# Patient Record
Sex: MALE | Race: WHITE | Employment: FULL TIME | URBAN - METROPOLITAN AREA
[De-identification: names, ages, dates, MRNs, and addresses within clinical notes are randomized per-mention and may not be internally consistent; named-entity substitution may affect disease eponyms.]

---

## 2017-11-18 ENCOUNTER — HOSPITAL ENCOUNTER (OUTPATIENT)
Age: 62
Setting detail: OBSERVATION
Discharge: HOME | End: 2017-11-19
Attending: EMERGENCY MEDICINE | Admitting: HOSPITALIST
Payer: COMMERCIAL

## 2017-11-18 ENCOUNTER — APPOINTMENT (OUTPATIENT)
Dept: CT IMAGING | Age: 62
End: 2017-11-18
Attending: EMERGENCY MEDICINE
Payer: COMMERCIAL

## 2017-11-18 ENCOUNTER — APPOINTMENT (OUTPATIENT)
Dept: ULTRASOUND IMAGING | Age: 62
End: 2017-11-18
Attending: HOSPITALIST
Payer: COMMERCIAL

## 2017-11-18 DIAGNOSIS — I63.9 CEREBROVASCULAR ACCIDENT (CVA), UNSPECIFIED MECHANISM (HCC): Primary | ICD-10-CM

## 2017-11-18 PROBLEM — E03.9 ACQUIRED HYPOTHYROIDISM: Chronic | Status: ACTIVE | Noted: 2017-11-18

## 2017-11-18 PROBLEM — I10 HYPERTENSION: Status: ACTIVE | Noted: 2017-11-18

## 2017-11-18 PROBLEM — F98.8 ADD (ATTENTION DEFICIT DISORDER): Chronic | Status: ACTIVE | Noted: 2017-11-18

## 2017-11-18 LAB
ALBUMIN SERPL-MCNC: 4.1 G/DL (ref 3.2–4.6)
ALBUMIN/GLOB SERPL: 1 {RATIO} (ref 1.2–3.5)
ALP SERPL-CCNC: 85 U/L (ref 50–136)
ALT SERPL-CCNC: 39 U/L (ref 12–65)
ANION GAP SERPL CALC-SCNC: 10 MMOL/L (ref 7–16)
AST SERPL-CCNC: 32 U/L (ref 15–37)
ATRIAL RATE: 96 BPM
BASOPHILS # BLD: 0.1 K/UL (ref 0–0.2)
BASOPHILS NFR BLD: 1 % (ref 0–2)
BILIRUB SERPL-MCNC: 0.3 MG/DL (ref 0.2–1.1)
BUN SERPL-MCNC: 14 MG/DL (ref 8–23)
CALCIUM SERPL-MCNC: 9.3 MG/DL (ref 8.3–10.4)
CALCULATED P AXIS, ECG09: 47 DEGREES
CALCULATED R AXIS, ECG10: -2 DEGREES
CALCULATED T AXIS, ECG11: 34 DEGREES
CHLORIDE SERPL-SCNC: 99 MMOL/L (ref 98–107)
CO2 SERPL-SCNC: 29 MMOL/L (ref 21–32)
CREAT BLD-MCNC: 1.1 MG/DL (ref 0.8–1.5)
CREAT SERPL-MCNC: 1.3 MG/DL (ref 0.8–1.5)
DIAGNOSIS, 93000: NORMAL
DIFFERENTIAL METHOD BLD: ABNORMAL
EOSINOPHIL # BLD: 0.1 K/UL (ref 0–0.8)
EOSINOPHIL NFR BLD: 3 % (ref 0.5–7.8)
ERYTHROCYTE [DISTWIDTH] IN BLOOD BY AUTOMATED COUNT: 13.3 % (ref 11.9–14.6)
GLOBULIN SER CALC-MCNC: 4.2 G/DL (ref 2.3–3.5)
GLUCOSE BLD STRIP.AUTO-MCNC: 183 MG/DL (ref 65–100)
GLUCOSE BLD STRIP.AUTO-MCNC: 196 MG/DL (ref 65–100)
GLUCOSE SERPL-MCNC: 176 MG/DL (ref 65–100)
HCT VFR BLD AUTO: 37 % (ref 41.1–50.3)
HGB BLD-MCNC: 12.3 G/DL (ref 13.6–17.2)
IMM GRANULOCYTES # BLD: 0 K/UL (ref 0–0.5)
IMM GRANULOCYTES NFR BLD AUTO: 0 % (ref 0–5)
INR BLD: 0.9 (ref 0.9–1.2)
LYMPHOCYTES # BLD: 2 K/UL (ref 0.5–4.6)
LYMPHOCYTES NFR BLD: 36 % (ref 13–44)
MCH RBC QN AUTO: 31.4 PG (ref 26.1–32.9)
MCHC RBC AUTO-ENTMCNC: 33.2 G/DL (ref 31.4–35)
MCV RBC AUTO: 94.4 FL (ref 79.6–97.8)
MONOCYTES # BLD: 0.7 K/UL (ref 0.1–1.3)
MONOCYTES NFR BLD: 12 % (ref 4–12)
NEUTS SEG # BLD: 2.7 K/UL (ref 1.7–8.2)
NEUTS SEG NFR BLD: 48 % (ref 43–78)
P-R INTERVAL, ECG05: 176 MS
PLATELET # BLD AUTO: 361 K/UL (ref 150–450)
PMV BLD AUTO: 10.6 FL (ref 10.8–14.1)
POTASSIUM SERPL-SCNC: 4.2 MMOL/L (ref 3.5–5.1)
PROT SERPL-MCNC: 8.3 G/DL (ref 6.3–8.2)
PT BLD: 11.2 SECS (ref 9.6–11.6)
Q-T INTERVAL, ECG07: 348 MS
QRS DURATION, ECG06: 88 MS
QTC CALCULATION (BEZET), ECG08: 439 MS
RBC # BLD AUTO: 3.92 M/UL (ref 4.23–5.67)
SODIUM SERPL-SCNC: 138 MMOL/L (ref 136–145)
TROPONIN I BLD-MCNC: 0 NG/ML (ref 0.02–0.05)
VENTRICULAR RATE, ECG03: 96 BPM
WBC # BLD AUTO: 5.5 K/UL (ref 4.3–11.1)

## 2017-11-18 PROCEDURE — 80053 COMPREHEN METABOLIC PANEL: CPT | Performed by: EMERGENCY MEDICINE

## 2017-11-18 PROCEDURE — 74011250637 HC RX REV CODE- 250/637: Performed by: HOSPITALIST

## 2017-11-18 PROCEDURE — 96372 THER/PROPH/DIAG INJ SC/IM: CPT

## 2017-11-18 PROCEDURE — 99285 EMERGENCY DEPT VISIT HI MDM: CPT | Performed by: EMERGENCY MEDICINE

## 2017-11-18 PROCEDURE — 93880 EXTRACRANIAL BILAT STUDY: CPT

## 2017-11-18 PROCEDURE — 82565 ASSAY OF CREATININE: CPT

## 2017-11-18 PROCEDURE — 82962 GLUCOSE BLOOD TEST: CPT

## 2017-11-18 PROCEDURE — 85610 PROTHROMBIN TIME: CPT

## 2017-11-18 PROCEDURE — 93005 ELECTROCARDIOGRAM TRACING: CPT | Performed by: EMERGENCY MEDICINE

## 2017-11-18 PROCEDURE — 70450 CT HEAD/BRAIN W/O DYE: CPT

## 2017-11-18 PROCEDURE — 74011250636 HC RX REV CODE- 250/636: Performed by: HOSPITALIST

## 2017-11-18 PROCEDURE — 74011000250 HC RX REV CODE- 250: Performed by: EMERGENCY MEDICINE

## 2017-11-18 PROCEDURE — 65270000029 HC RM PRIVATE

## 2017-11-18 PROCEDURE — 70496 CT ANGIOGRAPHY HEAD: CPT

## 2017-11-18 PROCEDURE — 96374 THER/PROPH/DIAG INJ IV PUSH: CPT | Performed by: EMERGENCY MEDICINE

## 2017-11-18 PROCEDURE — 74011000258 HC RX REV CODE- 258: Performed by: EMERGENCY MEDICINE

## 2017-11-18 PROCEDURE — 74011636320 HC RX REV CODE- 636/320: Performed by: EMERGENCY MEDICINE

## 2017-11-18 PROCEDURE — 84484 ASSAY OF TROPONIN QUANT: CPT

## 2017-11-18 PROCEDURE — 85025 COMPLETE CBC W/AUTO DIFF WBC: CPT | Performed by: EMERGENCY MEDICINE

## 2017-11-18 PROCEDURE — 74011250637 HC RX REV CODE- 250/637: Performed by: EMERGENCY MEDICINE

## 2017-11-18 PROCEDURE — 99218 HC RM OBSERVATION: CPT

## 2017-11-18 RX ORDER — ASPIRIN 325 MG
325 TABLET ORAL DAILY
Status: DISCONTINUED | OUTPATIENT
Start: 2017-11-19 | End: 2017-11-19 | Stop reason: HOSPADM

## 2017-11-18 RX ORDER — LANOLIN ALCOHOL/MO/W.PET/CERES
100 CREAM (GRAM) TOPICAL DAILY
Status: DISCONTINUED | OUTPATIENT
Start: 2017-11-19 | End: 2017-11-19 | Stop reason: HOSPADM

## 2017-11-18 RX ORDER — LABETALOL HYDROCHLORIDE 5 MG/ML
5 INJECTION, SOLUTION INTRAVENOUS
Status: DISCONTINUED | OUTPATIENT
Start: 2017-11-18 | End: 2017-11-19 | Stop reason: HOSPADM

## 2017-11-18 RX ORDER — SODIUM CHLORIDE 0.9 % (FLUSH) 0.9 %
5-10 SYRINGE (ML) INJECTION AS NEEDED
Status: DISCONTINUED | OUTPATIENT
Start: 2017-11-18 | End: 2017-11-18

## 2017-11-18 RX ORDER — LORAZEPAM 2 MG/ML
2 INJECTION INTRAMUSCULAR
Status: DISCONTINUED | OUTPATIENT
Start: 2017-11-18 | End: 2017-11-19 | Stop reason: HOSPADM

## 2017-11-18 RX ORDER — LEVOTHYROXINE SODIUM 50 UG/1
75 TABLET ORAL
Status: DISCONTINUED | OUTPATIENT
Start: 2017-11-19 | End: 2017-11-19 | Stop reason: HOSPADM

## 2017-11-18 RX ORDER — DEXTROAMPHETAMINE SACCHARATE, AMPHETAMINE ASPARTATE, DEXTROAMPHETAMINE SULFATE AND AMPHETAMINE SULFATE 2.5; 2.5; 2.5; 2.5 MG/1; MG/1; MG/1; MG/1
20 TABLET ORAL DAILY
Status: DISCONTINUED | OUTPATIENT
Start: 2017-11-19 | End: 2017-11-19 | Stop reason: HOSPADM

## 2017-11-18 RX ORDER — LEVOTHYROXINE SODIUM 75 UG/1
75 TABLET ORAL
COMMUNITY

## 2017-11-18 RX ORDER — ESCITALOPRAM OXALATE 10 MG/1
20 TABLET ORAL DAILY
COMMUNITY

## 2017-11-18 RX ORDER — SODIUM CHLORIDE 0.9 % (FLUSH) 0.9 %
5-10 SYRINGE (ML) INJECTION EVERY 8 HOURS
Status: DISCONTINUED | OUTPATIENT
Start: 2017-11-18 | End: 2017-11-18

## 2017-11-18 RX ORDER — LORAZEPAM 1 MG/1
1 TABLET ORAL
Status: DISCONTINUED | OUTPATIENT
Start: 2017-11-18 | End: 2017-11-19 | Stop reason: HOSPADM

## 2017-11-18 RX ORDER — PRAVASTATIN SODIUM 20 MG/1
80 TABLET ORAL
Status: DISCONTINUED | OUTPATIENT
Start: 2017-11-18 | End: 2017-11-19 | Stop reason: HOSPADM

## 2017-11-18 RX ORDER — GUAIFENESIN 100 MG/5ML
324 LIQUID (ML) ORAL
Status: COMPLETED | OUTPATIENT
Start: 2017-11-18 | End: 2017-11-18

## 2017-11-18 RX ORDER — ESCITALOPRAM OXALATE 10 MG/1
10 TABLET ORAL DAILY
Status: DISCONTINUED | OUTPATIENT
Start: 2017-11-19 | End: 2017-11-19 | Stop reason: HOSPADM

## 2017-11-18 RX ORDER — SODIUM CHLORIDE 0.9 % (FLUSH) 0.9 %
10 SYRINGE (ML) INJECTION
Status: DISCONTINUED | OUTPATIENT
Start: 2017-11-18 | End: 2017-11-18

## 2017-11-18 RX ORDER — DEXTROAMPHETAMINE SACCHARATE, AMPHETAMINE ASPARTATE, DEXTROAMPHETAMINE SULFATE AND AMPHETAMINE SULFATE 5; 5; 5; 5 MG/1; MG/1; MG/1; MG/1
20 TABLET ORAL DAILY
COMMUNITY
End: 2018-09-13

## 2017-11-18 RX ORDER — LABETALOL HYDROCHLORIDE 5 MG/ML
10 INJECTION, SOLUTION INTRAVENOUS
Status: COMPLETED | OUTPATIENT
Start: 2017-11-18 | End: 2017-11-18

## 2017-11-18 RX ORDER — FOLIC ACID 1 MG/1
1 TABLET ORAL DAILY
Status: DISCONTINUED | OUTPATIENT
Start: 2017-11-19 | End: 2017-11-19 | Stop reason: HOSPADM

## 2017-11-18 RX ORDER — ENOXAPARIN SODIUM 100 MG/ML
40 INJECTION SUBCUTANEOUS EVERY 24 HOURS
Status: DISCONTINUED | OUTPATIENT
Start: 2017-11-18 | End: 2017-11-19 | Stop reason: HOSPADM

## 2017-11-18 RX ORDER — SODIUM CHLORIDE 0.9 % (FLUSH) 0.9 %
10 SYRINGE (ML) INJECTION
Status: COMPLETED | OUTPATIENT
Start: 2017-11-18 | End: 2017-11-18

## 2017-11-18 RX ORDER — SODIUM CHLORIDE 0.9 % (FLUSH) 0.9 %
5-10 SYRINGE (ML) INJECTION EVERY 8 HOURS
Status: DISCONTINUED | OUTPATIENT
Start: 2017-11-18 | End: 2017-11-19 | Stop reason: HOSPADM

## 2017-11-18 RX ADMIN — Medication 10 ML: at 17:00

## 2017-11-18 RX ADMIN — Medication 10 ML: at 21:05

## 2017-11-18 RX ADMIN — Medication 10 ML: at 10:57

## 2017-11-18 RX ADMIN — Medication 10 ML: at 22:00

## 2017-11-18 RX ADMIN — LABETALOL HYDROCHLORIDE 10 MG: 5 INJECTION, SOLUTION INTRAVENOUS at 10:50

## 2017-11-18 RX ADMIN — ENOXAPARIN SODIUM 40 MG: 40 INJECTION SUBCUTANEOUS at 18:16

## 2017-11-18 RX ADMIN — LORAZEPAM 1 MG: 1 TABLET ORAL at 20:57

## 2017-11-18 RX ADMIN — PRAVASTATIN SODIUM 80 MG: 20 TABLET ORAL at 21:04

## 2017-11-18 RX ADMIN — Medication 10 ML: at 18:36

## 2017-11-18 RX ADMIN — ASPIRIN 81 MG 324 MG: 81 TABLET ORAL at 11:39

## 2017-11-18 RX ADMIN — Medication 10 ML: at 21:04

## 2017-11-18 RX ADMIN — IOPAMIDOL 80 ML: 755 INJECTION, SOLUTION INTRAVENOUS at 10:57

## 2017-11-18 RX ADMIN — SODIUM CHLORIDE 100 ML: 900 INJECTION, SOLUTION INTRAVENOUS at 10:58

## 2017-11-18 NOTE — ED PROVIDER NOTES
HPI Comments: 49-year-old male presenting with symptoms concerning for stroke. Mentions that symptoms began about 10 minutes or to arrival which would make his time of onset approximately 10:20 AM.  He mentions he was at breakfast when he experienced sudden onset weakness and numbness of his left upper and lower extremity. He also was noted to have a left-sided facial droop. No history of previous CVA. Denies any visual complaints. Denies headache. Patient does not appear to be on any blood thinning medications. History includes hypertension. Patient is a 57 y.o. male presenting with weakness. The history is provided by the patient. No  was used. Extremity Weakness    Pertinent negatives include no back pain. Past Medical History:   Diagnosis Date    Chronic kidney disease     kidney stone    Hypertension        History reviewed. No pertinent surgical history. History reviewed. No pertinent family history. Social History     Social History    Marital status:      Spouse name: N/A    Number of children: N/A    Years of education: N/A     Occupational History    Not on file. Social History Main Topics    Smoking status: Never Smoker    Smokeless tobacco: Not on file    Alcohol use Yes      Comment: occasional    Drug use: No    Sexual activity: Not on file     Other Topics Concern    Not on file     Social History Narrative         ALLERGIES: Review of patient's allergies indicates no known allergies. Review of Systems   Constitutional: Negative for fatigue and fever. HENT: Negative for congestion. Respiratory: Negative for cough and shortness of breath. Gastrointestinal: Negative for abdominal pain, nausea and vomiting. Genitourinary: Negative for flank pain. Musculoskeletal: Negative for back pain. Skin: Negative for rash. Neurological: Positive for weakness. Negative for syncope, light-headedness and headaches.    All other systems reviewed and are negative. Vitals:    11/18/17 1035   BP: (!) 184/110   Pulse: (!) 106   Resp: 18   SpO2: 100%   Weight: 108 kg (238 lb)   Height: 5' 9\" (1.753 m)            Physical Exam   Constitutional: He is oriented to person, place, and time. He appears well-developed and well-nourished. No distress. HENT:   Head: Normocephalic and atraumatic. Eyes: Conjunctivae and EOM are normal. Pupils are equal, round, and reactive to light. Neck: Normal range of motion. Neck supple. Cardiovascular: Normal rate, regular rhythm and normal heart sounds. Symmetric pulses in bilateral upper and lower extremities. Pulmonary/Chest: Effort normal and breath sounds normal. No respiratory distress. He has no wheezes. He has no rales. Abdominal: Soft. He exhibits no distension. There is no tenderness. There is no rebound. Musculoskeletal: Normal range of motion. He exhibits no edema or tenderness. Neurological: He is alert and oriented to person, place, and time. Patient has a left-sided facial droop. He is able to furrow his brow bilaterally. Patient has weakness of his left upper and left lower extremity. He reports numbness. While pinching his left lower extremity he had no sensation. He is speaking in a clear voice without slurring and denies headache. Skin: Skin is warm and dry. No rash noted. He is not diaphoretic. Psychiatric: He has a normal mood and affect. His behavior is normal.   Vitals reviewed. MDM  Number of Diagnoses or Management Options  Cerebrovascular accident (CVA), unspecified mechanism [facility]): new and requires workup  Diagnosis management comments: 10:43 AM  Patient's signs and symptoms concerning for CVA. We will obtain basic labs and immediately sent to CT scan. Code stroke called. Patient somewhat hypertensive at 184 over 110. Ordered a small amount of labetalol. Pending urgent neuro consult.        Amount and/or Complexity of Data Reviewed  Clinical lab tests: ordered and reviewed (Results for orders placed or performed during the hospital encounter of 11/18/17  -CBC WITH AUTOMATED DIFF       Result                                            Value                         Ref Range                       WBC                                               5.5                           4.3 - 11.1 K/uL                 RBC                                               3.92 (L)                      4.23 - 5.67 M/uL                HGB                                               12.3 (L)                      13.6 - 17.2 g/dL                HCT                                               37.0 (L)                      41.1 - 50.3 %                   MCV                                               94.4                          79.6 - 97.8 FL                  MCH                                               31.4                          26.1 - 32.9 PG                  MCHC                                              33.2                          31.4 - 35.0 g/dL                RDW                                               13.3                          11.9 - 14.6 %                   PLATELET                                          361                           150 - 450 K/uL                  MPV                                               10.6 (L)                      10.8 - 14.1 FL                  DF                                                AUTOMATED                                                     NEUTROPHILS                                       48                            43 - 78 %                       LYMPHOCYTES                                       36                            13 - 44 %                       MONOCYTES                                         12                            4.0 - 12.0 %                    EOSINOPHILS                                       3                             0.5 - 7.8 %                     BASOPHILS 1                             0.0 - 2.0 %                     IMMATURE GRANULOCYTES                             0                             0.0 - 5.0 %                     ABS. NEUTROPHILS                                  2.7                           1.7 - 8.2 K/UL                  ABS. LYMPHOCYTES                                  2.0                           0.5 - 4.6 K/UL                  ABS. MONOCYTES                                    0.7                           0.1 - 1.3 K/UL                  ABS. EOSINOPHILS                                  0.1                           0.0 - 0.8 K/UL                  ABS. BASOPHILS                                    0.1                           0.0 - 0.2 K/UL                  ABS. IMM.  GRANS.                                  0.0                           0.0 - 0.5 K/UL             -METABOLIC PANEL, COMPREHENSIVE       Result                                            Value                         Ref Range                       Sodium                                            138                           136 - 145 mmol/L                Potassium                                         4.2                           3.5 - 5.1 mmol/L                Chloride                                          99                            98 - 107 mmol/L                 CO2                                               29                            21 - 32 mmol/L                  Anion gap                                         10                            7 - 16 mmol/L                   Glucose                                           176 (H)                       65 - 100 mg/dL                  BUN                                               14                            8 - 23 MG/DL                    Creatinine                                        1.30                          0.8 - 1.5 MG/DL                 GFR est AA >60                           >60 ml/min/1.73m2               GFR est non-AA                                    59 (L)                        >60 ml/min/1.73m2               Calcium                                           9.3                           8.3 - 10.4 MG/DL                Bilirubin, total                                  0.3                           0.2 - 1.1 MG/DL                 ALT (SGPT)                                        39                            12 - 65 U/L                     AST (SGOT)                                        32                            15 - 37 U/L                     Alk. phosphatase                                  85                            50 - 136 U/L                    Protein, total                                    8.3 (H)                       6.3 - 8.2 g/dL                  Albumin                                           4.1                           3.2 - 4.6 g/dL                  Globulin                                          4.2 (H)                       2.3 - 3.5 g/dL                  A-G Ratio                                         1.0 (L)                       1.2 - 3.5                  -POC PT/INR       Result                                            Value                         Ref Range                       Prothrombin time (POC)                            11.2                          9.6 - 11.6 SECS                 INR (POC)                                         0.9                           0.9 - 1.2                  -GLUCOSE, POC       Result                                            Value                         Ref Range                       Glucose (POC)                                     196 (H)                       65 - 100 mg/dL             -POC TROPONIN-I       Result                                            Value                         Ref Range                       Troponin-I (POC) 0 (L)                         0.02 - 0.05 ng/ml          -MRI/CT POC CREATININE       Result                                            Value                         Ref Range                       Creatinine (POC)                                  1.1                           0.8 - 1.5 MG/DL                 GFRAA, POC                                        >60                           >60 ml/min/1.73m2               GFRNA, POC                                        >60                           >60 ml/min/1.73m2          -EKG, 12 LEAD, INITIAL       Result                                            Value                         Ref Range                       Ventricular Rate                                  96                            BPM                             Atrial Rate                                       96                            BPM                             P-R Interval                                      176                           ms                              QRS Duration                                      88                            ms                              Q-T Interval                                      348                           ms                              QTC Calculation (Bezet)                           439                           ms                              Calculated P Axis                                 47                            degrees                         Calculated R Axis                                 -2                            degrees                         Calculated T Axis                                 34                            degrees                         Diagnosis                                                                                                   Normal sinus rhythm   Normal ECG   When compared with ECG of 19-MAR-2015 13:21,   No significant change was found     )  Tests in the radiology section of CPT®: ordered and reviewed (Ct Head Wo Cont    Result Date: 11/18/2017  NONCONTRAST HEAD CT CLINICAL HISTORY:  Code stroke for left-sided facial droop, slurred speech, left arm numbness. COMPARISON:  None. REPORT:   Standard non contrast head CT demonstrates no definite intracranial mass effect none. , hemorrhage, or evidence of acute geographic infarction. The ventricles are normal in size and configuration, accounting for the patient's age. Orbits and  paranasal sinuses are clear where imaged. Bone windows demonstrate no definite fracture or destruction. IMPRESSION:     NO ACUTE INTRACRANIAL ABNORMALITY IDENTIFIED AT NONCONTRAST CT. Cta Head Neck W Wo Cont    Result Date: 11/18/2017  Title:  CT arteriogram of the neck and head. Indication: Cerebrovascular accident, left-sided weakness. Technique: Axial images of the neck and head were obtained after the uneventful administration of intravenous iodinated contrast media. Contrast was used to best identify the arterial structures. Images were reviewed on a separate, free standing, three-dimensional workstation as per the referring physicians request.  All stenosis percentages reference the distal internal carotid artery, as per NASCET criteria. All CT scans at this facility are performed using dose optimization techniques/dose modulation as appropriate, including the following:  automated exposure control; adjustment of the mA and/or kV according to patient size (this includes techniques or standardized protocols for targeted exams where dose is matched to indication/reason for exam); use of iterative reconstruction technique. Comparison: Head CT from earlier in the day. Findings: The visualized lung apices are clear except for mild posterior, dependent, atelectasis. Unremarkable thyroid gland. Mild mucosal thickening in the left maxillary sinus. No obvious intracranial mass or midline shift. No destructive bone lesion.  No visualized pulmonary artery filling defect. Patent left subclavian and brachiocephalic veins. Patent superior sagittal sinus and bilateral transverse sinuses. Mild atherosclerotic calcification in the proximal descending thoracic aorta. Bovine arch anatomy. Mild intimal thickening in an otherwise widely patent right brachiocephalic artery and right subclavian artery. Similar findings in the left subclavian artery. The right common carotid artery is moderately tortuous and widely patent with diffuse intimal thickening. Right external carotid artery is tortuous with a slight kink, but no significant stenosis. The right internal carotid artery is widely patent in the carotid bulb with 2 sharp angulations just below the skull base. No cervical right internal carotid artery stenosis appreciated. The left common carotid artery is tortuous with a sharp angulation at the level of the clavicle, but otherwise widely patent in spite of diffuse intimal thickening. The left external carotid artery is patent. The left internal carotid artery is moderately tortuous. There is intimal thickening with 2 punctate calcifications in the carotid bulb. No significant cervical left internal carotid artery stenosis. Right vertebral artery demonstrates diffuse atherosclerotic mural irregularity yet remains patent throughout its cervical and intracranial course. There is a mild, smoothly tapered, narrowing in the intracranial right vertebral artery. Left vertebral artery also demonstrates diffuse mural irregularity. No left vertebral artery stenosis appreciated. Intracranial images demonstrate atherosclerotic plaque in both internal carotid arteries resulting in minimal luminal narrowing. The right and left middle cerebral arteries are patent. The anterior cerebral arteries are patent. The anterior communicating artery is not definitively visualized. The posterior communicating arteries are not visualized.  Basilar artery demonstrates atherosclerotic mural irregularity and remains patent. Similar atherosclerotic disease in the patent right and left posterior cerebral arteries. Impression: Diffuse atherosclerotic wall thickening with scattered calcified plaques. Tortuous common carotid, internal carotid, and vertebral arteries consistent with long-standing hypertension. No significant internal carotid artery stenosis. No intracranial arterial thromboembolism, high-grade stenosis, aneurysm, or vascular malformation.     )  Review and summarize past medical records: yes  Discuss the patient with other providers: yes  Independent visualization of images, tracings, or specimens: yes    Risk of Complications, Morbidity, and/or Mortality  Presenting problems: high  Diagnostic procedures: high  Management options: high    Patient Progress  Patient progress: improved    ED Course   Comment By Time   I called radiologist reading CT scan he says he sees no acute abnormalities on CT. The result has not yet crossed over due to IT difficulties. Oni Vásquez MD 11/18 2662   I called CT tech who says she is sending the images to interventional radiology to read. I informed her this was timed dependent and then expedited read was recommended. Oni Vásquez MD 11/18 [id number]   I called CT tech who [facility] result has still not been read yet. Pending CTA results. Stressed time sensitive result.  Oni Vásquez MD 11/18 [id number]       Procedures

## 2017-11-18 NOTE — PROGRESS NOTES
TRANSFER - IN REPORT:    Verbal report received from Ivon Robbins RN on Jana Whitley  being received from ED for routine progression of care      Report consisted of patients Situation, Background, Assessment and   Recommendations(SBAR). Information from the following report(s) SBAR, Kardex, Procedure Summary, Intake/Output, MAR, Recent Results and Med Rec Status was reviewed with the receiving nurse. Opportunity for questions and clarification was provided. Assessment completed upon patients arrival to unit and care assumed.

## 2017-11-18 NOTE — IP AVS SNAPSHOT
Summary of Care Report The Summary of Care report has been created to help improve care coordination. Users with access to [employer] or [address] (Web-based application) may access additional patient information including the Discharge Summary. If you are not currently a [employer] user and need more information, please call the number listed below in the [place] [address] section and ask to be connected with Medical Records. Facility Information Name Address Phone [address] [place] [address] [address] [place] [id number] [phone] Patient Information Patient Name Sex INA Waters ([id number]) Male 1955 Discharge Information Admitting Provider Service Area Unit Meg Davila MD / [place] [id number] Intensive Care / [phone] Discharge Provider Discharge Date/Time Discharge Disposition Destination (none) 2017 (Pending) AHR (none) Patient Language Language ENGLISH [13] Hospital Problems as of 2017  Never Reviewed Class Noted - Resolved Last Modified POA Active Problems * (Principal)TIA (transient ischemic attack)  2017 - Present 2017 by Meg Davila MD Yes Entered by Meg Davila MD  
  Hypertension  2017 - Present 2017 by Meg Davila MD Yes Entered by Meg Davila MD  
  Acquired hypothyroidism (Chronic)  2017 - Present 2017 by Meg Davila MD Yes Entered by Meg Davila MD  
  ADD (attention deficit disorder) (Chronic)  2017 - Present 2017 by Meg Davila MD Yes Entered by Meg Davila MD  
  Hyperlipidemia  2017 - Present 2017 by Meg Davila MD Yes Entered by Meg Davila MD  
  
You are allergic to the following No active allergies Current Discharge Medication List  
  
START taking these medications Dose & Instructions Dispensing Information Comments  
 aspirin delayed-release 81 mg tablet Dose:  81 mg Take 1 Tab by mouth daily for 30 days. Indications: Cerebral Thromboembolism Prevention Quantity:  30 Tab Refills:  0  
   
 atorvastatin 40 mg tablet Commonly known as:  LIPITOR Dose:  40 mg Take 1 Tab by mouth daily for 30 days. Indications: hyperlipidemia Quantity:  30 Tab Refills:  0 CONTINUE these medications which have NOT CHANGED Dose & Instructions Dispensing Information Comments  
 dextroamphetamine-amphetamine 20 mg tablet Commonly known as:  ADDERALL Dose:  20 mg Take 20 mg by mouth daily. Refills:  0  
   
 levothyroxine 75 mcg tablet Commonly known as:  SYNTHROID Dose:  75 mcg Take 75 mcg by mouth Daily (before breakfast). Refills:  0 LEXAPRO 10 mg tablet Generic drug:  escitalopram oxalate Dose:  10 mg Take 10 mg by mouth daily. Refills:  0  
   
 valsartan 320 mg tab 320 mg, hydroCHLOROthiazide 25 mg tab 25 mg  
 Dose:  1 Dose Take 1 Dose by mouth daily. Refills:  0 Current Immunizations Name Date Influenza Vaccine (Quad) PF 11/19/2017 Follow-up Information Follow up With Details Comments Contact Info Chelsi Neff MD   [address] [address] [address] [id number] 
[phone] Discharge Instructions Stroke: After Your Visit Your Care Instructions You have had a stroke. Risk factors for stroke include being overweight, smoking, and sedentary lifestyle. This means that the blood flow to a part of your brain was blocked for some time, which damages the nerve cells in that part of the brain. The part of your body controlled by that part of your brain may not function properly now. The brain is an amazing organ that can heal itself to some degree. The stroke you had damaged part of your brain, but other parts of your brain may take over in some way for the damaged areas. You have already started this process. Going home may be hard for you and your family. The more you can try to do for yourself, the better. Remember to take each day one at a time. Follow-up care is a key part of your treatment and safety. Be sure to make and go to all appointments, and call your doctor if you are having problems. Its also a good idea to know your test results and keep a list of the medicines you take. How can you care for yourself at home? Enter a stroke rehabilitation (rehab) program, if your doctor recommends it. Physical, speech, and occupational therapies can help you manage bathing, dressing, eating, and other basics of daily living. Eat a heart-healthy diet that is low in cholesterol, saturated fat, and salt. Eat lots of fresh fruits and vegetables and foods high in fiber. Increase your activities slowly. Take short rest breaks when you get tired. Gradually increase the amount you walk. Start out by walking a little more than you did the day before. Do not drive until your doctor says it is okay. It is normal to feel sad or depressed after a stroke. If the blues last, talk to your doctor. If you are having problems with urine leakage, go to the bathroom at regular times, including when you first wake up and at bedtime. Also, limit fluids after dinner. If you are constipated, drink plenty of fluids, enough so that your urine is light yellow or clear like water. If you have kidney, heart, or liver disease and have to limit fluids, talk with your doctor before you increase the amount of fluids you drink. Set up a regular time for using the toilet.  If you continue to have constipation, your doctor may suggest using a bulking agent, such as Metamucil, or a stool softener, laxative, or enema. Medicines Take your medicines exactly as prescribed. Call your doctor if you think you are having a problem with your medicine. You may be taking several medicines. ACE (angiotensin-converting enzyme) inhibitors, angiotensin II receptor blockers (ARBs), beta-blockers, diuretics (water pills), and calcium channel blockers control your blood pressure. Statins help lower cholesterol. Your doctor may also prescribe medicines for depression, pain, sleep problems, anxiety, or agitation. If your doctor has given you medicine that prevents blood clots, such as warfarin (Coumadin), aspirin combined with extended-release dipyridamole (Aggrenox), clopidogrel (Plavix), or aspirin to prevent another stroke, you should: 
Tell your dentist, pharmacist, and other health professionals that you take these medicines. Watch for unusual bruising or bleeding, such as blood in your urine, red or black stools, or bleeding from your nose or gums. Get regular blood tests to check your clotting time if you are taking Coumadin. Wear medical alert jewelry that says you take blood thinners. You can buy this at most [employer]. Do not take any over-the-counter medicines or herbal products without talking to your doctor first. 
If you take birth control pills or hormone replacement therapy, talk to your doctor about whether they are right for you. For family members and caregivers Make the home safe. Set up a room so that your loved one does not have to climb stairs. Be sure the bathroom is on the same floor. Move throw rugs and furniture that could cause falls, and make sure that the lighting is good. Put grab bars and seats in tubs and showers. Find out what your loved one can do and what he or she needs help with. Try not to do things for your loved one that your loved one can do on his or her own. Help him or her learn and practice new skills. Visit and talk with your loved one often.  Try doing activities together that you both enjoy, such as playing cards or board games. Keep in touch with your loved one's friends as much as you can, and encourage them to visit. Take care of yourself. Do not try to do everything yourself. Ask other family members to help. Eat well, get enough rest, and take time to do things that you enjoy. Keep up with your own doctor visits, and make sure to take your medicines regularly. Get out of the house as much as you can. Join a local support group. Find out if you qualify for home health care visits to help with rehab or for adult day care. When should you call for help? Call 911 anytime you think you may need emergency care. For example, call if: 
You have signs of another stroke. These may include: 
Sudden numbness, paralysis, or weakness in your face, arm, or leg, especially on only one side of your body. New problems with walking or balance. Sudden vision changes. Drooling or slurred speech. New problems speaking or understanding simple statements, or you feel confused. A sudden, severe headache that is different from past headaches. Call 911 even if these symptoms go away in a few minutes. You cough up blood. You vomit blood or what looks like coffee grounds. You pass maroon or very bloody stools. Call your doctor now or seek immediate medical care if: 
You have new bruises or blood spots under your skin. You have a nosebleed. Your gums bleed when you brush your teeth. You have blood in your urine. Your stools are black and tarlike or have streaks of blood. You have vaginal bleeding when you are not having your period, or heavy period bleeding. You have new symptoms that may be related to your stroke, such as falls or trouble swallowing. Watch closely for changes in your health, and be sure to contact your doctor if you have any problems. Where can you learn more? Go to DealExplorer.be Enter N098  in the search box to learn more about \"Stroke: After Your Visit\". © 6038-5728 [employer]wise, [employer]. Care instructions adapted under license by Liliane Lucero (which disclaims liability or warranty for this information). This care instruction is for use with your licensed healthcare professional. If you have questions about a medical condition or this instruction, always ask your healthcare professional. [facility] any warranty or liability for your use of this information. Learning About an Ischemic Stroke What is an ischemic stroke? An ischemic (say \"ttw-VKD-bcgv\") stroke occurs when a blood clot blocks a blood vessel in the brain. This means that blood cannot flow to some part of the brain. Without blood and the oxygen it carries, this part of the brain starts to die. The part of the body controlled by the damaged area of the brain cannot work properly. This is different from a hemorrhagic (say \"yvo-jmz-YZ-jick\") stroke, which happens when a blood vessel in the brain has burst open or has started to leak. The brain damage from a stroke starts within minutes. Quick treatment can help limit damage to the brain and make recovery more likely. People who have had a stroke may have a hard time talking, understanding things, and making decisions. They may have to relearn daily activities, such as how to eat, bathe, and dress. How well someone recovers from a stroke depends on how quickly the person gets to the hospital, where in the brain the stroke happened, and how severe it was. Training and therapy also make a difference in how well people recover. What are the symptoms? If you have any of these symptoms, call 911 or other emergency services right away: 
· You have symptoms of a stroke. These may include: 
¨ Sudden numbness, tingling, weakness, or loss of movement in your face, arm, or leg, especially on only one side of your body. ¨ Sudden vision changes. ¨ Sudden trouble speaking. ¨ Sudden confusion or trouble understanding simple statements. ¨ Sudden problems with walking or balance. ¨ A sudden, severe headache that is different from past headaches. See your doctor if you have symptoms that seem like a stroke, even if they go away quickly. You may have had a transient ischemic attack (TIA), sometimes called a mini-stroke. A TIA is a warning that a stroke may happen soon. Getting early treatment for a TIA can help prevent a stroke. What causes an ischemic stroke? An ischemic stroke is caused by a blood clot that blocks blood flow in the brain. The most common causes of blood clots include: 
· Hardening of the arteries (atherosclerosis). This is caused by high blood pressure, diabetes, high cholesterol, or smoking. · Atrial fibrillation. How is ischemic stroke treated? You may have to take several medicines, depending on what caused your stroke. Ask your doctor if a stroke rehab program is right for you. Rehab increases your chances of getting back some of the abilities you lost. 
How can you prevent another stroke? · Work with your doctor to treat any health problems you have. High blood pressure, high cholesterol, atrial fibrillation, and diabetes all raise your chances of having a stroke. · Be safe with medicines. Take your medicine exactly as prescribed. Call your doctor if you think you are having a problem with your medicine. · Have a healthy lifestyle. ¨ Do not smoke or allow others to smoke around you. If you need help quitting, talk to your doctor about stop-smoking programs and medicines. These can increase your chances of quitting for good. Smoking makes a stroke more likely. ¨ Limit alcohol to 2 drinks a day for men and 1 drink a day for women. ¨ Lose weight if you need to. A healthy weight will help you keep your heart and body healthy. ¨ Be active. Ask your doctor what type and level of activity is safe for you. ¨ Eat heart-healthy foods, like fruits, vegetables, and high-fiber foods. Follow-up care is a key part of your treatment and safety. Be sure to make and go to all appointments, and call your doctor if you are having problems. It's also a good idea to know your test results and keep a list of the medicines you take. Where can you learn more? Go to http://alivia-karen.info/. Enter D161 in the search box to learn more about \"Learning About an Ischemic Stroke. \" Current as of: March 20, 2017 Content Version: 11.4 © 3705-3701 [employer]. Care instructions adapted under license by [employer] (which disclaims liability or warranty for this information). If you have questions about a medical condition or this instruction, always ask your healthcare professional. [address] any warranty or liability for your use of this information. Chart Review Routing History No Routing History on File

## 2017-11-18 NOTE — ED NOTES
TRANSFER - OUT REPORT:    Verbal report given to Nirmal Hdz on Naseem Duarte  being transferred to [phone] for routine progression of care       Report consisted of patients Situation, Background, Assessment and   Recommendations(SBAR). Information from the following report(s) SBAR, ED Summary and MAR was reviewed with the receiving nurse. Lines:   Peripheral IV 11/18/17 Left Antecubital (Active)   Site Assessment Clean, dry, & intact 11/18/2017 10:35 AM   Phlebitis Assessment 0 11/18/2017 10:35 AM   Infiltration Assessment 0 11/18/2017 10:35 AM   Dressing Status Clean, dry, & intact 11/18/2017 10:35 AM   Action Taken Blood drawn 11/18/2017 10:35 AM       Peripheral IV 11/18/17 Right Antecubital (Active)   Site Assessment Clean, dry, & intact 11/18/2017 10:52 AM   Phlebitis Assessment 0 11/18/2017 10:52 AM   Infiltration Assessment 0 11/18/2017 10:52 AM   Dressing Status Clean, dry, & intact 11/18/2017 10:52 AM        Opportunity for questions and clarification was provided.       Patient transported with:   Monitor  Registered Nurse

## 2017-11-18 NOTE — IP AVS SNAPSHOT
[address] 
[phone] Patient: Horacio Camacho MRN: [id number] QRF:[id number] My Medications TAKE these medications as instructed Instructions Each Dose to Equal  
 Morning Noon Evening Bedtime  
 aspirin delayed-release 81 mg tablet Your next dose is: In am  
   
 Take 1 Tab by mouth daily for 30 days. Indications: Cerebral Thromboembolism Prevention 81 mg  
    
   
   
   
  
 atorvastatin 40 mg tablet Commonly known as:  LIPITOR Your next dose is: This pm  
   
 Take 1 Tab by mouth daily for 30 days. Indications: hyperlipidemia 40 mg  
    
   
   
   
  
 dextroamphetamine-amphetamine 20 mg tablet Commonly known as:  ADDERALL Your next dose is: In am.  
   
 Take 20 mg by mouth daily. 20 mg  
    
   
   
   
  
 levothyroxine 75 mcg tablet Commonly known as:  SYNTHROID Your next dose is: In am  
   
 Take 75 mcg by mouth Daily (before breakfast). 75 mcg LEXAPRO 10 mg tablet Generic drug:  escitalopram oxalate Your next dose is: In am.  
   
 Take 10 mg by mouth daily. 10 mg  
    
   
   
   
  
 valsartan 320 mg tab 320 mg, hydroCHLOROthiazide 25 mg tab 25 mg Your next dose is: In am.  
   
 Take 1 Dose by mouth daily. 1 Dose Where to Get Your Medications Information on where to get these meds will be given to you by the nurse or doctor. ! Ask your nurse or doctor about these medications  
  aspirin delayed-release 81 mg tablet  
 atorvastatin 40 mg tablet

## 2017-11-18 NOTE — H&P
HOSPITALIST H&P/CONSULT           NAME:  Gricel Hagen   Age:  57 y.o.  :   1955   MRN:   [id number]  PCP: Zachariah Casper MD  Consulting MD:  Treatment Team: Attending Provider: Krystle Baumann MD  HPI:   CC: LUE and LLE weakness, left face numbness    62F with HTN and hypothyroidism presented with acute onset LUE and LLE weakness and numbness. He also noted numbness that started in the left face then progressed. Patient reported that he was sitting at kitchen table with symptoms started. The LUE became weakness and he was unable to make a firm fist or open hands. NO LOC, lightheadedness, dizziness, SOB, chest pain or palpitation. CT and CTA head were unremarkable. Teleneuro consulted in ER    Complete ROS done and is as stated in HPI or otherwise negative    Past Medical History:   Diagnosis Date    Chronic kidney disease     kidney stone    Hypertension       History reviewed. No pertinent surgical history. Prior to Admission Medications   Prescriptions Last Dose Informant Patient Reported? Taking?   dextroamphetamine-amphetamine (ADDERALL) 20 mg tablet   Yes Yes   Sig: Take 20 mg by mouth daily. escitalopram oxalate (LEXAPRO) 10 mg tablet   Yes Yes   Sig: Take 10 mg by mouth daily. levothyroxine (SYNTHROID) 75 mcg tablet   Yes Yes   Sig: Take 75 mcg by mouth Daily (before breakfast). valsartan 320 mg tab 320 mg, hydroCHLOROthiazide 25 mg tab 25 mg   Yes Yes   Sig: Take 1 Dose by mouth daily. Facility-Administered Medications: None     No Known Allergies     Social History   Substance Use Topics    Smoking status: Never Smoker    Smokeless tobacco: Current User     Types: Chew    Alcohol use Yes      Comment: nightly, 1-2 bottles        History reviewed. No pertinent family history. Objective:     Visit Vitals    /82 (BP 1 Location: Left arm, BP Patient Position: At rest)    Pulse 75    Temp 98.5 °F (36.9 °C)    Resp 24    Ht 5' 9\" (1.753 m)    Wt 107 kg (236 lb)    SpO2 98%    BMI 34.85 kg/m2      Temp (24hrs), Av.5 °F (36.9 °C), Min:98.5 °F (36.9 °C), Max:98.5 °F (36.9 °C)    Oxygen Therapy  O2 Sat (%): 98 % (17 1421)  Pulse via Oximetry: 77 beats per minute (17 142)  O2 Device: Room air (17 142)    Physical Exam:  General:    Alert, cooperative, no distress, appears stated age. Head:   Normocephalic, without obvious abnormality, atraumatic. Eyes:   Anicteric sclera, clear conjunctiva, EOMI  Mouth:  Moist oral mucosa  Nose:  Nares normal. No drainage or sinus tenderness. Lungs:   Clear to auscultation bilaterally. No Wheezing or Rhonchi. No rales. Heart:   Regular rate and rhythm,  no murmur, rub or gallop. Abdomen:   Soft, non-tender. Not distended. Bowel sounds normal.   Extremities: No cyanosis. No edema. No clubbing  Skin:     Texture, turgor normal. No rashes or lesions.   Not Jaundiced  Neurologic: Alert and oriented x 3, no focal deficits, sensation equal throughout  Psych:  Normal mood , very anxious, coherent      Data Review:   Recent Results (from the past 24 hour(s))   POC PT/INR    Collection Time: 17 10:37 AM   Result Value Ref Range    Prothrombin time (POC) 11.2 9.6 - 11.6 SECS    INR (POC) 0.9 0.9 - 1.2     GLUCOSE, POC    Collection Time: 17 10:39 AM   Result Value Ref Range    Glucose (POC) 196 (H) 65 - 100 mg/dL   POC TROPONIN-I    Collection Time: 17 10:41 AM   Result Value Ref Range    Troponin-I (POC) 0 (L) 0.02 - 0.05 ng/ml   CBC WITH AUTOMATED DIFF    Collection Time: 17 10:47 AM   Result Value Ref Range    WBC 5.5 4.3 - 11.1 K/uL    RBC 3.92 (L) 4.23 - 5.67 M/uL    HGB 12.3 (L) 13.6 - 17.2 g/dL    HCT 37.0 (L) 41.1 - 50.3 %    MCV 94.4 79.6 - 97.8 FL    MCH 31.4 26.1 - 32.9 PG    MCHC 33.2 31.4 - 35.0 g/dL    RDW 13.3 11.9 - 14.6 %    PLATELET 349 841 - 912 K/uL    MPV 10.6 (L) 10.8 - 14.1 FL    DF AUTOMATED      NEUTROPHILS 48 43 - 78 %    LYMPHOCYTES 36 13 - 44 %    MONOCYTES 12 4.0 - 12.0 %    EOSINOPHILS 3 0.5 - 7.8 %    BASOPHILS 1 0.0 - 2.0 %    IMMATURE GRANULOCYTES 0 0.0 - 5.0 %    ABS. NEUTROPHILS 2.7 1.7 - 8.2 K/UL    ABS. LYMPHOCYTES 2.0 0.5 - 4.6 K/UL    ABS. MONOCYTES 0.7 0.1 - 1.3 K/UL    ABS. EOSINOPHILS 0.1 0.0 - 0.8 K/UL    ABS. BASOPHILS 0.1 0.0 - 0.2 K/UL    ABS. IMM. GRANS. 0.0 0.0 - 0.5 K/UL   METABOLIC PANEL, COMPREHENSIVE    Collection Time: 11/18/17 10:47 AM   Result Value Ref Range    Sodium 138 136 - 145 mmol/L    Potassium 4.2 3.5 - 5.1 mmol/L    Chloride 99 98 - 107 mmol/L    CO2 29 21 - 32 mmol/L    Anion gap 10 7 - 16 mmol/L    Glucose 176 (H) 65 - 100 mg/dL    BUN 14 8 - 23 MG/DL    Creatinine 1.30 0.8 - 1.5 MG/DL    GFR est AA >60 >60 ml/min/1.73m2    GFR est non-AA 59 (L) >60 ml/min/1.73m2    Calcium 9.3 8.3 - 10.4 MG/DL    Bilirubin, total 0.3 0.2 - 1.1 MG/DL    ALT (SGPT) 39 12 - 65 U/L    AST (SGOT) 32 15 - 37 U/L    Alk.  phosphatase 85 50 - 136 U/L    Protein, total 8.3 (H) 6.3 - 8.2 g/dL    Albumin 4.1 3.2 - 4.6 g/dL    Globulin 4.2 (H) 2.3 - 3.5 g/dL    A-G Ratio 1.0 (L) 1.2 - 3.5     EKG, 12 LEAD, INITIAL    Collection Time: 11/18/17 10:49 AM   Result Value Ref Range    Ventricular Rate 96 BPM    Atrial Rate 96 BPM    P-R Interval 176 ms    QRS Duration 88 ms    Q-T Interval 348 ms    QTC Calculation (Bezet) 439 ms    Calculated P Axis 47 degrees    Calculated R Axis -2 degrees    Calculated T Axis 34 degrees    Diagnosis       Normal sinus rhythm  Normal ECG  When compared with ECG of 19-MAR-2015 13:21,  No significant change was found     MRI/CT POC CREATININE    Collection Time: 11/18/17 11:03 AM   Result Value Ref Range    Creatinine (POC) 1.1 0.8 - 1.5 MG/DL    GFRAA, POC >60 >60 ml/min/1.73m2    GFRNA, POC >60 >60 ml/min/1.73m2       Imaging /Procedures /Studies     Ct Head Wo Cont    Result Date: 11/18/2017  IMPRESSION:     NO ACUTE INTRACRANIAL ABNORMALITY IDENTIFIED AT NONCONTRAST CT. Cta Head Neck W Wo Cont    Result Date: 11/18/2017  Impression: Diffuse atherosclerotic wall thickening with scattered calcified plaques. Tortuous common carotid, internal carotid, and vertebral arteries consistent with long-standing hypertension. No significant internal carotid artery stenosis. No intracranial arterial thromboembolism, high-grade stenosis, aneurysm, or vascular malformation. Assessment and Plan:      Active Hospital Problems    Diagnosis Date Noted    Acute CVA (cerebrovascular accident) ([facility] [address] 11/18/2017    Hypertension 11/18/2017    Acquired hypothyroidism 11/18/2017    ADD (attention deficit disorder) 11/18/2017       PLAN  Place on telemetry, Observation  MRI, Carotid doppler, TTE in am  Start ASA and statin  Check lipid panel and A1c  Hold antihypertensive for initial 24hrs, labetalol for SBP>220, DBP>110  PT, OT, SLP eval  Cont synthroid  On Adderall- abrupt cessation not recommended on my research      Code Status: full code  DVT prophylaxis: Lovenox  Anticipated discharge: 1-2 days, pending work up      Signed By: Swetha Ramirez MD     November 18, 2017

## 2017-11-18 NOTE — ED TRIAGE NOTES
States lt sided weakness/numbness x 10 min's ago. Lt sided facial droop with lt arm and leg weakness. Denies hx of stroke.

## 2017-11-18 NOTE — IP AVS SNAPSHOT
Rafaela Fischer 
 
 
 [address] [place], [place] [id number] [facility] 
[phone] Patient: Martha Houston MRN: [id number] About your hospitalization You were admitted on:  November 18, 2017 You last received care in the:  [facility] 3 ICU You were discharged on:  November 19, 2017 Why you were hospitalized Your primary diagnosis was:  Tia (Transient Ischemic Attack) Your diagnoses also included:  Hypertension, Acquired Hypothyroidism, Add (Attention Deficit Disorder), Hyperlipidemia Things You Need To Do (next 8 weeks) Follow up with Jose Manuel Lundy MD  
  
Phone:  [phone] Where:  [facility], [address] [place] [address] Discharge Orders None A check lynette indicates which time of day the medication should be taken. My Medications TAKE these medications as instructed Instructions Each Dose to Equal  
 Morning Noon Evening Bedtime  
 aspirin delayed-release 81 mg tablet Your next dose is: In am  
   
 Take 1 Tab by mouth daily for 30 days. Indications: Cerebral Thromboembolism Prevention 81 mg  
    
   
   
   
  
 atorvastatin 40 mg tablet Commonly known as:  LIPITOR Your next dose is: This pm  
   
 Take 1 Tab by mouth daily for 30 days. Indications: hyperlipidemia 40 mg  
    
   
   
   
  
 dextroamphetamine-amphetamine 20 mg tablet Commonly known as:  ADDERALL Your next dose is: In am.  
   
 Take 20 mg by mouth daily. 20 mg  
    
   
   
   
  
 levothyroxine 75 mcg tablet Commonly known as:  SYNTHROID Your next dose is: In am  
   
 Take 75 mcg by mouth Daily (before breakfast). 75 mcg LEXAPRO 10 mg tablet Generic drug:  escitalopram oxalate Your next dose is: In am.  
   
 Take 10 mg by mouth daily.   
 10 mg  
    
   
   
   
  
 valsartan 320 mg tab 320 mg, hydroCHLOROthiazide 25 mg tab 25 mg Your next dose is: In am.  
   
 Take 1 Dose by mouth daily. 1 Dose Where to Get Your Medications Information on where to get these meds will be given to you by the nurse or doctor. ! Ask your nurse or doctor about these medications  
  aspirin delayed-release 81 mg tablet  
 atorvastatin 40 mg tablet Discharge Instructions Stroke: After Your Visit Your Care Instructions You have had a stroke. Risk factors for stroke include being overweight, smoking, and sedentary lifestyle. This means that the blood flow to a part of your brain was blocked for some time, which damages the nerve cells in that part of the brain. The part of your body controlled by that part of your brain may not function properly now. The brain is an amazing organ that can heal itself to some degree. The stroke you had damaged part of your brain, but other parts of your brain may take over in some way for the damaged areas. You have already started this process. Going home may be hard for you and your family. The more you can try to do for yourself, the better. Remember to take each day one at a time. Follow-up care is a key part of your treatment and safety. Be sure to make and go to all appointments, and call your doctor if you are having problems. Its also a good idea to know your test results and keep a list of the medicines you take. How can you care for yourself at home? Enter a stroke rehabilitation (rehab) program, if your doctor recommends it. Physical, speech, and occupational therapies can help you manage bathing, dressing, eating, and other basics of daily living. Eat a heart-healthy diet that is low in cholesterol, saturated fat, and salt. Eat lots of fresh fruits and vegetables and foods high in fiber. Increase your activities slowly.  Take short rest breaks when you get tired. Gradually increase the amount you walk. Start out by walking a little more than you did the day before. Do not drive until your doctor says it is okay. It is normal to feel sad or depressed after a stroke. If the blues last, talk to your doctor. If you are having problems with urine leakage, go to the bathroom at regular times, including when you first wake up and at bedtime. Also, limit fluids after dinner. If you are constipated, drink plenty of fluids, enough so that your urine is light yellow or clear like water. If you have kidney, heart, or liver disease and have to limit fluids, talk with your doctor before you increase the amount of fluids you drink. Set up a regular time for using the toilet. If you continue to have constipation, your doctor may suggest using a bulking agent, such as Metamucil, or a stool softener, laxative, or enema. Medicines Take your medicines exactly as prescribed. Call your doctor if you think you are having a problem with your medicine. You may be taking several medicines. ACE (angiotensin-converting enzyme) inhibitors, angiotensin II receptor blockers (ARBs), beta-blockers, diuretics (water pills), and calcium channel blockers control your blood pressure. Statins help lower cholesterol. Your doctor may also prescribe medicines for depression, pain, sleep problems, anxiety, or agitation. If your doctor has given you medicine that prevents blood clots, such as warfarin (Coumadin), aspirin combined with extended-release dipyridamole (Aggrenox), clopidogrel (Plavix), or aspirin to prevent another stroke, you should: 
Tell your dentist, pharmacist, and other health professionals that you take these medicines. Watch for unusual bruising or bleeding, such as blood in your urine, red or black stools, or bleeding from your nose or gums. Get regular blood tests to check your clotting time if you are taking Coumadin. Wear medical alert jewelry that says you take blood thinners. You can buy this at most drugstores. Do not take any over-the-counter medicines or herbal products without talking to your doctor first. 
If you take birth control pills or hormone replacement therapy, talk to your doctor about whether they are right for you. For family members and caregivers Make the home safe. Set up a room so that your loved one does not have to climb stairs. Be sure the bathroom is on the same floor. Move throw rugs and furniture that could cause falls, and make sure that the lighting is good. Put grab bars and seats in tubs and showers. Find out what your loved one can do and what he or she needs help with. Try not to do things for your loved one that your loved one can do on his or her own. Help him or her learn and practice new skills. Visit and talk with your loved one often. Try doing activities together that you both enjoy, such as playing cards or board games. Keep in touch with your loved one's friends as much as you can, and encourage them to visit. Take care of yourself. Do not try to do everything yourself. Ask other family members to help. Eat well, get enough rest, and take time to do things that you enjoy. Keep up with your own doctor visits, and make sure to take your medicines regularly. Get out of the house as much as you can. Join a local support group. Find out if you qualify for home health care visits to help with rehab or for adult day care. When should you call for help? Call 911 anytime you think you may need emergency care. For example, call if: 
You have signs of another stroke. These may include: 
Sudden numbness, paralysis, or weakness in your face, arm, or leg, especially on only one side of your body. New problems with walking or balance. Sudden vision changes. Drooling or slurred speech. New problems speaking or understanding simple statements, or you feel confused. A sudden, severe headache that is different from past headaches. Call 911 even if these symptoms go away in a few minutes. You cough up blood. You vomit blood or what looks like coffee grounds. You pass maroon or very bloody stools. Call your doctor now or seek immediate medical care if: 
You have new bruises or blood spots under your skin. You have a nosebleed. Your gums bleed when you brush your teeth. You have blood in your urine. Your stools are black and tarlike or have streaks of blood. You have vaginal bleeding when you are not having your period, or heavy period bleeding. You have new symptoms that may be related to your stroke, such as falls or trouble swallowing. Watch closely for changes in your health, and be sure to contact your doctor if you have any problems. Where can you learn more? Go to [employer]be Enter V031  in the search box to learn more about \"Stroke: After Your Visit\". © 2350-7781 [employer]. Care instructions adapted under license by [employer] (which disclaims liability or warranty for this information). This care instruction is for use with your licensed healthcare professional. If you have questions about a medical condition or this instruction, always ask your healthcare professional. [place] Kon[place] any warranty or liability for your use of this information. Learning About an Ischemic Stroke What is an ischemic stroke? An ischemic (say \"scn-PQA-cann\") stroke occurs when a blood clot blocks a blood vessel in the brain. This means that blood cannot flow to some part of the brain. Without blood and the oxygen it carries, this part of the brain starts to die. The part of the body controlled by the damaged area of the brain cannot work properly. This is different from a hemorrhagic (say \"yky-rlb-PR-jick\") stroke, which happens when a blood vessel in the brain has burst open or has started to leak. The brain damage from a stroke starts within minutes. Quick treatment can help limit damage to the brain and make recovery more likely. People who have had a stroke may have a hard time talking, understanding things, and making decisions. They may have to relearn daily activities, such as how to eat, bathe, and dress. How well someone recovers from a stroke depends on how quickly the person gets to the hospital, where in the brain the stroke happened, and how severe it was. Training and therapy also make a difference in how well people recover. What are the symptoms? If you have any of these symptoms, call 911 or other emergency services right away: 
· You have symptoms of a stroke. These may include: 
¨ Sudden numbness, tingling, weakness, or loss of movement in your face, arm, or leg, especially on only one side of your body. ¨ Sudden vision changes. ¨ Sudden trouble speaking. ¨ Sudden confusion or trouble understanding simple statements. ¨ Sudden problems with walking or balance. ¨ A sudden, severe headache that is different from past headaches. See your doctor if you have symptoms that seem like a stroke, even if they go away quickly. You may have had a transient ischemic attack (TIA), sometimes called a mini-stroke. A TIA is a warning that a stroke may happen soon. Getting early treatment for a TIA can help prevent a stroke. What causes an ischemic stroke? An ischemic stroke is caused by a blood clot that blocks blood flow in the brain. The most common causes of blood clots include: 
· Hardening of the arteries (atherosclerosis). This is caused by high blood pressure, diabetes, high cholesterol, or smoking. · Atrial fibrillation. How is ischemic stroke treated? You may have to take several medicines, depending on what caused your stroke. Ask your doctor if a stroke rehab program is right for you.  Rehab increases your chances of getting back some of the abilities you lost. 
 How can you prevent another stroke? · Work with your doctor to treat any health problems you have. High blood pressure, high cholesterol, atrial fibrillation, and diabetes all raise your chances of having a stroke. · Be safe with medicines. Take your medicine exactly as prescribed. Call your doctor if you think you are having a problem with your medicine. · Have a healthy lifestyle. ¨ Do not smoke or allow others to smoke around you. If you need help quitting, talk to your doctor about stop-smoking programs and medicines. These can increase your chances of quitting for good. Smoking makes a stroke more likely. ¨ Limit alcohol to 2 drinks a day for men and 1 drink a day for women. ¨ Lose weight if you need to. A healthy weight will help you keep your heart and body healthy. ¨ Be active. Ask your doctor what type and level of activity is safe for you. ¨ Eat heart-healthy foods, like fruits, vegetables, and high-fiber foods. Follow-up care is a key part of your treatment and safety. Be sure to make and go to all appointments, and call your doctor if you are having problems. It's also a good idea to know your test results and keep a list of the medicines you take. Where can you learn more? Go to http://alivia-karen.info/. Enter D161 in the search box to learn more about \"Learning About an Ischemic Stroke. \" Current as of: March 20, 2017 Content Version: 11.4 © 3454-8155 Healthwise[employer]. Care instructions adapted under license by [employer] (which disclaims liability or warranty for this information). If you have questions about a medical condition or this instruction, always ask your healthcare professional. [place] [address] any warranty or liability for your use of this information. Introducing [facility]!    
 [address] introduces [employer] patient portal. Now you can access parts of your medical record, email your doctor's office, and request medication refills online. 1. In your internet browser, go to [email or web address] 2. Click on the First Time User? Click Here link in the Sign In box. You will see the New Member Sign Up page. 3. Enter your [employer] Access Code exactly as it appears below. You will not need to use this code after youve completed the sign-up process. If you do not sign up before the expiration date, you must request a new code. · [employer] Access Code: [email or web address] Expires: 2/16/2018 10:30 AM 
 
4. Enter the last four digits of your Social Security Number (xxxx) and Date of Birth (mm/dd/yyyy) as indicated and click Submit. You will be taken to the next sign-up page. 5. Create a MolecuLight ID. This will be your [employer] login ID and cannot be changed, so think of one that is secure and easy to remember. 6. Create a [employer] password. You can change your password at any time. 7. Enter your Password Reset Question and Answer. This can be used at a later time if you forget your password. 8. Enter your e-mail address. You will receive e-mail notification when new information is available in [address] 9. Click Sign Up. You can now view and download portions of your medical record. 10. Click the Download Summary menu link to download a portable copy of your medical information. If you have questions, please visit the Frequently Asked Questions section of the [employer] website. Remember, [employer] is NOT to be used for urgent needs. For medical emergencies, dial 911. Now available from your iPhone and Android! Providers Seen During Your Hospitalization Provider Specialty Primary office phone Pedro Luis Smalls MD Emergency Medicine [phone] Mariela Jane MD Internal Medicine [phone] Immunizations Administered for This Admission Name Date Influenza Vaccine (Quad) PF 11/19/2017 Your Primary Care Physician (PCP) Primary Care Physician Office Phone Office Fax Pamela Guzman [phone] You are allergic to the following No active allergies Recent Documentation Height Weight BMI Smoking Status 1.753 m 106.8 kg 34.78 kg/m2 Never Smoker Emergency Contacts Name Discharge Info Relation Home Work Mobile Henna Cobb  Spouse [3] [phone] Patient Belongings The following personal items are in your possession at time of discharge: 
  Dental Appliances: None  Visual Aid: At bedside, Glasses      Home Medications: Kept at bedside   Jewelry: [address]: At bedside, Footwear, Pants, Undergarments, With patient    Other Valuables: Cell Phone Please provide this summary of care documentation to your next provider. Signatures-by signing, you are acknowledging that this After Visit Summary has been reviewed with you and you have received a copy. Patient Signature:  ____________________________________________________________ Date:  ____________________________________________________________  
  
Eyal Yao Provider Signature:  ____________________________________________________________ Date:  ____________________________________________________________

## 2017-11-19 ENCOUNTER — APPOINTMENT (OUTPATIENT)
Dept: MRI IMAGING | Age: 62
End: 2017-11-19
Attending: HOSPITALIST
Payer: COMMERCIAL

## 2017-11-19 VITALS
HEART RATE: 80 BPM | BODY MASS INDEX: 34.88 KG/M2 | SYSTOLIC BLOOD PRESSURE: 150 MMHG | OXYGEN SATURATION: 98 % | RESPIRATION RATE: 20 BRPM | DIASTOLIC BLOOD PRESSURE: 94 MMHG | HEIGHT: 69 IN | TEMPERATURE: 97.6 F | WEIGHT: 235.5 LBS

## 2017-11-19 PROBLEM — E78.5 HYPERLIPIDEMIA: Status: ACTIVE | Noted: 2017-11-19

## 2017-11-19 PROBLEM — G45.9 TIA (TRANSIENT ISCHEMIC ATTACK): Status: ACTIVE | Noted: 2017-11-18

## 2017-11-19 LAB
CHOLEST SERPL-MCNC: 233 MG/DL
EST. AVERAGE GLUCOSE BLD GHB EST-MCNC: 134 MG/DL
HBA1C MFR BLD: 6.3 % (ref 4.8–6)
HDLC SERPL-MCNC: 71 MG/DL (ref 40–60)
HDLC SERPL: 3.3 {RATIO}
LDLC SERPL CALC-MCNC: 133.2 MG/DL
LIPID PROFILE,FLP: ABNORMAL
TRIGL SERPL-MCNC: 144 MG/DL (ref 35–150)
VLDLC SERPL CALC-MCNC: 28.8 MG/DL (ref 6–23)

## 2017-11-19 PROCEDURE — 83036 HEMOGLOBIN GLYCOSYLATED A1C: CPT | Performed by: HOSPITALIST

## 2017-11-19 PROCEDURE — 36415 COLL VENOUS BLD VENIPUNCTURE: CPT | Performed by: HOSPITALIST

## 2017-11-19 PROCEDURE — 74011250636 HC RX REV CODE- 250/636: Performed by: HOSPITALIST

## 2017-11-19 PROCEDURE — 74011250637 HC RX REV CODE- 250/637: Performed by: HOSPITALIST

## 2017-11-19 PROCEDURE — 97161 PT EVAL LOW COMPLEX 20 MIN: CPT

## 2017-11-19 PROCEDURE — 90471 IMMUNIZATION ADMIN: CPT

## 2017-11-19 PROCEDURE — 97165 OT EVAL LOW COMPLEX 30 MIN: CPT

## 2017-11-19 PROCEDURE — 92610 EVALUATE SWALLOWING FUNCTION: CPT

## 2017-11-19 PROCEDURE — 70551 MRI BRAIN STEM W/O DYE: CPT

## 2017-11-19 PROCEDURE — 74011000250 HC RX REV CODE- 250: Performed by: HOSPITALIST

## 2017-11-19 PROCEDURE — 90686 IIV4 VACC NO PRSV 0.5 ML IM: CPT | Performed by: HOSPITALIST

## 2017-11-19 PROCEDURE — C8929 TTE W OR WO FOL WCON,DOPPLER: HCPCS

## 2017-11-19 PROCEDURE — 80061 LIPID PANEL: CPT | Performed by: HOSPITALIST

## 2017-11-19 PROCEDURE — 99218 HC RM OBSERVATION: CPT

## 2017-11-19 RX ORDER — ASPIRIN 81 MG/1
81 TABLET ORAL DAILY
Qty: 30 TAB | Refills: 0 | Status: SHIPPED | OUTPATIENT
Start: 2017-11-19 | End: 2017-12-19

## 2017-11-19 RX ORDER — ATORVASTATIN CALCIUM 40 MG/1
40 TABLET, FILM COATED ORAL DAILY
Qty: 30 TAB | Refills: 0 | Status: SHIPPED | OUTPATIENT
Start: 2017-11-19 | End: 2017-12-19

## 2017-11-19 RX ADMIN — ASPIRIN 325 MG ORAL TABLET 325 MG: 325 PILL ORAL at 09:10

## 2017-11-19 RX ADMIN — LEVOTHYROXINE SODIUM 75 MCG: 50 TABLET ORAL at 07:29

## 2017-11-19 RX ADMIN — FOLIC ACID 1 MG: 1 TABLET ORAL at 09:10

## 2017-11-19 RX ADMIN — LORAZEPAM 1 MG: 1 TABLET ORAL at 09:15

## 2017-11-19 RX ADMIN — Medication 100 MG: at 09:10

## 2017-11-19 RX ADMIN — INFLUENZA VIRUS VACCINE 0.5 ML: 15; 15; 15; 15 SUSPENSION INTRAMUSCULAR at 14:32

## 2017-11-19 RX ADMIN — DEXTROAMPHETAMINE SACCHARATE, AMPHETAMINE ASPARTATE, DEXTROAMPHETAMINE SULFATE AND AMPHETAMINE SULFATE 20 MG: 2.5; 2.5; 2.5; 2.5 TABLET ORAL at 09:10

## 2017-11-19 RX ADMIN — PERFLUTREN 1 ML: 6.52 INJECTION, SUSPENSION INTRAVENOUS at 09:00

## 2017-11-19 RX ADMIN — ESCITALOPRAM OXALATE 10 MG: 10 TABLET ORAL at 09:10

## 2017-11-19 NOTE — PROGRESS NOTES
Problem: Self Care Deficits Care Plan (Adult)  Goal: *Acute Goals and Plan of Care (Insert Text)    OCCUPATIONAL THERAPY: Initial Assessment and Discharge 11/19/2017  OBSERVATION: Hospital Day: 2  Payor: Sheridan Perla / Plan: Karley Kaba PPO / Product Type: PPO /      NAME/AGE/GENDER: Noelle Nick is a 57 y.o. male   PRIMARY DIAGNOSIS:  Acute CVA (cerebrovascular accident) ([address] Acute CVA (cerebrovascular accident) ([address] Acute CVA (cerebrovascular accident) ([address]        ICD-10: Treatment Diagnosis:    · Other lack of cordination (R27.8)   Precautions/Allergies:    Review of patient's allergies indicates no known allergies. ASSESSMENT:     Mr. Gabriel Kemp presents with left sided UE and LE numbness and tingling along with left facial droop status post suspected acute CVA. Mr. Gabriel Kemp reported he noticed numbness and tingling in his face and left arm and could grasp his cup while at home. He called his brother and they proceeded immediately to the hospital. During evaluation, Mr. Gabriel Kemp demonstrated [facility] UE strength and ROM and independence with donning shoes at EOB. He ambulated with SBA and no gait aid. Therapist noticed increased input placed through left foot and heal due to sensory deficits. He reported increased numbness and tingling in face and UE/LE during ambulation. Patient reported primary concerns regarded his sensory deficits and the unknown of current condition. While Mr. Gabriel Kemp is functionally demonstrating good skills, he will benefit from supervision assistance and monitoring regarding sensory deficits. He will not need OT services at this current time, however, nursing notified to report any concerns is sensory and motor deficits increase. This section established at most recent assessment   PROBLEM LIST (Impairments causing functional limitations):  1. N/A   INTERVENTIONS PLANNED: (Benefits and precautions of occupational therapy have been discussed with the patient.)  1.  N/A     TREATMENT PLAN: Frequency/Duration: Follow patient 0x to address above goals. Rehabilitation Potential For Stated Goals: No skilled services AT THIS TIME     RECOMMENDED REHABILITATION/EQUIPMENT: (at time of discharge pending progress): Due to the probability of continued deficits (see above) this patient will not likely need continued skilled occupational therapy after discharge. Equipment:    None at this time              OCCUPATIONAL PROFILE AND HISTORY:   History of Present Injury/Illness (Reason for Referral): Independent at home, not currently working. Mr. Bisi Garner lives on the 3rd floor apartment with 36 stairs to get to home. Past Medical History/Comorbidities:   Mr. Bisi Garner  has a past medical history of Chronic kidney disease; Hypertension; Stroke [facility]) (11/2017); and Thyroid disease. Mr. Bisi Garner  has no past surgical history on file. Social History/Living Environment:   Home Environment: Apartment  # Steps to Enter: 39  Rails to Enter: Yes  Hand Rails : Bilateral  Wheelchair Ramp: No  One/Two Story Residence: Other (Comment) (3 story apartment, lives on 3rd floor)  # of Interior Steps: 0  Interior Rails: None  Living Alone: Yes  Support Systems: Child(anuradha), Family member(s) (Brother who lives in same apartment complex)  Patient Expects to be Discharged to[de-identified] Private residence  Current DME Used/Available at Home: Grab bars  Tub or Shower Type: Tub/Shower combination     Prior Level of Function/Work/Activity:    Not currently employed.       Number of Personal Factors/Comorbidities that affect the Plan of Care: Brief history (0):  LOW COMPLEXITY   ASSESSMENT OF OCCUPATIONAL PERFORMANCE[de-identified]   Activities of Daily Living:         Independent with all ADLs and IADLs  Basic ADLs (From Assessment) Complex ADLs (From Assessment)   Basic ADL  Feeding: Independent  Oral Facial Hygiene/Grooming: Independent  Bathing: Supervision  Upper Body Dressing: Independent  Lower Body Dressing: Independent  Toileting: Independent Instrumental ADL  Meal Preparation: Supervision  Homemaking: Supervision  Medication Management: Supervision  Financial Management: Supervision   Grooming/Bathing/Dressing Activities of Daily Living     Cognitive Retraining  Safety/Judgement: Awareness of environment; Insight into deficits                 Functional Transfers  Toilet Transfer : Supervision  Tub Transfer: Supervision  Shower Transfer: Supervision     Bed/Mat Mobility  Sit to Stand: Stand-by asssistance       Most Recent Physical Functioning:   Gross Assessment:                  Posture:     Balance:    Bed Mobility: supervision     Wheelchair Mobility:     Transfers:  Sit to Stand: Stand-by asssistance                Patient Vitals for the past 6 hrs:   BP SpO2 Pulse   17 0314 144/88 - 72   17 0414 116/69 98 % 72   17 0514 104/64 - 69   17 0614 131/84 - 70       Mental Status  Neurologic State: Alert, Appropriate for age  Orientation Level: Oriented X4  Cognition: Follows commands, Appropriate for age attention/concentration  Perception: Appears intact  Perseveration: No perseveration noted  Safety/Judgement: Awareness of environment, Insight into deficits                          Physical Skills Involved:  1. NA Cognitive Skills Affected (resulting in the inability to perform in a timely and safe manner):  1. NA Psychosocial Skills Affected:  1. NA   Number of elements that affect the Plan of Care: 1-3:  LOW COMPLEXITY   CLINICAL DECISION MAKIN [facility] [address] [address] AM-PAC 6 Clicks   Daily Activity Inpatient Short Form  How much help from another person does the patient currently need. .. Total A Lot A Little None   1. Putting on and taking off regular lower body clothing? [] 1   [] 2   [] 3   [x] 4   2. Bathing (including washing, rinsing, drying)? [] 1   [] 2   [] 3   [x] 4   3. Toileting, which includes using toilet, bedpan or urinal?   [] 1   [] 2   [] 3   [x] 4   4. Putting on and taking off regular upper body clothing?    [] 1   [] 2   [] 3   [x] 4   5. Taking care of personal grooming such as brushing teeth? [] 1   [] 2   [] 3   [x] 4   6. Eating meals? [] 1   [] 2   [] 3   [x] 4   © 2007, Trustees of [address] [place] [address] [address] [address], under license to [employer]. All rights reserved      Score:  Initial: 24 Most Recent: X (Date: -- )    Interpretation of Tool:  Represents activities that are increasingly more difficult (i.e. Bed mobility, Transfers, Gait). Score 24 23 22-20 19-15 14-10 9-7 6     Modifier CH CI CJ CK CL CM CN      ? Self Care:     - CURRENT STATUS: CH - 0% impaired, limited or restricted    - GOAL STATUS: CH - 0% impaired, limited or restricted    - D/C STATUS:  [address] [place] [address] - 0% impaired, limited or restricted  Payor: MARCE / Plan: Tootie Gooden PPO / Product Type: PPO /      Medical Necessity:     · No skilled intervention at this time. Reason for Services/Other Comments:  ·    Use of outcome tool(s) and clinical judgement create a POC that gives a: LOW COMPLEXITY         TREATMENT:   (In addition to Assessment/Re-Assessment sessions the following treatments were rendered)     Pre-treatment Symptoms/Complaints:  No pain reported  Pain: Initial:   Pain Intensity 1: 0  Post Session:  0     Assessment/Reassessment only, no treatment provided today    Braces/Orthotics/Lines/Etc:   · heart monitor and BP cuff  · O2 Device: Room air  Treatment/Session Assessment:    · Response to Treatment:  Tolerated session well  · Interdisciplinary Collaboration:   o Physical Therapist  o Occupational Therapist  o Registered Nurse  · After treatment position/precautions:   o Supine in bed  o Bed/Chair-wheels locked  o Call light within reach  o RN notified   · Compliance with Program/Exercises: compliant all of the time.   · Recommendations/Intent for next treatment session:  no follow-up  Total Treatment Duration: 15 minutes  OT Patient Time In/Time Out  Time In: 0815  Time Out: 14[facility], OT   11/19/17

## 2017-11-19 NOTE — PROGRESS NOTES
Bedside shift change report given to Shanita (oncoming nurse) by Valeria Garrett RN (offgoing nurse). Report included the following information Kardex, Intake/Output, MAR, Recent Results, Med Rec Status and Cardiac Rhythm SR. Alert and oriented x4. Continued numbness of left side along with left sided weakness, no change.

## 2017-11-19 NOTE — DISCHARGE INSTRUCTIONS
Stroke: After Your Visit     Your Care Instructions     You have had a stroke. Risk factors for stroke include being overweight, smoking, and sedentary lifestyle. This means that the blood flow to a part of your brain was blocked for some time, which damages the nerve cells in that part of the brain. The part of your body controlled by that part of your brain may not function properly now. The brain is an amazing organ that can heal itself to some degree. The stroke you had damaged part of your brain, but other parts of your brain may take over in some way for the damaged areas. You have already started this process. Going home may be hard for you and your family. The more you can try to do for yourself, the better. Remember to take each day one at a time. Follow-up care is a key part of your treatment and safety. Be sure to make and go to all appointments, and call your doctor if you are having problems. Its also a good idea to know your test results and keep a list of the medicines you take. How can you care for yourself at home? Enter a stroke rehabilitation (rehab) program, if your doctor recommends it. Physical, speech, and occupational therapies can help you manage bathing, dressing, eating, and other basics of daily living. Eat a heart-healthy diet that is low in cholesterol, saturated fat, and salt. Eat lots of fresh fruits and vegetables and foods high in fiber. Increase your activities slowly. Take short rest breaks when you get tired. Gradually increase the amount you walk. Start out by walking a little more than you did the day before. Do not drive until your doctor says it is okay. It is normal to feel sad or depressed after a stroke. If the blues last, talk to your doctor. If you are having problems with urine leakage, go to the bathroom at regular times, including when you first wake up and at bedtime. Also, limit fluids after dinner.   If you are constipated, drink plenty of fluids, enough so that your urine is light yellow or clear like water. If you have kidney, heart, or liver disease and have to limit fluids, talk with your doctor before you increase the amount of fluids you drink. Set up a regular time for using the toilet. If you continue to have constipation, your doctor may suggest using a bulking agent, such as Metamucil, or a stool softener, laxative, or enema. Medicines  Take your medicines exactly as prescribed. Call your doctor if you think you are having a problem with your medicine. You may be taking several medicines. ACE (angiotensin-converting enzyme) inhibitors, angiotensin II receptor blockers (ARBs), beta-blockers, diuretics (water pills), and calcium channel blockers control your blood pressure. Statins help lower cholesterol. Your doctor may also prescribe medicines for depression, pain, sleep problems, anxiety, or agitation. If your doctor has given you medicine that prevents blood clots, such as warfarin (Coumadin), aspirin combined with extended-release dipyridamole (Aggrenox), clopidogrel (Plavix), or aspirin to prevent another stroke, you should:  Tell your dentist, pharmacist, and other health professionals that you take these medicines. Watch for unusual bruising or bleeding, such as blood in your urine, red or black stools, or bleeding from your nose or gums. Get regular blood tests to check your clotting time if you are taking Coumadin. Wear medical alert jewelry that says you take blood thinners. You can buy this at most drugstores. Do not take any over-the-counter medicines or herbal products without talking to your doctor first.  If you take birth control pills or hormone replacement therapy, talk to your doctor about whether they are right for you. For family members and caregivers  Make the home safe. Set up a room so that your loved one does not have to climb stairs. Be sure the bathroom is on the same floor.  Move throw rugs and furniture that could cause falls, and make sure that the lighting is good. Put grab bars and seats in tubs and showers. Find out what your loved one can do and what he or she needs help with. Try not to do things for your loved one that your loved one can do on his or her own. Help him or her learn and practice new skills. Visit and talk with your loved one often. Try doing activities together that you both enjoy, such as playing cards or board games. Keep in touch with your loved one's friends as much as you can, and encourage them to visit. Take care of yourself. Do not try to do everything yourself. Ask other family members to help. Eat well, get enough rest, and take time to do things that you enjoy. Keep up with your own doctor visits, and make sure to take your medicines regularly. Get out of the house as much as you can. Join a local support group. Find out if you qualify for home health care visits to help with rehab or for adult day care. When should you call for help? Call 911 anytime you think you may need emergency care. For example, call if:  You have signs of another stroke. These may include:  Sudden numbness, paralysis, or weakness in your face, arm, or leg, especially on only one side of your body. New problems with walking or balance. Sudden vision changes. Drooling or slurred speech. New problems speaking or understanding simple statements, or you feel confused. A sudden, severe headache that is different from past headaches. Call 911 even if these symptoms go away in a few minutes. You cough up blood. You vomit blood or what looks like coffee grounds. You pass maroon or very bloody stools. Call your doctor now or seek immediate medical care if:  You have new bruises or blood spots under your skin. You have a nosebleed. Your gums bleed when you brush your teeth. You have blood in your urine. Your stools are black and tarlike or have streaks of blood.   You have vaginal bleeding when you are not having your period, or heavy period bleeding. You have new symptoms that may be related to your stroke, such as falls or trouble swallowing. Watch closely for changes in your health, and be sure to contact your doctor if you have any problems. Where can you learn more? Go to Ramamia.be    Enter C294  in the search box to learn more about \"Stroke: After Your Visit\". © 8513-3145 Healthwise, Incorporated. Care instructions adapted under license by [employer] (which disclaims liability or warranty for this information). This care instruction is for use with your licensed healthcare professional. If you have questions about a medical condition or this instruction, always ask your healthcare professional. Minor Konig any warranty or liability for your use of this information. Learning About an Ischemic Stroke  What is an ischemic stroke? An ischemic (say \"ovn-BHN-mxwv\") stroke occurs when a blood clot blocks a blood vessel in the brain. This means that blood cannot flow to some part of the brain. Without blood and the oxygen it carries, this part of the brain starts to die. The part of the body controlled by the damaged area of the brain cannot work properly. This is different from a hemorrhagic (say \"frm-niy-HB-jick\") stroke, which happens when a blood vessel in the brain has burst open or has started to leak. The brain damage from a stroke starts within minutes. Quick treatment can help limit damage to the brain and make recovery more likely. People who have had a stroke may have a hard time talking, understanding things, and making decisions. They may have to relearn daily activities, such as how to eat, bathe, and dress. How well someone recovers from a stroke depends on how quickly the person gets to the hospital, where in the brain the stroke happened, and how severe it was.  Training and therapy also make a difference in how well people recover. What are the symptoms? If you have any of these symptoms, call 911 or other emergency services right away:  · You have symptoms of a stroke. These may include:  ¨ Sudden numbness, tingling, weakness, or loss of movement in your face, arm, or leg, especially on only one side of your body. ¨ Sudden vision changes. ¨ Sudden trouble speaking. ¨ Sudden confusion or trouble understanding simple statements. ¨ Sudden problems with walking or balance. ¨ A sudden, severe headache that is different from past headaches. See your doctor if you have symptoms that seem like a stroke, even if they go away quickly. You may have had a transient ischemic attack (TIA), sometimes called a mini-stroke. A TIA is a warning that a stroke may happen soon. Getting early treatment for a TIA can help prevent a stroke. What causes an ischemic stroke? An ischemic stroke is caused by a blood clot that blocks blood flow in the brain. The most common causes of blood clots include:  · Hardening of the arteries (atherosclerosis). This is caused by high blood pressure, diabetes, high cholesterol, or smoking. · Atrial fibrillation. How is ischemic stroke treated? You may have to take several medicines, depending on what caused your stroke. Ask your doctor if a stroke rehab program is right for you. Rehab increases your chances of getting back some of the abilities you lost.  How can you prevent another stroke? · Work with your doctor to treat any health problems you have. High blood pressure, high cholesterol, atrial fibrillation, and diabetes all raise your chances of having a stroke. · Be safe with medicines. Take your medicine exactly as prescribed. Call your doctor if you think you are having a problem with your medicine. · Have a healthy lifestyle. ¨ Do not smoke or allow others to smoke around you. If you need help quitting, talk to your doctor about stop-smoking programs and medicines.  These can increase your chances of quitting for good. Smoking makes a stroke more likely. ¨ Limit alcohol to 2 drinks a day for men and 1 drink a day for women. ¨ Lose weight if you need to. A healthy weight will help you keep your heart and body healthy. ¨ Be active. Ask your doctor what type and level of activity is safe for you. ¨ Eat heart-healthy foods, like fruits, vegetables, and high-fiber foods. Follow-up care is a key part of your treatment and safety. Be sure to make and go to all appointments, and call your doctor if you are having problems. It's also a good idea to know your test results and keep a list of the medicines you take. Where can you learn more? Go to http://alivia-karne.info/. Enter D161 in the search box to learn more about \"Learning About an Ischemic Stroke. \"  Current as of: March 20, 2017  Content Version: 11.4  © 2984-5298 Healthwise, Incorporated. Care instructions adapted under license by [employer] (which disclaims liability or warranty for this information). If you have questions about a medical condition or this instruction, always ask your healthcare professional. [place] [address] any warranty or liability for your use of this information.

## 2017-11-19 NOTE — PROGRESS NOTES
Smokeless tobacco product at bedside. Encouraged  to not use and informed him [facility] is a tobacco free facility. Verbalized understanding.

## 2017-11-19 NOTE — PROGRESS NOTES
Bedside shift change report given to Scott Mejia RN (oncoming nurse) by Holden Knox (offgoing nurse). Report included the following information Kardex, Intake/Output, MAR, Recent Results, Med Rec Status and Cardiac Rhythm sr. Alert and oriented x4 on room air. Left arm and left side of face numbness continues without change per Mr. Evelyn Starr. No s/sx of distress observed at time of report.

## 2017-11-19 NOTE — PROGRESS NOTES
Problem: Mobility Impaired (Adult and Pediatric)  Goal: *Acute Goals and Plan of Care (Insert Text)  GOALS MET :  (1.)Mr. Vviiana Langley will move from supine to sit and sit to supine  with INDEPENDENT. (2.)Mr. Viviana Langley will transfer from bed to chair and chair to bed with SUPERVISION. (3.)Mr. Viviana Langley will ambulate with SUPERVISION for 200 feet. 4) pt ambulating up & down 24 steps with rail & supervision. ______________    PHYSICAL THERAPY: Initial Assessment, Discharge, Treatment Day: Day of Assessment, AM 11/19/2017  OBSERVATION: Hospital Day: 2  Payor: Sisi Workman / Plan: Kasey Jane PPO / Product Type: PPO /      NAME/AGE/GENDER: Karen Kelley is a 57 y.o. male   PRIMARY DIAGNOSIS: Acute CVA (cerebrovascular accident) ([facility] Acute CVA (cerebrovascular accident) ([facility] Acute CVA (cerebrovascular accident) ([facility]        ICD-10: Treatment Diagnosis:   · Generalized Muscle Weakness (M62.81)   Precaution/Allergies:  Review of patient's allergies indicates no known allergies. ASSESSMENT:     Mr. Viviana Langley presents with reported decreased sensation in left facial region with slight droop, UE & LE. No remarkable functional deficits observed. Pt had increased numbness in facial left arm & left with increased physical activity & intensity. This pt had no visual or speech changes,, but concerning sensory changes with activity. Echo & MRI still pending, RN made aware if any physical changes develop, PT will be glad to re-assess pt's status. This section established at most recent assessment   PROBLEM LIST (Impairments causing functional limitations):  1. NA   INTERVENTIONS PLANNED: (Benefits and precautions of physical therapy have been discussed with the patient.)  1.  NA     TREATMENT PLAN: Frequency/Duration:NA  Rehabilitation Potential For Stated Goals: NA     RECOMMENDED REHABILITATION/EQUIPMENT: (at time of discharge pending progress): Due to the probability of continued deficits (see above) this patient will not likely need continued skilled physical therapy after discharge. Equipment:    None at this time              HISTORY:   History of Present Injury/Illness (Reason for Referral):  62F with HTN and hypothyroidism presented with acute onset LUE and LLE weakness and numbness. He also noted numbness that started in the left face then progressed. Patient reported that he was sitting at kitchen table with symptoms started. The LUE became weakness and he was unable to make a firm fist or open hands. NO LOC, lightheadedness, dizziness, SOB, chest pain or palpitation. Past Medical History/Comorbidities:   Mr. Andrey Luu  has a past medical history of Chronic kidney disease; Hypertension; Stroke [facility]) (11/2017); and Thyroid disease. Mr. Andrey Luu  has no past surgical history on file. Social History/Living Environment:   Home Environment: Apartment  # Steps to Enter: 39  Rails to Enter: Yes  [employer] : Bilateral (but wide)  Wheelchair Ramp: No  One/Two Story Residence: One story  # of Interior Steps: 0  Interior Rails: None  Living Alone: Yes  Support Systems: Child(anuradha), Family member(s)  Patient Expects to be Discharged to[de-identified] Private residence  Current DME Used/Available at Home:  (none)  Tub or Shower Type: Tub/Shower combination  Prior Level of Function/Work/Activity:  Pt was independent without an assistive device prior to this admission. Personal Factors: Other factors that influence how disability is experienced by the patient:  Current & PMH   Number of Personal Factors/Comorbidities that affect the Plan of Care: 3+: HIGH COMPLEXITY   EXAMINATION:   Most Recent Physical Functioning:   Gross Assessment:  AROM: Within functional limits (both LE's & core)  Strength: Within functional limits (both LE's & core)  Coordination: Within functional limits (both LE's & core)                    Balance:  Sitting: Intact; Without support  Standing: Intact; Without support Bed Mobility:  Supine to Sit: Independent  Sit to Supine: Independent  Scooting: Independent       Transfers:  Sit to Stand: Supervision  Stand to Sit: Supervision  Bed to Chair: Supervision  Gait:     Speed/Ewa: Fluctuations  Gait Abnormalities: Decreased step clearance  Distance (ft): 200 Feet (ft)  Assistive Device:  (none)  Ambulation - Level of Assistance: Supervision  Number of Stairs Trained: 24  Stairs - Level of Assistance: Supervision  Rail Use: Left    Functional Mobility:         Gait/Ambulation:  sup        Transfers:  sup        Bed Mobility:  ind   Body Structures Involved:  1. Muscles Body Functions Affected:  1. Sensory/Pain  2. Movement Related Activities and Participation Affected:  1. General Tasks and Demands  2. Mobility   Number of elements that affect the Plan of Care: 4+: HIGH COMPLEXITY   CLINICAL PRESENTATION:   Presentation: Stable and uncomplicated: LOW COMPLEXITY   CLINICAL DECISION MAKIN[address] [place] [id number] AM-PAC 6 Clicks   Basic Mobility Inpatient Short Form  How much difficulty does the patient currently have. .. Unable A Lot A Little None   1. Turning over in bed (including adjusting bedclothes, sheets and blankets)? [] 1   [] 2   [] 3   [x] 4   2. Sitting down on and standing up from a chair with arms ( e.g., wheelchair, bedside commode, etc.)   [] 1   [] 2   [] 3   [x] 4   3. Moving from lying on back to sitting on the side of the bed? [] 1   [] 2   [] 3   [x] 4   How much help from another person does the patient currently need. .. Total A Lot A Little None   4. Moving to and from a bed to a chair (including a wheelchair)? [] 1   [] 2   [] 3   [x] 4   5. Need to walk in hospital room? [] 1   [] 2   [] 3   [x] 4   6. Climbing 3-5 steps with a railing? [] 1   [] 2   [] 3   [x] 4   © , Trustees of [address] [place] [address], under license to [employer].  All rights reserved      Score:  Initial: 24 Most Recent: X (Date: -- )    Interpretation of Tool:  Represents activities that are increasingly more difficult (i.e. Bed mobility, Transfers, Gait). Score 24 23 22-20 19-15 14-10 9-7 6     Modifier CH CI CJ CK CL CM CN      ? Mobility - Walking and Moving Around:     - CURRENT STATUS: CH - 0% impaired, limited or restricted    - GOAL STATUS: CH - 0% impaired, limited or restricted    - D/C STATUS:  CH - 0% impaired, limited or restricted  Payor: MARCE / Plan: Roy Ormond PPO / Product Type: PPO /      Medical Necessity:     · no skilled PT needed at this time. Reason for Services/Other Comments:  · no skilled PT needed at this time. Use of outcome tool(s) and clinical judgement create a POC that gives a: Clear prediction of patient's progress: LOW COMPLEXITY            TREATMENT:   (In addition to Assessment/Re-Assessment sessions the following treatments were rendered)   Pre-treatment Symptoms/Complaints:  Left sided numbness that increases with activity  Pain: Initial: visual scale  Pain Intensity 1: 0  Post Session:  0/10     Assessment/Reassessment only, no treatment provided today    Braces/Orthotics/Lines/Etc:   · IV  · icu monitors  Treatment/Session Assessment:    · Response to Treatment:  Tolerated well but pt very concerned  · Interdisciplinary Collaboration:   o Occupational Therapist  o Registered Nurse  · After treatment position/precautions:   o Supine in bed  o Bed/Chair-wheels locked  o Bed in low position  o Call light within reach  o RN notified   · Compliance with Program/Exercises: compliant all of the time. · Recommendations/ discharge PT  Services but follow up if any remarkable deficits develope.   Total Treatment Duration:  PT Patient Time In/Time Out  Time In: 0800  Time Out: 0815    Charlette Talbert PT

## 2017-11-19 NOTE — DISCHARGE SUMMARY
Physician Discharge Summary       Patient: Pat Deluna MRN: [id number]  SSN: [id number]    YOB: 1955  Age: 57 y.o. Sex: male    PCP: Aydin Sherwood MD    Admit date: 11/18/2017  Admitting Provider: Sandip Ott MD    Discharge date: 11/19/2017  Discharging Provider: Sandip Ott MD    * Admission Diagnoses: Acute CVA (cerebrovascular accident) [facility])    * Discharge Diagnoses:    Hospital Problems as of 11/19/2017  Never Reviewed          Codes Class Noted - Resolved POA    Hyperlipidemia ICD-10-CM: E78.5  ICD-9-CM: 272.4  11/19/2017 - Present Yes        * (Principal)TIA (transient ischemic attack) ICD-10-CM: G45.9  ICD-9-CM: 435.9  11/18/2017 - Present Yes        Hypertension ICD-10-CM: I10  ICD-9-CM: 401.9  11/18/2017 - Present Yes        Acquired hypothyroidism (Chronic) ICD-10-CM: E03.9  ICD-9-CM: 244.9  11/18/2017 - Present Yes        ADD (attention deficit disorder) (Chronic) ICD-10-CM: F98.8  ICD-9-CM: 314.00  11/18/2017 - Present Yes              * Hospital Course:   41J with HTN and hypothyroidism presented with acute onset LUE and LLE weakness and numbness. He also noted numbness that started in the left face then progressed. Patient reported that he was sitting at kitchen table with symptoms started. The LUE became weakness and he was unable to make a firm fist or open hands. NO LOC, lightheadedness, dizziness, SOB, chest pain or palpitation. CT and CTA head were unremarkable. Teleneuro consulted in ER  Patient admitted to telemetry. No arrhythmias noted. Check of lipid panel revealed  which is not at goal. A1c was slightly above normal at 6.3. Patient counseled on recommended dietary changes in addition to cholesterol medication. MRI brain did not reveal any acute infarct, instead only small vessel changes. ASA and statin was added to patient regimen. Patient counseled about cessation of tobacco and alcohol.    Patient is stable to be DC home       * Procedures:   * No surgery found *      Consults: Neurology in ER    Significant Diagnostic Studies: labs and radiology  Mri Brain Wo Cont    Result Date: 11/19/2017  IMPRESSION:  MILD SMALL VESSEL ISCHEMIC DISEASE WITH NO ACUTE INTRACRANIAL ABNORMALITY IDENTIFIED. Ct Head Wo Cont    Result Date: 11/18/2017  IMPRESSION:     NO ACUTE INTRACRANIAL ABNORMALITY IDENTIFIED AT NONCONTRAST CT. Cta Head Neck W Wo Cont    Result Date: 11/18/2017  Impression: Diffuse atherosclerotic wall thickening with scattered calcified plaques. Tortuous common carotid, internal carotid, and vertebral arteries consistent with long-standing hypertension. No significant internal carotid artery stenosis. No intracranial arterial thromboembolism, high-grade stenosis, aneurysm, or vascular malformation. Duplex Carotid Bilateral    Result Date: 11/19/2017  IMPRESSION:  NAHUN:  No hemodynamically significant stenosis. LICA:  No hemodynamically significant stenosis. As already stated and the CTA, there is atherosclerotic disease involving both common carotid and internal carotid arteries.     Lab Results   Component Value Date/Time    Cholesterol, total 233 11/19/2017 04:10 AM    HDL Cholesterol 71 11/19/2017 04:10 AM    LDL, calculated 133.2 11/19/2017 04:10 AM    VLDL, calculated 28.8 11/19/2017 04:10 AM    Triglyceride 144 11/19/2017 04:10 AM    CHOL/HDL Ratio 3.3 11/19/2017 04:10 AM       Discharge Exam:  Visit Vitals    BP (!) 150/94 (BP 1 Location: Right arm)    Pulse 80    Temp 97.6 °F (36.4 °C)    Resp 20    Ht 5' 9\" (1.753 m)    Wt 106.8 kg (235 lb 8 oz)    SpO2 98%    BMI 34.78 kg/m2     General appearance: alert, cooperative, no distress, appears stated age  Lungs: clear to auscultation bilaterally  Heart: regular rate and rhythm, S1, S2 normal, no murmur, click, rub or gallop  Extremities: extremities normal, atraumatic, no cyanosis or edema  Neurologic: Grossly normal    * Discharge Condition: improved and stable  * Disposition: Home    Discharge Medications:  Current Discharge Medication List      START taking these medications    Details   aspirin delayed-release 81 mg tablet Take 1 Tab by mouth daily for 30 days. Indications: Cerebral Thromboembolism Prevention  Qty: 30 Tab, Refills: 0      atorvastatin (LIPITOR) 40 mg tablet Take 1 Tab by mouth daily for 30 days. Indications: hyperlipidemia  Qty: 30 Tab, Refills: 0         CONTINUE these medications which have NOT CHANGED    Details   valsartan 320 mg tab 320 mg, hydroCHLOROthiazide 25 mg tab 25 mg Take 1 Dose by mouth daily. levothyroxine (SYNTHROID) 75 mcg tablet Take 75 mcg by mouth Daily (before breakfast). escitalopram oxalate (LEXAPRO) 10 mg tablet Take 10 mg by mouth daily. dextroamphetamine-amphetamine (ADDERALL) 20 mg tablet Take 20 mg by mouth daily. * Follow-up Care/Patient Instructions:   Activity: Activity as tolerated  Diet: Cardiac Diet  Wound Care: None needed    Follow-up Information     Follow up With Details Comments Contact Info    Kashif Ball MD Follow up in 1-2 weeks  [address] [facility] [place] [address] Rd  [phone]            Signed:  Angelica Brewster MD  11/19/2017  3:02 PM

## 2017-11-19 NOTE — PROGRESS NOTES
DISCHARGE SUMMARY from Nurse    PATIENT INSTRUCTIONS:    After general anesthesia or intravenous sedation, for 24 hours or while taking prescription Narcotics:  · Limit your activities  · Do not drive and operate hazardous machinery  · Do not make important personal or business decisions  · Do  not drink alcoholic beverages  · If you have not urinated within 8 hours after discharge, please contact your surgeon on call. Report the following to your surgeon:  · Excessive pain, swelling, redness or odor of or around the surgical area  · Temperature over 100.5  · Nausea and vomiting lasting longer than 4 hours or if unable to take medications  · Any signs of decreased circulation or nerve impairment to extremity: change in color, persistent  numbness, tingling, coldness or increase pain  · Any questions    What to do at Home:  Recommended activity: Activity as tolerated,     If you experience any of the following symptoms dizziness, numbness, please follow up with PCP or ER. *  Please give a list of your current medications to your Primary Care Provider. *  Please update this list whenever your medications are discontinued, doses are      changed, or new medications (including over-the-counter products) are added. *  Please carry medication information at all times in case of emergency situations. These are general instructions for a healthy lifestyle:    No smoking/ No tobacco products/ Avoid exposure to second hand smoke  Surgeon General's Warning:  Quitting smoking now greatly reduces serious risk to your health.     Obesity, smoking, and sedentary lifestyle greatly increases your risk for illness    A healthy diet, regular physical exercise & weight monitoring are important for maintaining a healthy lifestyle    You may be retaining fluid if you have a history of heart failure or if you experience any of the following symptoms:  Weight gain of 3 pounds or more overnight or 5 pounds in a week, increased swelling in our hands or feet or shortness of breath while lying flat in bed. Please call your doctor as soon as you notice any of these symptoms; do not wait until your next office visit. Recognize signs and symptoms of STROKE:    F-face looks uneven    A-arms unable to move or move unevenly    S-speech slurred or non-existent    T-time-call 911 as soon as signs and symptoms begin-DO NOT go       Back to bed or wait to see if you get better-TIME IS BRAIN. Warning Signs of HEART ATTACK     Call 911 if you have these symptoms:   Chest discomfort. Most heart attacks involve discomfort in the center of the chest that lasts more than a few minutes, or that goes away and comes back. It can feel like uncomfortable pressure, squeezing, fullness, or pain.  Discomfort in other areas of the upper body. Symptoms can include pain or discomfort in one or both arms, the back, neck, jaw, or stomach.  Shortness of breath with or without chest discomfort.  Other signs may include breaking out in a cold sweat, nausea, or lightheadedness. Don't wait more than five minutes to call [address]! Fast action can save your life. Calling 911 is almost always the fastest way to get lifesaving treatment. Emergency Medical Services staff can begin treatment when they arrive  up to an hour sooner than if someone gets to the hospital by car. The discharge information has been reviewed with the patient. The patient verbalized understanding. Discharge medications reviewed with the patient and appropriate educational materials and side effects teaching were provided.   ___________________________________________________________________________________________________________________________________

## 2017-11-19 NOTE — PROGRESS NOTES
Pt. Anxious after walking with physical therapy. Experienced increases in numbness in left arm and left face.

## 2017-11-19 NOTE — PROGRESS NOTES
No change in assessment. No change in neurological assessment. VSS. Afebrile. Awakens easily to verbal stimuli.

## 2017-11-19 NOTE — PROGRESS NOTES
Problem: TIA/CVA Stroke: 0-24 hours  Goal: *Verbalizes anxiety and depression are reduced or absent  Outcome: Progressing Towards Goal  Verbalizes anxiety is present.  notified and orders placed for Ativan po and IV prn.

## 2017-11-19 NOTE — PROGRESS NOTES
Alert. No complaints. Numbness still exists in left face and left outer arm and hand. Strong . PT working with pt.

## 2017-11-19 NOTE — PROGRESS NOTES
Speech language pathology: bedside swallow note: Initial Assessment and Discharge   OBSERVATION PATIENT    NAME/AGE/GENDER: J Carlos Babin is a 57 y.o. male  DATE: 11/19/2017  PRIMARY DIAGNOSIS: Acute CVA (cerebrovascular accident) [facility])       ICD-10: Treatment Diagnosis: Oropharyngeal dysphagia (R13.12)  INTERDISCIPLINARY COLLABORATION: Registered Nurse  PRECAUTIONS/ALLERGIES: Review of patient's allergies indicates no known allergies. ASSESSMENT:Based on the objective data described below, Mr. Milan presents with complaints of left buccal and left labial numbness that does not affect motor movement. Oral articulators are symmetrical and without loss of strength or speed. Swallows of all textures timely with no overt signs or symptoms of aspiration. Swallows were completed with adequate laryngeal excursion and clear voicing after swallow. Conversational speech was clear and appropriate. Recommend continue regular texture diet and thin liquids. There would be no added benefit to skilled speech/swallow intervention at this time, as deficits are all sensory in nature. PLAN OF CARE:   Patient will benefit from skilled intervention to address the following impairments. RECOMMENDATIONS AND PLANNED INTERVENTIONS (Benefits and precautions of therapy have been discussed with the patient.):  · continue prescribed diet  MEDICATIONS:  · With liquid  COMPENSATORY STRATEGIES/MODIFICATIONS INCLUDING:  · Upright for all PO  RECOMMENDED REHABILITATION/EQUIPMENT: (at time of discharge pending progress): Due to the probability of continued deficits (see above) this patient will not likely need continued skilled speech therapy after discharge. SUBJECTIVE:   \"I can talk. I can swallow. It just feels like I've been to the dentist and had a tooth pulled and can't feel my face yet. \" Patient denies any drooling and none observed throughout assessment.   History of Present Injury/Illness: Mr. Milan  has a past medical history of Chronic kidney disease; Hypertension; Stroke [facility]) (11/2017); and Thyroid disease. He also  has no past surgical history on file. Present Symptoms: CVA   Pain Intensity 1: 0  Current Medications:   No current facility-administered medications on file prior to encounter. No current outpatient prescriptions on file prior to encounter. Current Dietary Status:  Regular textures, thin liquids    Social History/Home Situation:    Home Environment: Apartment  # Steps to Enter: 39  Rails to Enter: Yes  Hand Rails : Bilateral (but wide)  Wheelchair Ramp: No  One/Two Story Residence: One story  # of Interior Steps: 0  Interior Rails: None  Living Alone: Yes  Support Systems: Child(anuradha), Family member(s)  Patient Expects to be Discharged to[de-identified] Private residence  Current DME Used/Available at Home:  (none)  Tub or Shower Type: Tub/Shower combination  OBJECTIVE:   Respiratory Status:  Room air     CXR Results:none  MRI/CT Results:CT negative; MRI pending    Cognitive and Communication Status:  Neurologic State: Alert  Orientation Level: Appropriate for age;Oriented X4  Cognition: Appropriate decision making; Appropriate for age attention/concentration; Follows commands  Perception: Appears intact  Perseveration: No perseveration noted  Safety/Judgement: Insight into deficits; Awareness of environment    BEDSIDE SWALLOW EVALUATION  Oral Assessment:  Oral Assessment  Labial: No impairment  Dentition: Natural  Oral Hygiene: adequate  Lingual: No impairment  P.O. Trials:  Patient Position: upright in bed    The patient was given the following:   Consistency Presented: Thin liquid; Solid;Puree;Mixed consistency  How Presented: Self-fed/presented;Cup/sip;Cup/gulp; Spoon;Straw;Successive swallows    ORAL PHASE:  Bolus Acceptance: No impairment  Bolus Formation/Control: No impairment  Propulsion: No impairment     Oral Residue: None    PHARYNGEAL PHASE:  Initiation of Swallow: No impairment  Laryngeal Elevation: Functional  Aspiration Signs/Symptoms: None  Vocal Quality: No impairment           Pharyngeal Phase Characteristics: No impairment, issues, or problems     OTHER OBSERVATIONS:  Rate/bite size: WNL   Endurance: WNL      Tool Used: Dysphagia Outcome and Severity Scale (TIBURCIO)    Score Comments   Normal Diet  [x] 7 With no strategies or extra time needed   Functional Swallow  [] 6 May have mild oral or pharyngeal delay       Mild Dysphagia    [] 5 Which may require one diet consistency restricted (those who demonstrate penetration which is entirely cleared on MBS would be included)   Mild-Moderate Dysphagia  [] 4 With 1-2 diet consistencies restricted       Moderate Dysphagia  [] 3 With 2 or more diet consistencies restricted       Moderately Severe Dysphagia  [] 2 With partial PO strategies (trials with ST only)       Severe Dysphagia  [] 1 With inability to tolerate any PO safely          Score:  Initial: 7 Most Recent: X (Date: -- )   Interpretation of Tool: The Dysphagia Outcome and Severity Scale (TIBURCIO) is a simple, easy-to-use, 7-point scale developed to systematically rate the functional severity of dysphagia based on objective assessment and make recommendations for diet level, independence level, and type of nutrition. Score 7 6 5 4 3 2 1   Modifier CH CI CJ CK CL CM CN   ?  Swallowing:     - CURRENT STATUS: CH - 0% impaired, limited or restricted    - GOAL STATUS:  CH - 0% impaired, limited or restricted    - D/C STATUS:  [address] [place] [address] - 0% impaired, limited or restricted  Payor: Lisa Lane / Plan: Roy Ormond PPO / Product Type: PPO /     TREATMENT:    (In addition to Assessment/Re-Assessment sessions the following treatments were rendered)  Assessment/Reassessment only, no treatment provided today  _______________________________________________________________________________________  Safety:   After treatment position/precautions:  · RN notified  · Upright in Bed  Treatment Assessment:  No further skilled speech/swallow intervention indicated at this time.     Total Treatment Duration:  Time In: 0915  Time Out: [id number] Jose Ochoa MA, CCC-SLP

## 2017-11-19 NOTE — PROGRESS NOTES
Information given to pt. On lipitor and why he needs to take it, dosage, and side effects discussed. Pt. Verbalized understanding.

## 2017-11-19 NOTE — PROGRESS NOTES
No change in assessment. VSS. Afebrile. No neurological changes observed with assessment. Watching TV, denies needs at this time.

## 2017-11-19 NOTE — PROGRESS NOTES
Pt.'s /94. Pt. To take his blood pressure med. At home. Also to check BP at home-has a machine. To make appt. With his PCP for 2 weeks, earlier if needed.

## 2017-11-21 ENCOUNTER — APPOINTMENT (OUTPATIENT)
Dept: CT IMAGING | Age: 62
End: 2017-11-21
Payer: COMMERCIAL

## 2017-11-21 ENCOUNTER — HOSPITAL ENCOUNTER (OUTPATIENT)
Age: 62
Setting detail: OBSERVATION
Discharge: HOME | End: 2017-11-21
Admitting: HOSPITALIST
Payer: COMMERCIAL

## 2017-11-21 VITALS
BODY MASS INDEX: 34.36 KG/M2 | RESPIRATION RATE: 18 BRPM | WEIGHT: 240 LBS | SYSTOLIC BLOOD PRESSURE: 143 MMHG | TEMPERATURE: 98 F | HEART RATE: 87 BPM | HEIGHT: 70 IN | DIASTOLIC BLOOD PRESSURE: 97 MMHG | OXYGEN SATURATION: 96 %

## 2017-11-21 DIAGNOSIS — I63.9 CEREBROVASCULAR ACCIDENT (CVA), UNSPECIFIED MECHANISM (HCC): Primary | ICD-10-CM

## 2017-11-21 PROBLEM — I63.81 THALAMIC INFARCT, ACUTE (HCC): Status: ACTIVE | Noted: 2017-11-21

## 2017-11-21 LAB
ALBUMIN SERPL-MCNC: 3.9 G/DL (ref 3.2–4.6)
ALBUMIN/GLOB SERPL: 1 {RATIO} (ref 1.2–3.5)
ALP SERPL-CCNC: 89 U/L (ref 50–136)
ALT SERPL-CCNC: 50 U/L (ref 12–65)
ANION GAP SERPL CALC-SCNC: 12 MMOL/L (ref 7–16)
APTT PPP: 24.4 SEC (ref 23.5–31.7)
AST SERPL-CCNC: 42 U/L (ref 15–37)
ATRIAL RATE: 86 BPM
BASOPHILS # BLD: 0 K/UL (ref 0–0.2)
BASOPHILS NFR BLD: 1 % (ref 0–2)
BILIRUB SERPL-MCNC: 0.3 MG/DL (ref 0.2–1.1)
BUN SERPL-MCNC: 20 MG/DL (ref 8–23)
CALCIUM SERPL-MCNC: 9 MG/DL (ref 8.3–10.4)
CALCULATED P AXIS, ECG09: 44 DEGREES
CALCULATED R AXIS, ECG10: -4 DEGREES
CALCULATED T AXIS, ECG11: 15 DEGREES
CHLORIDE SERPL-SCNC: 99 MMOL/L (ref 98–107)
CO2 SERPL-SCNC: 26 MMOL/L (ref 21–32)
CREAT SERPL-MCNC: 1.46 MG/DL (ref 0.8–1.5)
DIAGNOSIS, 93000: NORMAL
DIFFERENTIAL METHOD BLD: ABNORMAL
EOSINOPHIL # BLD: 0.2 K/UL (ref 0–0.8)
EOSINOPHIL NFR BLD: 5 % (ref 0.5–7.8)
ERYTHROCYTE [DISTWIDTH] IN BLOOD BY AUTOMATED COUNT: 12.7 % (ref 11.9–14.6)
GLOBULIN SER CALC-MCNC: 3.9 G/DL (ref 2.3–3.5)
GLUCOSE BLD STRIP.AUTO-MCNC: 163 MG/DL (ref 65–100)
GLUCOSE SERPL-MCNC: 168 MG/DL (ref 65–100)
HCT VFR BLD AUTO: 36.4 % (ref 41.1–50.3)
HGB BLD-MCNC: 11.9 G/DL (ref 13.6–17.2)
IMM GRANULOCYTES # BLD: 0 K/UL (ref 0–0.5)
IMM GRANULOCYTES NFR BLD AUTO: 0 % (ref 0–5)
INR PPP: 1 (ref 0.9–1.2)
LYMPHOCYTES # BLD: 1.4 K/UL (ref 0.5–4.6)
LYMPHOCYTES NFR BLD: 30 % (ref 13–44)
MCH RBC QN AUTO: 30.6 PG (ref 26.1–32.9)
MCHC RBC AUTO-ENTMCNC: 32.7 G/DL (ref 31.4–35)
MCV RBC AUTO: 93.6 FL (ref 79.6–97.8)
MONOCYTES # BLD: 0.7 K/UL (ref 0.1–1.3)
MONOCYTES NFR BLD: 14 % (ref 4–12)
NEUTS SEG # BLD: 2.4 K/UL (ref 1.7–8.2)
NEUTS SEG NFR BLD: 50 % (ref 43–78)
P-R INTERVAL, ECG05: 166 MS
PLATELET # BLD AUTO: 356 K/UL (ref 150–450)
PMV BLD AUTO: 10.3 FL (ref 10.8–14.1)
POTASSIUM SERPL-SCNC: 4.2 MMOL/L (ref 3.5–5.1)
PROT SERPL-MCNC: 7.8 G/DL (ref 6.3–8.2)
PROTHROMBIN TIME: 10.1 SEC (ref 9.6–12)
Q-T INTERVAL, ECG07: 356 MS
QRS DURATION, ECG06: 90 MS
QTC CALCULATION (BEZET), ECG08: 426 MS
RBC # BLD AUTO: 3.89 M/UL (ref 4.23–5.67)
SODIUM SERPL-SCNC: 137 MMOL/L (ref 136–145)
VENTRICULAR RATE, ECG03: 86 BPM
WBC # BLD AUTO: 4.8 K/UL (ref 4.3–11.1)

## 2017-11-21 PROCEDURE — 97165 OT EVAL LOW COMPLEX 30 MIN: CPT

## 2017-11-21 PROCEDURE — 97161 PT EVAL LOW COMPLEX 20 MIN: CPT

## 2017-11-21 PROCEDURE — 99218 HC RM OBSERVATION: CPT

## 2017-11-21 PROCEDURE — 96372 THER/PROPH/DIAG INJ SC/IM: CPT

## 2017-11-21 PROCEDURE — 85025 COMPLETE CBC W/AUTO DIFF WBC: CPT

## 2017-11-21 PROCEDURE — 82962 GLUCOSE BLOOD TEST: CPT

## 2017-11-21 PROCEDURE — 74011250636 HC RX REV CODE- 250/636: Performed by: HOSPITALIST

## 2017-11-21 PROCEDURE — 70450 CT HEAD/BRAIN W/O DYE: CPT

## 2017-11-21 PROCEDURE — 92610 EVALUATE SWALLOWING FUNCTION: CPT | Performed by: SPEECH-LANGUAGE PATHOLOGIST

## 2017-11-21 PROCEDURE — 97162 PT EVAL MOD COMPLEX 30 MIN: CPT

## 2017-11-21 PROCEDURE — 93005 ELECTROCARDIOGRAM TRACING: CPT

## 2017-11-21 PROCEDURE — 85610 PROTHROMBIN TIME: CPT

## 2017-11-21 PROCEDURE — 80053 COMPREHEN METABOLIC PANEL: CPT

## 2017-11-21 PROCEDURE — 99285 EMERGENCY DEPT VISIT HI MDM: CPT

## 2017-11-21 PROCEDURE — 85730 THROMBOPLASTIN TIME PARTIAL: CPT

## 2017-11-21 RX ORDER — SODIUM CHLORIDE 0.9 % (FLUSH) 0.9 %
5-10 SYRINGE (ML) INJECTION EVERY 8 HOURS
Status: DISCONTINUED | OUTPATIENT
Start: 2017-11-21 | End: 2017-11-21 | Stop reason: HOSPADM

## 2017-11-21 RX ORDER — ESCITALOPRAM OXALATE 10 MG/1
10 TABLET ORAL DAILY
Status: DISCONTINUED | OUTPATIENT
Start: 2017-11-21 | End: 2017-11-21 | Stop reason: HOSPADM

## 2017-11-21 RX ORDER — ENOXAPARIN SODIUM 100 MG/ML
40 INJECTION SUBCUTANEOUS EVERY 24 HOURS
Status: DISCONTINUED | OUTPATIENT
Start: 2017-11-21 | End: 2017-11-21 | Stop reason: HOSPADM

## 2017-11-21 RX ORDER — SODIUM CHLORIDE 0.9 % (FLUSH) 0.9 %
5-10 SYRINGE (ML) INJECTION AS NEEDED
Status: DISCONTINUED | OUTPATIENT
Start: 2017-11-21 | End: 2017-11-21 | Stop reason: HOSPADM

## 2017-11-21 RX ORDER — ATORVASTATIN CALCIUM 40 MG/1
40 TABLET, FILM COATED ORAL
Status: DISCONTINUED | OUTPATIENT
Start: 2017-11-21 | End: 2017-11-21 | Stop reason: HOSPADM

## 2017-11-21 RX ORDER — FOLIC ACID 1 MG/1
1 TABLET ORAL DAILY
Status: DISCONTINUED | OUTPATIENT
Start: 2017-11-21 | End: 2017-11-21 | Stop reason: HOSPADM

## 2017-11-21 RX ORDER — ZOLPIDEM TARTRATE 5 MG/1
5 TABLET ORAL
Qty: 10 TAB | Refills: 0 | Status: SHIPPED | OUTPATIENT
Start: 2017-11-21 | End: 2018-09-13

## 2017-11-21 RX ORDER — LEVOTHYROXINE SODIUM 50 UG/1
75 TABLET ORAL
Status: DISCONTINUED | OUTPATIENT
Start: 2017-11-21 | End: 2017-11-21 | Stop reason: HOSPADM

## 2017-11-21 RX ORDER — LANOLIN ALCOHOL/MO/W.PET/CERES
100 CREAM (GRAM) TOPICAL DAILY
Status: DISCONTINUED | OUTPATIENT
Start: 2017-11-21 | End: 2017-11-21 | Stop reason: HOSPADM

## 2017-11-21 RX ORDER — LABETALOL HYDROCHLORIDE 5 MG/ML
5 INJECTION, SOLUTION INTRAVENOUS
Status: DISCONTINUED | OUTPATIENT
Start: 2017-11-21 | End: 2017-11-21 | Stop reason: HOSPADM

## 2017-11-21 RX ORDER — DEXTROAMPHETAMINE SACCHARATE, AMPHETAMINE ASPARTATE, DEXTROAMPHETAMINE SULFATE AND AMPHETAMINE SULFATE 2.5; 2.5; 2.5; 2.5 MG/1; MG/1; MG/1; MG/1
20 TABLET ORAL DAILY
Status: DISCONTINUED | OUTPATIENT
Start: 2017-11-21 | End: 2017-11-21 | Stop reason: HOSPADM

## 2017-11-21 RX ORDER — ASPIRIN 81 MG/1
81 TABLET ORAL DAILY
Status: DISCONTINUED | OUTPATIENT
Start: 2017-11-21 | End: 2017-11-21 | Stop reason: HOSPADM

## 2017-11-21 RX ORDER — LORAZEPAM 1 MG/1
1 TABLET ORAL
Status: DISCONTINUED | OUTPATIENT
Start: 2017-11-21 | End: 2017-11-21 | Stop reason: HOSPADM

## 2017-11-21 RX ADMIN — Medication 10 ML: at 08:57

## 2017-11-21 RX ADMIN — Medication 10 ML: at 13:34

## 2017-11-21 RX ADMIN — ENOXAPARIN SODIUM 40 MG: 40 INJECTION SUBCUTANEOUS at 08:59

## 2017-11-21 NOTE — H&P
HOSPITALIST H&P/CONSULT           NAME:  Jailene Maguire   Age:  57 y.o.  :   1955   MRN:   [id number]  PCP: Clinton Celis MD  Consulting MD:  Treatment Team: Attending Provider: Cain Petersen MD; Utilization Review: Samreen Mae RN  HPI:   CC: left sided weakness    HPI: 58F with HTN and hypothyroidism presented with recent acute onset LUE and LLE weakness and numbness. Patient was recently admitted and work up for same complaint and was diagnosed with TIA. CT and CTA head were unremarkable. MRI had showed small vessel ischemic changes without acute infarct. LDL was elevated so patient was discharged on ASA and Statin. TTE was also unremarkable at that time with normal LVEF and no shunts. Patient presented to the ED today after he went to bathroom this morning, noted difference in his LLE compared to right when he ambulated and difficulty using his left hand to turn on light switch. He also thought the left side of his mouth has a slight droop. No speech impediment, no swallowing difficulty noted. Complete ROS done and is as stated in HPI or otherwise negative    Past Medical History:   Diagnosis Date    Chronic kidney disease     kidney stone    Hypertension     Stroke ([facility] [address] 2017    Left sided weakness/numbness, artherosclerotic thickening & calcified plaques    Thyroid disease       History reviewed. No pertinent surgical history. Prior to Admission Medications   Prescriptions Last Dose Informant Patient Reported? Taking?   aspirin delayed-release 81 mg tablet   No No   Sig: Take 1 Tab by mouth daily for 30 days. Indications: Cerebral Thromboembolism Prevention   atorvastatin (LIPITOR) 40 mg tablet   No No   Sig: Take 1 Tab by mouth daily for 30 days.  Indications: hyperlipidemia   dextroamphetamine-amphetamine (ADDERALL) 20 mg tablet   Yes No   Sig: Take 20 mg by mouth daily. escitalopram oxalate (LEXAPRO) 10 mg tablet   Yes No   Sig: Take 10 mg by mouth daily. levothyroxine (SYNTHROID) 75 mcg tablet   Yes No   Sig: Take 75 mcg by mouth Daily (before breakfast). valsartan 320 mg tab 320 mg, hydroCHLOROthiazide 25 mg tab 25 mg   Yes No   Sig: Take 1 Dose by mouth daily. Facility-Administered Medications: None     No Known Allergies     Social History   Substance Use Topics    Smoking status: Never Smoker    Smokeless tobacco: Current User     Types: Chew    Alcohol use Yes      Comment: nightly, 1-2 bottles        History reviewed. No pertinent family history. Objective:     Visit Vitals    /76    Pulse 81    Temp 98 °F (36.7 °C)    Resp 26    Ht 5' 10\" (1.778 m)    Wt 108.9 kg (240 lb)    SpO2 96%    BMI 34.44 kg/m2      Temp (24hrs), Av.8 °F (36.6 °C), Min:97.3 °F (36.3 °C), Max:98 °F (36.7 °C)    Oxygen Therapy  O2 Sat (%): 96 % (17)  Pulse via Oximetry: 82 beats per minute (17)  O2 Device: Room air (17)    Physical Exam:  General:    Alert, cooperative, no distress, appears stated age. Head:   Normocephalic, without obvious abnormality, atraumatic. Eyes:   Anicteric sclera, clear conjunctiva, EOMI  Mouth:  Moist oral mucosa  Nose:  Nares normal. No drainage or sinus tenderness. Lungs:   Clear to auscultation bilaterally. No Wheezing or Rhonchi. No rales. Heart:   Regular rate and rhythm,  no murmur, rub or gallop. Abdomen:   Soft, non-tender. Not distended. Bowel sounds normal.   Extremities: No cyanosis. No edema. No clubbing  Skin:     Texture, turgor normal. No rashes or lesions. Not Jaundiced  Neurologic: Alert and oriented x 3, slight weakness in LUE and LLE compared to right side, 4/5 ion left compared to 5/5 on right side, negligible left droop corner of mouth.   Gait intact, sensation intact  Psych:  Normal mood and affect, coherent    Data Review:   Recent Results (from the past 24 hour(s))   GLUCOSE, POC    Collection Time: 11/21/17  5:59 AM   Result Value Ref Range    Glucose (POC) 163 (H) 65 - 100 mg/dL   CBC WITH AUTOMATED DIFF    Collection Time: 11/21/17  6:09 AM   Result Value Ref Range    WBC 4.8 4.3 - 11.1 K/uL    RBC 3.89 (L) 4.23 - 5.67 M/uL    HGB 11.9 (L) 13.6 - 17.2 g/dL    HCT 36.4 (L) 41.1 - 50.3 %    MCV 93.6 79.6 - 97.8 FL    MCH 30.6 26.1 - 32.9 PG    MCHC 32.7 31.4 - 35.0 g/dL    RDW 12.7 11.9 - 14.6 %    PLATELET 593 424 - 570 K/uL    MPV 10.3 (L) 10.8 - 14.1 FL    DF AUTOMATED      NEUTROPHILS 50 43 - 78 %    LYMPHOCYTES 30 13 - 44 %    MONOCYTES 14 (H) 4.0 - 12.0 %    EOSINOPHILS 5 0.5 - 7.8 %    BASOPHILS 1 0.0 - 2.0 %    IMMATURE GRANULOCYTES 0 0.0 - 5.0 %    ABS. NEUTROPHILS 2.4 1.7 - 8.2 K/UL    ABS. LYMPHOCYTES 1.4 0.5 - 4.6 K/UL    ABS. MONOCYTES 0.7 0.1 - 1.3 K/UL    ABS. EOSINOPHILS 0.2 0.0 - 0.8 K/UL    ABS. BASOPHILS 0.0 0.0 - 0.2 K/UL    ABS. IMM. GRANS. 0.0 0.0 - 0.5 K/UL   METABOLIC PANEL, COMPREHENSIVE    Collection Time: 11/21/17  6:09 AM   Result Value Ref Range    Sodium 137 136 - 145 mmol/L    Potassium 4.2 3.5 - 5.1 mmol/L    Chloride 99 98 - 107 mmol/L    CO2 26 21 - 32 mmol/L    Anion gap 12 7 - 16 mmol/L    Glucose 168 (H) 65 - 100 mg/dL    BUN 20 8 - 23 MG/DL    Creatinine 1.46 0.8 - 1.5 MG/DL    GFR est AA >60 >60 ml/min/1.73m2    GFR est non-AA 52 (L) >60 ml/min/1.73m2    Calcium 9.0 8.3 - 10.4 MG/DL    Bilirubin, total 0.3 0.2 - 1.1 MG/DL    ALT (SGPT) 50 12 - 65 U/L    AST (SGOT) 42 (H) 15 - 37 U/L    Alk.  phosphatase 89 50 - 136 U/L    Protein, total 7.8 6.3 - 8.2 g/dL    Albumin 3.9 3.2 - 4.6 g/dL    Globulin 3.9 (H) 2.3 - 3.5 g/dL    A-G Ratio 1.0 (L) 1.2 - 3.5     PROTHROMBIN TIME + INR    Collection Time: 11/21/17  6:09 AM   Result Value Ref Range    Prothrombin time 10.1 9.6 - 12.0 sec    INR 1.0 0.9 - 1.2     PTT    Collection Time: 11/21/17  6:09 AM   Result Value Ref Range    aPTT 24.4 23.5 - 31.7 SEC   EKG, 12 LEAD, INITIAL    Collection Time: 11/21/17  6:09 AM   Result Value Ref Range    Ventricular Rate 86 BPM    Atrial Rate 86 BPM    P-R Interval 166 ms    QRS Duration 90 ms    Q-T Interval 356 ms    QTC Calculation (Bezet) 426 ms    Calculated P Axis 44 degrees    Calculated R Axis -4 degrees    Calculated T Axis 15 degrees    Diagnosis       !! AGE AND GENDER SPECIFIC ECG ANALYSIS !! Normal sinus rhythm  Normal ECG  When compared with ECG of 18-NOV-2017 10:49,  No significant change was found  Confirmed by BILL PERALES (), PRASANNA ZIMMERMAN ([id number]) on 11/21/2017 8:08:38 AM       Lab Results   Component Value Date/Time    Cholesterol, total 233 11/19/2017 04:10 AM    HDL Cholesterol 71 11/19/2017 04:10 AM    LDL, calculated 133.2 11/19/2017 04:10 AM    VLDL, calculated 28.8 11/19/2017 04:10 AM    Triglyceride 144 11/19/2017 04:10 AM    CHOL/HDL Ratio 3.3 11/19/2017 04:10 AM     11/19/17 A1c 6.3      Imaging /Procedures /Studies     Ct Head Wo Cont    Result Date: 11/21/2017  Impression: Acute right thalamic infarction. No acute intracranial hemorrhage. Findings were discussed by phone with Dr. Portillo Boroke in the emergency department at 1602 AM.        Assessment and Plan: Active Hospital Problems    Diagnosis Date Noted    Thalamic infarct, acute ([address] 11/21/2017    Hyperlipidemia 11/19/2017    Hypertension 11/18/2017    ADD (attention deficit disorder) 11/18/2017       PLAN  Place patient on observation. Deferring Doppler, TTE and further brain imaging since they were done ~2 days ago. Patient already on ASA and Statin that were also started 4 days ago. Will have SLP.  PT and OT re-evaluate him  Tobacco and Alcohol cessation reiterated  Cardiac diet  Good blood pressure control  Cont other home meds    Code Status: Full code  DVT prophylaxis: Lovenox  Anticipated discharge: Later today or in am    Signed By: Clay Oliver MD     November 21, 2017

## 2017-11-21 NOTE — PROGRESS NOTES
Patient states he received a stroke education book on prior admission a few days ago and states he still has book at home. Patient has no further questions.

## 2017-11-21 NOTE — PROGRESS NOTES
Initial visit by  to convey care and concern and encourage patient that  services are available if desired. Offered spiritual interventions during the visit. Mr. Vikram Amaya was anticipating hospital discharge today.       Pauly Dukes  Board Certified

## 2017-11-21 NOTE — DISCHARGE INSTRUCTIONS
Learning About Antiplatelet Medicines After a Stroke  Introduction    If you have had a stroke, you may have concerns about having another one. You want to do all you can do to avoid this. If your stroke was caused by a blood clot, one of the best things you can do is to take antiplatelet medicines. They can help prevent another stroke. In most cases, you don't take them if you had a stroke caused by a leak in an artery. These medicines are often called blood thinners. But they don't thin your blood. They work to keep platelets from sticking together and forming blood clots. (A platelet is a type of blood cell.) Blood clots can cause a stroke if they block a blood vessel in the brain. So by preventing blood clots, you are helping to prevent a stroke. Examples  · Aspirin (Gretel, Bufferin, Ecotrin)  · Aspirin with dipyridamole (Aggrenox)  · Clopidogrel (Plavix)  Possible side effects  These medicines make your blood take longer than normal to clot. This can cause bleeding, and you may bruise easily. In rare cases, they can cause you to bleed inside your body without an injury. If you have an injury, you might have bleeding that is hard to control. These medicines may have other side effects. Depending on which one you take, you may:  · Have diarrhea. · Feel sick to your stomach. · Have a headache. · Have some mild belly pain. You may have other side effects or reactions not listed here. Check the information that comes with your medicine. What to know about taking this medicine  · Be sure you get instructions about how to take your medicine safely. Blood thinners can cause serious bleeding problems. · Be safe with medicines. Take your medicines exactly as prescribed. Call your doctor if you think you are having a problem with your medicine. · Check with your doctor or pharmacist before you use any other medicines, including over-the-counter medicines.  Make sure your doctor knows all of the medicines, vitamins, herbal products, and supplements you take. Taking some medicines together can cause problems. Where can you learn more? Go to http://alivia-karen.info/. Enter D349 in the search box to learn more about \"Learning About Antiplatelet Medicines After a Stroke. \"  Current as of: September 21, 2016  Content Version: 11.4  © 0877-8093 [employer]. Care instructions adapted under license by [employer] (which disclaims liability or warranty for this information). If you have questions about a medical condition or this instruction, always ask your healthcare professional. [place] [address] any warranty or liability for your use of this information. Stroke: After Your Visit     Your Care Instructions     You have had a stroke. Risk factors for stroke include being overweight, smoking, and sedentary lifestyle. This means that the blood flow to a part of your brain was blocked for some time, which damages the nerve cells in that part of the brain. The part of your body controlled by that part of your brain may not function properly now. The brain is an amazing organ that can heal itself to some degree. The stroke you had damaged part of your brain, but other parts of your brain may take over in some way for the damaged areas. You have already started this process. Going home may be hard for you and your family. The more you can try to do for yourself, the better. Remember to take each day one at a time. Follow-up care is a key part of your treatment and safety. Be sure to make and go to all appointments, and call your doctor if you are having problems. Its also a good idea to know your test results and keep a list of the medicines you take. How can you care for yourself at home? Enter a stroke rehabilitation (rehab) program, if your doctor recommends it.  Physical, speech, and occupational therapies can help you manage bathing, dressing, eating, and other basics of daily living. Eat a heart-healthy diet that is low in cholesterol, saturated fat, and salt. Eat lots of fresh fruits and vegetables and foods high in fiber. Increase your activities slowly. Take short rest breaks when you get tired. Gradually increase the amount you walk. Start out by walking a little more than you did the day before. Do not drive until your doctor says it is okay. It is normal to feel sad or depressed after a stroke. If the blues last, talk to your doctor. If you are having problems with urine leakage, go to the bathroom at regular times, including when you first wake up and at bedtime. Also, limit fluids after dinner. If you are constipated, drink plenty of fluids, enough so that your urine is light yellow or clear like water. If you have kidney, heart, or liver disease and have to limit fluids, talk with your doctor before you increase the amount of fluids you drink. Set up a regular time for using the toilet. If you continue to have constipation, your doctor may suggest using a bulking agent, such as Metamucil, or a stool softener, laxative, or enema. Medicines  Take your medicines exactly as prescribed. Call your doctor if you think you are having a problem with your medicine. You may be taking several medicines. ACE (angiotensin-converting enzyme) inhibitors, angiotensin II receptor blockers (ARBs), beta-blockers, diuretics (water pills), and calcium channel blockers control your blood pressure. Statins help lower cholesterol. Your doctor may also prescribe medicines for depression, pain, sleep problems, anxiety, or agitation. If your doctor has given you medicine that prevents blood clots, such as warfarin (Coumadin), aspirin combined with extended-release dipyridamole (Aggrenox), clopidogrel (Plavix), or aspirin to prevent another stroke, you should:  Tell your dentist, pharmacist, and other health professionals that you take these medicines.   Watch for unusual bruising or bleeding, such as blood in your urine, red or black stools, or bleeding from your nose or gums. Get regular blood tests to check your clotting time if you are taking Coumadin. Wear medical alert jewelry that says you take blood thinners. You can buy this at most drugstores. Do not take any over-the-counter medicines or herbal products without talking to your doctor first.  If you take birth control pills or hormone replacement therapy, talk to your doctor about whether they are right for you. For family members and caregivers  Make the home safe. Set up a room so that your loved one does not have to climb stairs. Be sure the bathroom is on the same floor. Move throw rugs and furniture that could cause falls, and make sure that the lighting is good. Put grab bars and seats in tubs and showers. Find out what your loved one can do and what he or she needs help with. Try not to do things for your loved one that your loved one can do on his or her own. Help him or her learn and practice new skills. Visit and talk with your loved one often. Try doing activities together that you both enjoy, such as playing cards or board games. Keep in touch with your loved one's friends as much as you can, and encourage them to visit. Take care of yourself. Do not try to do everything yourself. Ask other family members to help. Eat well, get enough rest, and take time to do things that you enjoy. Keep up with your own doctor visits, and make sure to take your medicines regularly. Get out of the house as much as you can. Join a local support group. Find out if you qualify for home health care visits to help with rehab or for adult day care. When should you call for help? Call 911 anytime you think you may need emergency care. For example, call if:  You have signs of another stroke.  These may include:  Sudden numbness, paralysis, or weakness in your face, arm, or leg, especially on only one side of your body. New problems with walking or balance. Sudden vision changes. Drooling or slurred speech. New problems speaking or understanding simple statements, or you feel confused. A sudden, severe headache that is different from past headaches. Call 911 even if these symptoms go away in a few minutes. You cough up blood. You vomit blood or what looks like coffee grounds. You pass maroon or very bloody stools. Call your doctor now or seek immediate medical care if:  You have new bruises or blood spots under your skin. You have a nosebleed. Your gums bleed when you brush your teeth. You have blood in your urine. Your stools are black and tarlike or have streaks of blood. You have vaginal bleeding when you are not having your period, or heavy period bleeding. You have new symptoms that may be related to your stroke, such as falls or trouble swallowing. Watch closely for changes in your health, and be sure to contact your doctor if you have any problems. Where can you learn more? Go to [employer].be    Enter C294  in the search box to learn more about \"Stroke: After Your Visit\". © 4636-1002 [employer]. Care instructions adapted under license by [employer] (which disclaims liability or warranty for this information). This care instruction is for use with your licensed healthcare professional. If you have questions about a medical condition or this instruction, always ask your healthcare professional. Jennifer Fee any warranty or liability for your use of this information.

## 2017-11-21 NOTE — PROGRESS NOTES
I have reviewed discharge instructions with the patient. The patient verbalized understanding. Patient given prescription for Ambien and given instructions on use. Patient has no further questions and being driven home by brother and taken to vehicle via wheelchair. Patient has no further questions or concerns at this time.

## 2017-11-21 NOTE — ED TRIAGE NOTES
Pt states that he was seen last week for a TIA. Returns this AM with increased pain and numbness on the left side of his body. Slight facial droop noted and left hand appears to be weaker than the right. States that he has had problems picking up his left leg.   No problems with speech or cognition

## 2017-11-21 NOTE — ROUTINE PROCESS
TRANSFER - OUT REPORT:    Verbal report given to Bob Correa RN(name) on Tanya Olmstead  being transferred to Telemetry room #374(unit) for routine progression of care       Report consisted of patients Situation, Background, Assessment and   Recommendations(SBAR). Information from the following report(s) ED Summary was reviewed with the receiving nurse. Lines:   Peripheral IV 11/21/17 Right Antecubital (Active)   Site Assessment Clean, dry, & intact 11/21/2017  6:09 AM   Phlebitis Assessment 0 11/21/2017  6:09 AM   Infiltration Assessment 0 11/21/2017  6:09 AM   Dressing Status Clean, dry, & intact 11/21/2017  6:09 AM        Opportunity for questions and clarification was provided.       Patient transported with:   Registered Nurse

## 2017-11-21 NOTE — PROGRESS NOTES
PT/OT note: pt seen in ICU room. Pt was noted to have decreased sensation, coordination and slight increased tone in left upper and lower extremity. While the the pt is independent with most task with increased effort he will benefit greatly from PT and OT outpatient therapy. Full evaluations to follow.    Saulo Harp, OT   Jaida Lacey, PT

## 2017-11-21 NOTE — IP AVS SNAPSHOT
[address] 
[phone] Patient: Devon Eckert MRN: [id number] IFD:[id number] About your hospitalization You were admitted on:  November 21, 2017 You last received care in the:  [facility] 3 ICU You were discharged on:  November 21, 2017 Why you were hospitalized Your primary diagnosis was: Thalamic Infarct, Acute (Hcc) Your diagnoses also included:  Hypertension, Hyperlipidemia, Add (Attention Deficit Disorder) Things You Need To Do (next 8 weeks) Follow up with Toro James MD  
  
Phone:  [phone] Where:  [facility], [address] [address] Discharge Orders Procedure Order Date Status Priority Quantity Spec Type Associated Dx REFERRAL TO PHYSICAL THERAPY 11/21/17 1324 Normal Routine 1 Comments:  Dx: Right thalamic stroke REFERRAL TO OCCUPATIONAL THERAPY 11/21/17 1324 Normal Routine 1 Comments:  Dx: Right thalamic stroke A check lynette indicates which time of day the medication should be taken. My Medications TAKE these medications as instructed Instructions Each Dose to Equal  
 Morning Noon Evening Bedtime  
 aspirin delayed-release 81 mg tablet Your last dose was: Your next dose is: Take 1 Tab by mouth daily for 30 days. Indications: Cerebral Thromboembolism Prevention 81 mg  
    
   
   
   
  
 atorvastatin 40 mg tablet Commonly known as:  LIPITOR Your last dose was: Your next dose is: Take 1 Tab by mouth daily for 30 days. Indications: hyperlipidemia 40 mg  
    
   
   
   
  
 dextroamphetamine-amphetamine 20 mg tablet Commonly known as:  ADDERALL Your last dose was: Your next dose is: Take 20 mg by mouth daily. 20 mg  
    
   
   
   
  
 levothyroxine 75 mcg tablet Commonly known as:  SYNTHROID Your last dose was: Your next dose is: Take 75 mcg by mouth Daily (before breakfast). 75 mcg LEXAPRO 10 mg tablet Generic drug:  escitalopram oxalate Your last dose was: Your next dose is: Take 10 mg by mouth daily. 10 mg  
    
   
   
   
  
 valsartan 320 mg tab 320 mg, hydroCHLOROthiazide 25 mg tab 25 mg Your last dose was: Your next dose is: Take 1 Dose by mouth daily. 1 Dose  
    
   
   
   
  
 zolpidem 5 mg tablet Commonly known as:  AMBIEN Your last dose was: Your next dose is: Take 1 Tab by mouth nightly as needed for Sleep. Max Daily Amount: 5 mg.  
 5 mg Where to Get Your Medications Information on where to get these meds will be given to you by the nurse or doctor. ! Ask your nurse or doctor about these medications  
  zolpidem 5 mg tablet Discharge Instructions Learning About Antiplatelet Medicines After a Stroke Introduction If you have had a stroke, you may have concerns about having another one. You want to do all you can do to avoid this. If your stroke was caused by a blood clot, one of the best things you can do is to take antiplatelet medicines. They can help prevent another stroke. In most cases, you don't take them if you had a stroke caused by a leak in an artery. These medicines are often called blood thinners. But they don't thin your blood. They work to keep platelets from sticking together and forming blood clots. (A platelet is a type of blood cell.) Blood clots can cause a stroke if they block a blood vessel in the brain. So by preventing blood clots, you are helping to prevent a stroke. Examples · Aspirin (Gretel, Bufferin, Ecotrin) · Aspirin with dipyridamole (Aggrenox) · Clopidogrel (Plavix) Possible side effects These medicines make your blood take longer than normal to clot. This can cause bleeding, and you may bruise easily. In rare cases, they can cause you to bleed inside your body without an injury. If you have an injury, you might have bleeding that is hard to control. These medicines may have other side effects. Depending on which one you take, you may: 
· Have diarrhea. · Feel sick to your stomach. · Have a headache. · Have some mild belly pain. You may have other side effects or reactions not listed here. Check the information that comes with your medicine. What to know about taking this medicine · Be sure you get instructions about how to take your medicine safely. Blood thinners can cause serious bleeding problems. · Be safe with medicines. Take your medicines exactly as prescribed. Call your doctor if you think you are having a problem with your medicine. · Check with your doctor or pharmacist before you use any other medicines, including over-the-counter medicines. Make sure your doctor knows all of the medicines, vitamins, herbal products, and supplements you take. Taking some medicines together can cause problems. Where can you learn more? Go to http://alivia-karen.info/. Enter Z711 in the search box to learn more about \"Learning About Antiplatelet Medicines After a Stroke. \" Current as of: September 21, 2016 Content Version: 11.4 © 4040-1951 Yo. Care instructions adapted under license by [employer] (which disclaims liability or warranty for this information). If you have questions about a medical condition or this instruction, always ask your healthcare professional. [place] [address] any warranty or liability for your use of this information. Stroke: After Your Visit Your Care Instructions You have had a stroke.   Risk factors for stroke include being overweight, smoking, and sedentary lifestyle. This means that the blood flow to a part of your brain was blocked for some time, which damages the nerve cells in that part of the brain. The part of your body controlled by that part of your brain may not function properly now. The brain is an amazing organ that can heal itself to some degree. The stroke you had damaged part of your brain, but other parts of your brain may take over in some way for the damaged areas. You have already started this process. Going home may be hard for you and your family. The more you can try to do for yourself, the better. Remember to take each day one at a time. Follow-up care is a key part of your treatment and safety. Be sure to make and go to all appointments, and call your doctor if you are having problems. Its also a good idea to know your test results and keep a list of the medicines you take. How can you care for yourself at home? Enter a stroke rehabilitation (rehab) program, if your doctor recommends it. Physical, speech, and occupational therapies can help you manage bathing, dressing, eating, and other basics of daily living. Eat a heart-healthy diet that is low in cholesterol, saturated fat, and salt. Eat lots of fresh fruits and vegetables and foods high in fiber. Increase your activities slowly. Take short rest breaks when you get tired. Gradually increase the amount you walk. Start out by walking a little more than you did the day before. Do not drive until your doctor says it is okay. It is normal to feel sad or depressed after a stroke. If the blues last, talk to your doctor. If you are having problems with urine leakage, go to the bathroom at regular times, including when you first wake up and at bedtime. Also, limit fluids after dinner. If you are constipated, drink plenty of fluids, enough so that your urine is light yellow or clear like water.  If you have kidney, heart, or liver disease and have to limit fluids, talk with your doctor before you increase the amount of fluids you drink. Set up a regular time for using the toilet. If you continue to have constipation, your doctor may suggest using a bulking agent, such as Metamucil, or a stool softener, laxative, or enema. Medicines Take your medicines exactly as prescribed. Call your doctor if you think you are having a problem with your medicine. You may be taking several medicines. ACE (angiotensin-converting enzyme) inhibitors, angiotensin II receptor blockers (ARBs), beta-blockers, diuretics (water pills), and calcium channel blockers control your blood pressure. Statins help lower cholesterol. Your doctor may also prescribe medicines for depression, pain, sleep problems, anxiety, or agitation. If your doctor has given you medicine that prevents blood clots, such as warfarin (Coumadin), aspirin combined with extended-release dipyridamole (Aggrenox), clopidogrel (Plavix), or aspirin to prevent another stroke, you should: 
Tell your dentist, pharmacist, and other health professionals that you take these medicines. Watch for unusual bruising or bleeding, such as blood in your urine, red or black stools, or bleeding from your nose or gums. Get regular blood tests to check your clotting time if you are taking Coumadin. Wear medical alert jewelry that says you take blood thinners. You can buy this at most drugstores. Do not take any over-the-counter medicines or herbal products without talking to your doctor first. 
If you take birth control pills or hormone replacement therapy, talk to your doctor about whether they are right for you. For family members and caregivers Make the home safe. Set up a room so that your loved one does not have to climb stairs. Be sure the bathroom is on the same floor.  Move throw rugs and furniture that could cause falls, and make sure that the lighting is good. Put grab bars and seats in tubs and showers. Find out what your loved one can do and what he or she needs help with. Try not to do things for your loved one that your loved one can do on his or her own. Help him or her learn and practice new skills. Visit and talk with your loved one often. Try doing activities together that you both enjoy, such as playing cards or board games. Keep in touch with your loved one's friends as much as you can, and encourage them to visit. Take care of yourself. Do not try to do everything yourself. Ask other family members to help. Eat well, get enough rest, and take time to do things that you enjoy. Keep up with your own doctor visits, and make sure to take your medicines regularly. Get out of the house as much as you can. Join a local support group. Find out if you qualify for home health care visits to help with rehab or for adult day care. When should you call for help? Call 911 anytime you think you may need emergency care. For example, call if: 
You have signs of another stroke. These may include: 
Sudden numbness, paralysis, or weakness in your face, arm, or leg, especially on only one side of your body. New problems with walking or balance. Sudden vision changes. Drooling or slurred speech. New problems speaking or understanding simple statements, or you feel confused. A sudden, severe headache that is different from past headaches. Call 911 even if these symptoms go away in a few minutes. You cough up blood. You vomit blood or what looks like coffee grounds. You pass maroon or very bloody stools. Call your doctor now or seek immediate medical care if: 
You have new bruises or blood spots under your skin. You have a nosebleed. Your gums bleed when you brush your teeth. You have blood in your urine. Your stools are black and tarlike or have streaks of blood. You have vaginal bleeding when you are not having your period, or heavy period bleeding. You have new symptoms that may be related to your stroke, such as falls or trouble swallowing. Watch closely for changes in your health, and be sure to contact your doctor if you have any problems. Where can you learn more? Go to Lumidigm.be Enter F132  in the search box to learn more about \"Stroke: After Your Visit\". © 1547-3099 Healthwise, Incorporated. Care instructions adapted under license by [facility] [employer] (which disclaims liability or warranty for this information). This care instruction is for use with your licensed healthcare professional. If you have questions about a medical condition or this instruction, always ask your healthcare professional. [place] any warranty or liability for your use of this information. Acusphere Announcement We are excited to announce that we are making your provider's discharge notes available to you in Acusphere. You will see these notes when they are completed and signed by the physician that discharged you from your recent hospital stay. If you have any questions or concerns about any information you see in Acusphere, please call the Health Information Department where you were seen or reach out to your Primary Care Provider for more information about your plan of care. Introducing [facility]! [facility] introduces Acusphere patient portal. Now you can access parts of your medical record, email your doctor's office, and request medication refills online. 1. In your internet browser, go to [email or web address] 2. Click on the First Time User? Click Here link in the Sign In box. You will see the New Member Sign Up page. 3. Enter your Acusphere Access Code exactly as it appears below. You will not need to use this code after youve completed the sign-up process. If you do not sign up before the expiration date, you must request a new code. · AppUpper [employer] ASO Access Code: [email or web address] Expires: 2/16/2018 10:30 AM 
 
4. Enter the last four digits of your Social Security Number (xxxx) and Date of Birth (mm/dd/yyyy) as indicated and click Submit. You will be taken to the next sign-up page. 5. Create a AppUpper - ASO ID. This will be your App[employer] ASO login ID and cannot be changed, so think of one that is secure and easy to remember. 6. Create a App[employer] ASO password. You can change your password at any time. 7. Enter your Password Reset Question and Answer. This can be used at a later time if you forget your password. 8. Enter your e-mail address. You will receive e-mail notification when new information is available in [address] 9. Click Sign Up. You can now view and download portions of your medical record. 10. Click the Download Summary menu link to download a portable copy of your medical information. If you have questions, please visit the Frequently Asked Questions section of the AppUpper [employer]O website. Remember, AppUpper [employer]O is NOT to be used for urgent needs. For medical emergencies, dial 911. Now available from your iPhone and Android! Providers Seen During Your Hospitalization Provider Specialty Primary office phone Padilla Nevarez MD Emergency Medicine [phone] Margot Novoa MD Internal Medicine [phone] Your Primary Care Physician (PCP) Primary Care Physician Office Phone Office Fax Ellie Lakhani [phone] You are allergic to the following No active allergies Recent Documentation Height Weight BMI Smoking Status 1.778 m 108.9 kg 34.44 kg/m2 Never Smoker Emergency Contacts Name Discharge Info Relation Home Work Mobile Dexter Bravo  Brother [24] [phone] Yessica Page  Daughter [21] [phone] Patient Belongings The following personal items are in your possession at time of discharge: Dental Appliances: None  Visual Aid: None      Home Medications: None   Jewelry: None  Clothing: Pants, Footwear, Socks, With patient    Other Valuables: Eyeglasses, Personal electronic devices (comment) Please provide this summary of care documentation to your next provider. Signatures-by signing, you are acknowledging that this After Visit Summary has been reviewed with you and you have received a copy. Patient Signature:  ____________________________________________________________ Date:  ____________________________________________________________  
  
Yeyo Jones Provider Signature:  ____________________________________________________________ Date:  ____________________________________________________________

## 2017-11-21 NOTE — PROGRESS NOTES
Problem: Self Care Deficits Care Plan (Adult)  Goal: *Acute Goals and Plan of Care (Insert Text)    OCCUPATIONAL THERAPY: Initial Assessment and Discharge 11/21/2017  OBSERVATION: Hospital Day: 1  Payor: Reina Life / Plan: Marleen Leonorsoraida PPO / Product Type: PPO /      NAME/AGE/GENDER: Tanya Olmstead is a 57 y.o. male   PRIMARY DIAGNOSIS:  Thalamic infarct, acute ([address] Thalamic infarct, acute ([address] Thalamic infarct, acute ([address]        ICD-10: Treatment Diagnosis:    · Other lack of cordination (R27.8)   Precautions/Allergies:     Review of patient's allergies indicates no known allergies. ASSESSMENT:     Mr. Jacklyn Caban presents with above diagnosis and was seen in ICU with PT present. Pt was noted to be up in room independent and able to attend to needs. However, pt with decreased coordination and sensation in left upper extremity. Pt is very self aware and also describes a \"drawing\" of his left arm with a shocking sensation to the muscle. This therapist wonders if he is describing some increase tone. Pt is independent with self care but with great effort to overcome decreased coordination and by making accomodation to  normal activity, therefore, pt would benefit from outpatient OT. Pt has been discharge from the hospital so no OT goals set. This section established at most recent assessment   PROBLEM LIST (Impairments causing functional limitations):  1. Decreased Activity Tolerance  2. decreased coordination    INTERVENTIONS PLANNED: (Benefits and precautions of occupational therapy have been discussed with the patient.)  1. Neuromuscular re-eduation  2. Therapeutic activity  3.  Therapeutic exercise     TREATMENT PLAN: Frequency/Duration: pt has already been discharged   Rehabilitation Potential For Stated Goals: Good     RECOMMENDED REHABILITATION/EQUIPMENT: (at time of discharge pending progress): Due to the probability of continued deficits (see above) this patient will likely need continued skilled occupational therapy after discharge. Equipment:    None at this time              OCCUPATIONAL PROFILE AND HISTORY:   History of Present Injury/Illness (Reason for Referral):  CVA  Past Medical History/Comorbidities:   Mr. Shabnam Martinez  has a past medical history of Chronic kidney disease; Hypertension; Stroke ([facility] [address] (11/2017); and Thyroid disease. Mr. Shabnam Martinez  has no past surgical history on file.   Social History/Living Environment:   Home Environment: Apartment  # Steps to Enter:  (about 3 flights of stairs)  One/Two Story Residence: One story  Height of Each Step (in): 6 inches  Living Alone: Yes  Support Systems: Child(anuradha)  Patient Expects to be Discharged to[de-identified] Apartment  Current DME Used/Available at Home: None  Prior Level of Function/Work/Activity:  Independent      Number of Personal Factors/Comorbidities that affect the Plan of Care: Brief history (0):  LOW COMPLEXITY   ASSESSMENT OF OCCUPATIONAL PERFORMANCE[de-identified]   Activities of Daily Living:           Basic ADLs (From Assessment) Complex ADLs (From Assessment)   Basic ADL  Feeding: Independent  Oral Facial Hygiene/Grooming: Independent  Bathing: Independent  Upper Body Dressing: Independent  Lower Body Dressing: Independent  Toileting: Independent     Grooming/Bathing/Dressing Activities of Daily Living     Cognitive Retraining  Safety/Judgement: Awareness of environment                 Functional Transfers  Toilet Transfer : Independent  Shower Transfer: Independent     Bed/Mat Mobility  Supine to Sit: Independent  Sit to Supine: Independent  Sit to Stand: Independent  Bed to Chair: Independent       Most Recent Physical Functioning:   Gross Assessment:                  Posture:  Posture (WDL): Within defined limits  Balance:  Sitting: Intact  Standing: Intact Bed Mobility:  Supine to Sit: Independent  Sit to Supine: Independent  Wheelchair Mobility:     Transfers:  Sit to Stand: Independent  Stand to Sit: Independent  Bed to Chair: Independent Patient Vitals for the past 6 hrs:   BP SpO2 Pulse   17 1005 136/81 97 % 97   17 1035 107/69 93 % 89   17 1052 107/69 95 % 95   17 1105 114/60 98 % 90   17 1155 141/84 98 % 97   17 1336 (!) 143/97 96 % 87       Mental Status  Neurologic State: Alert  Orientation Level: Oriented X4  Cognition: Appropriate decision making  Perception: Appears intact  Perseveration: No perseveration noted  Safety/Judgement: Awareness of environment                          Physical Skills Involved:  1. Activity Tolerance  2. Sensation  3. Fine Motor Control  4. Gross Motor Control Cognitive Skills Affected (resulting in the inability to perform in a timely and safe manner): 1. none Psychosocial Skills Affected:  1. none   Number of elements that affect the Plan of Care: 3-5:  MODERATE COMPLEXITY   CLINICAL DECISION MAKIN[address] [place] [id number] AM-PAC 6 Clicks   Daily Activity Inpatient Short Form  How much help from another person does the patient currently need. .. Total A Lot A Little None   1. Putting on and taking off regular lower body clothing? [] 1   [] 2   [] 3   [x] 4   2. Bathing (including washing, rinsing, drying)? [] 1   [] 2   [] 3   [x] 4   3. Toileting, which includes using toilet, bedpan or urinal?   [] 1   [] 2   [] 3   [x] 4   4. Putting on and taking off regular upper body clothing? [] 1   [] 2   [] 3   [x] 4   5. Taking care of personal grooming such as brushing teeth? [] 1   [] 2   [] 3   [x] 4   6. Eating meals? [] 1   [] 2   [] 3   [x] 4   © , Trustees of [address] [place] [address], under license to [employer]. All rights reserved      Score:  Initial: 24 Most Recent: X (Date: -- )    Interpretation of Tool:  Represents activities that are increasingly more difficult (i.e. Bed mobility, Transfers, Gait). Score 24 23 22-20 19-15 14-10 9-7 6     Modifier CH CI CJ CK CL CM CN      ?  Self Care:     - CURRENT STATUS:  - 0% impaired, limited or restricted    - GOAL STATUS:  - 0% impaired, limited or restricted    - D/C STATUS:  [address] - 0% impaired, limited or restricted  Payor: Nika Rainey / Plan: Kenzie Mcnair PPO / Product Type: PPO /      Medical Necessity:     · Patient is expected to demonstrate progress in balance and coordination to self care . Reason for Services/Other Comments:  · Patient  would benefit from outpatient OT. Candance Huger    Use of outcome tool(s) and clinical judgement create a POC that gives a: LOW COMPLEXITY         TREATMENT:   (In addition to Assessment/Re-Assessment sessions the following treatments were rendered)     Pre-treatment Symptoms/Complaints:  Pt without complaint of pain  Pain: Initial:   Pain Intensity 1: 0 0 Post Session:  0     Assessment/Reassessment only, no treatment provided today    Braces/Orthotics/Lines/Etc:   · ICU monitor  · O2 Device: Room air  Treatment/Session Assessment:    · Response to Treatment:  Pt up in room tolerated well with noted fatigue after up for 15-20 minutes  · Interdisciplinary Collaboration:   o Physical Therapist  o Occupational Therapist  o Registered Nurse  · After treatment position/precautions:   o Supine in bed  o Call light within reach  o RN notified  o ICU monitors     Total Treatment Duration:  OT Patient Time In/Time Out  Time In: 1145  Time Out: [address] [place]

## 2017-11-21 NOTE — PROGRESS NOTES
Problem: Dysphagia (Adult)  Goal: *Speech Goal: (INSERT TEXT)  No speech goals  Speech language pathology: bedside swallow note: Initial Assessment and Discharge   OBSERVATION    NAME/AGE/GENDER: Jana Whitley is a 57 y.o. male  DATE: 11/21/2017  PRIMARY DIAGNOSIS: Thalamic infarct, acute ([facility] [address]       ICD-10: Treatment Diagnosis: pharyngeal dysphagia R13.13  INTERDISCIPLINARY COLLABORATION: Speech Therapist, Registered Nurse and Physician  PRECAUTIONS/ALLERGIES: Review of patient's allergies indicates no known allergies. ASSESSMENT:Based on the objective data described below, Mr. Devika Bhagat presents with minimal clinical s/sx of Dysphagia. Currently he is on a regular diet with thin liquids. Patient was here a few days ago for a TIA. He is admitted now for a CVA. OME was completed and revealed slight left facial droop with slight decrease in lingual lateralization to the left. He was give p.o trials of thin liquids via cup/straw, puree, mixed consistencies and solids. No overt clinical s/sx of aspiration was observed. Slight decrease in laryngeal elevation upon palpation. SLP recommends continue with current diet. No other deficits observed. ST will sign off. MD and RN aware of the above recommendations and all in agreement. PLAN OF CARE:   Patient will benefit from skilled intervention to address the following impairments. RECOMMENDATIONS AND PLANNED INTERVENTIONS (Benefits and precautions of therapy have been discussed with the patient.):  · continue prescribed diet  · Liquids:  regular thin  MEDICATIONS:  · With liquid  COMPENSATORY STRATEGIES/MODIFICATIONS INCLUDING:  · Small sips and bites  OTHER RECOMMENDATIONS (including follow up treatment recommendations):   · none  RECOMMENDED DIET MODIFICATIONS DISCUSSED WITH:  · Hospitalist  · Nursing  · Patient  FREQUENCY/DURATION: Evaluation only. RECOMMENDED REHABILITATION/EQUIPMENT: (at time of discharge pending progress): Due to the probability of continued deficits (see above) this patient will not likely need continued skilled speech therapy after discharge. SUBJECTIVE:   Cooperative  History of Present Injury/Illness: Mr. Yary Dempsey  has a past medical history of Chronic kidney disease; Hypertension; Stroke [facility]) (11/2017); and Thyroid disease. Elaine Renee He also  has no past surgical history on file. Present Symptoms:    Pain Intensity 1: 0  Pain Location 1: Arm, Hand  Pain Orientation 1: Left  Current Medications:   No current facility-administered medications on file prior to encounter. Current Outpatient Prescriptions on File Prior to Encounter   Medication Sig Dispense Refill    aspirin delayed-release 81 mg tablet Take 1 Tab by mouth daily for 30 days. Indications: Cerebral Thromboembolism Prevention 30 Tab 0    atorvastatin (LIPITOR) 40 mg tablet Take 1 Tab by mouth daily for 30 days. Indications: hyperlipidemia 30 Tab 0    valsartan 320 mg tab 320 mg, hydroCHLOROthiazide 25 mg tab 25 mg Take 1 Dose by mouth daily.  levothyroxine (SYNTHROID) 75 mcg tablet Take 75 mcg by mouth Daily (before breakfast).  escitalopram oxalate (LEXAPRO) 10 mg tablet Take 10 mg by mouth daily.  dextroamphetamine-amphetamine (ADDERALL) 20 mg tablet Take 20 mg by mouth daily.        Current Dietary Status:  Regular      History of reflux:  NO    Reflux medication:N/A  Social History/Home Situation: unknown      OBJECTIVE:   Respiratory Status:  Room air     CXR Results:N/A  MRI/CT Results:thalamic infarct  Oral Motor Structure/Speech:  Oral-Motor Structure/Motor Speech  Labial: Left droop (slight )  Dentition: Natural  Oral Hygiene: good  Lingual: Decreased strength  Velum: No impairment  Mandible: No impairment    Cognitive and Communication Status:  Neurologic State: Alert  Orientation Level: Oriented X4  Cognition: Follows commands  Perception: Appears intact  Perseveration: No perseveration noted  Safety/Judgement: Awareness of environment    BEDSIDE SWALLOW EVALUATION  Oral Assessment:  Oral Assessment  Labial: Left droop (slight )  Dentition: Natural  Oral Hygiene: good  Lingual: Decreased strength  Velum: No impairment  Mandible: No impairment  Gag Reflex: Present  P.O. Trials:  Patient Position: upright on the side of the bed    The patient was given teaspoon to tablespoon   amounts of the following:   Consistency Presented: Mixed consistency; Thin liquid; Solid;Puree  How Presented: Self-fed/presented;Cup/sip;Spoon;Straw;Successive swallows    ORAL PHASE:  Bolus Acceptance: No impairment  Bolus Formation/Control: No impairment  Propulsion: No impairment     Oral Residue: None    PHARYNGEAL PHASE:  Initiation of Swallow: No impairment  Laryngeal Elevation: Decreased; Functional  Aspiration Signs/Symptoms: None  Vocal Quality: No impairment           Pharyngeal Phase Characteristics: No impairment, issues, or problems     OTHER OBSERVATIONS:  Rate/bite size: WNL   Endurance: WNL   Comments:       Tool Used: Dysphagia Outcome and Severity Scale (TIBURCIO)    Score Comments   Normal Diet  [] 7 With no strategies or extra time needed   Functional Swallow  [x] 6 May have mild oral or pharyngeal delay       Mild Dysphagia    [] 5 Which may require one diet consistency restricted (those who demonstrate penetration which is entirely cleared on MBS would be included)   Mild-Moderate Dysphagia  [] 4 With 1-2 diet consistencies restricted       Moderate Dysphagia  [] 3 With 2 or more diet consistencies restricted       Moderately Severe Dysphagia  [] 2 With partial PO strategies (trials with ST only)       Severe Dysphagia  [] 1 With inability to tolerate any PO safely          Score:  Initial: 6 Most Recent: X (Date: -- )   Interpretation of Tool: The Dysphagia Outcome and Severity Scale (TIBURCIO) is a simple, easy-to-use, 7-point scale developed to systematically rate the functional severity of dysphagia based on objective assessment and make recommendations for diet level, independence level, and type of nutrition. Score 7 6 5 4 3 2 1   Modifier CH CI CJ CK CL CM CN   ? Swallowing:     - CURRENT STATUS: CI - 1%-19% impaired, limited or restricted    - GOAL STATUS:  CI - 1%-19% impaired, limited or restricted    - D/C STATUS:  CI - 1%-19% impaired, limited or restricted  Payor: Krishna Needs / Plan: Lillian Bailey PPO / Product Type: PPO /     TREATMENT:    (In addition to Assessment/Re-Assessment sessions the following treatments were rendered)  Assessment/Reassessment only, no treatment provided today  MODALITIES:                                                                    ORAL MOTOR  EXERCISES:                                                                                                                                                                      LARYNGEAL / PHARYNGEAL EXERCISES:                                                                                                                                     __________________________________________________________________________________________________  Safety:   After treatment position/precautions:  · Upright in Bed  Treatment Assessment:  Dysphagia evaluation. Minimal clinical s/sx of Dysphagia. No further ST is warranted at this time. Total Treatment Duration:  Time In: 0830  Time Out: JAYSON Ying

## 2017-11-21 NOTE — PROGRESS NOTES
Problem: Mobility Impaired (Adult and Pediatric)  Goal: *Acute Goals and Plan of Care (Insert Text)  STG:  (1.)Mr. Jessica Sams will ambulate with SUPERVISION for 650 feet while scanning hallway left and right and maintaining straight line pathway within 4-7 days. (2.)Mr. Jessica Sams will work on standing balance and coordination exercises in standing and become independent with a home exercises program within 4-7 days. ________________________________________________________________________________________________      Comments:   PHYSICAL THERAPY: Initial Assessment, Treatment Day: Day of Assessment, AM 11/21/2017  OBSERVATION: Hospital Day: 1  Payor: Blake Griffin / Plan: Alma Lozada PPO / Product Type: PPO /      NAME/AGE/GENDER: Noni Love is a 57 y.o. male   PRIMARY DIAGNOSIS: Thalamic infarct, acute ([address] Thalamic infarct, acute ([address] Thalamic infarct, acute ([address]        ICD-10: Treatment Diagnosis:   · Difficulty in walking, Not elsewhere classified (R26.2)  · Unspecified Lack of Coordination (R27.9)   Precaution/Allergies:  Review of patient's allergies indicates no known allergies. ASSESSMENT:     Mr. Jessica Sams presents supine on contact and willing to participate in therapy assessment. Pt eager to work with therapy to improve his deficits. On gross assessment he has good strength in his legs but not so well with seated coordination test for his left lower extremity. Able to do his bed mobility and sit to stand independently and safely. Gait in room also independent. Went out of ICU into stair well and he was able to do stairs with SBA but decreased coordination observed in left leg. In barron, observing gait pt left leg inverts with swing phase and with decreased coordination. As pt continued to walk had muscle fatigue and started to not  the left foot as much.  Also with gait noted that he was not turning his head at all, had him intentionally scan hallway and this affected his balance and coordination. Feel his deficits would benefit from skilled PT interventions while in hospital and on outpatient level when he is discharged from hospital. Will follow. This section established at most recent assessment   PROBLEM LIST (Impairments causing functional limitations):  1. Decreased Ambulation Ability/Technique  2. Decreased Activity Tolerance  3. decreased coordination    INTERVENTIONS PLANNED: (Benefits and precautions of physical therapy have been discussed with the patient.)  1. Gait Training  2. Neuromuscular Re-education/Strengthening  3. Therapeutic Activites  4. Therapeutic Exercise/Strengthening     TREATMENT PLAN: Frequency/Duration: daily for duration of hospital stay  Rehabilitation Potential For Stated Goals: Good     RECOMMENDED REHABILITATION/EQUIPMENT: (at time of discharge pending progress): Due to the probability of continued deficits (see above) this patient will likely need continued skilled physical therapy after discharge. Equipment:    None at this time              HISTORY:   History of Present Injury/Illness (Reason for Referral):  PER MD NOTE: 62F with HTN and hypothyroidism presented with recent acute onset LUE and LLE weakness and numbness. Patient was recently admitted and work up for same complaint and was diagnosed with TIA. CT and CTA head were unremarkable. MRI had showed small vessel ischemic changes without acute infarct. LDL was elevated so patient was discharged on ASA and Statin. TTE was also unremarkable at that time with normal LVEF and no shunts. Patient presented to the ED today after he went to bathroom this morning, noted difference in his LLE compared to right when he ambulated and difficulty using his left hand to turn on light switch. He also thought the left side of his mouth has a slight droop. No speech impediment, no swallowing difficulty noted.    Past Medical History/Comorbidities:   Mr. Jessica Sams  has a past medical history of Chronic kidney disease; Hypertension; Stroke [facility]) (11/2017); and Thyroid disease. Mr. Vilma Bush  has no past surgical history on file. Social History/Living Environment:   Home Environment: Apartment  # Steps to Enter:  (about 3 flights of stairs)  One/Two Story Residence: One story  Height of Each Step (in): 6 inches  Living Alone: Yes  Support Systems: Child(anuradha)  Patient Expects to be Discharged to[de-identified] Apartment  Current DME Used/Available at Home: None  Prior Level of Function/Work/Activity:  Pt living at home, independent with gait and ADLs without assistive devices     Number of Personal Factors/Comorbidities that affect the Plan of Care: 1-2: MODERATE COMPLEXITY   EXAMINATION:   Most Recent Physical Functioning:   Gross Assessment:  AROM: Within functional limits  Strength: Within functional limits (left side slightly weaker than right)  Coordination: Generally decreased, functional (decreased left lower extremity)  Tone: Abnormal (slilghtly increased left lower extremity)  Sensation: Intact               Posture:  Posture (WDL): Within defined limits  Balance:  Sitting: Intact  Standing: Intact Bed Mobility:  Supine to Sit: Independent  Sit to Supine: Independent  Wheelchair Mobility:     Transfers:  Sit to Stand: Independent  Stand to Sit: Independent  Gait:     Swing Pattern: Left symmetrical  Gait Abnormalities: Altered arm swing;Scissoring (but just on left)  Distance (ft): 650 Feet (ft)  Ambulation - Level of Assistance: Stand-by asssistance  Number of Stairs Trained:  (2 flights)  Stairs - Level of Assistance: Stand-by asssistance  Rail Use: Right       Body Structures Involved:  1. Muscles Body Functions Affected:  1. Neuromusculoskeletal  2. Movement Related Activities and Participation Affected:  1. Mobility  2.  Self Care   Number of elements that affect the Plan of Care: 4+: HIGH COMPLEXITY   CLINICAL PRESENTATION:   Presentation: Evolving clinical presentation with changing clinical characteristics: MODERATE COMPLEXITY   CLINICAL DECISION MAKIN[address] [place] [address] AM-PAC 6 Clicks   Basic Mobility Inpatient Short Form  How much difficulty does the patient currently have. .. Unable A Lot A Little None   1. Turning over in bed (including adjusting bedclothes, sheets and blankets)? [] 1   [] 2   [] 3   [x] 4   2. Sitting down on and standing up from a chair with arms ( e.g., wheelchair, bedside commode, etc.)   [] 1   [] 2   [] 3   [x] 4   3. Moving from lying on back to sitting on the side of the bed? [] 1   [] 2   [] 3   [x] 4   How much help from another person does the patient currently need. .. Total A Lot A Little None   4. Moving to and from a bed to a chair (including a wheelchair)? [] 1   [] 2   [] 3   [x] 4   5. Need to walk in hospital room? [] 1   [] 2   [] 3   [x] 4   6. Climbing 3-5 steps with a railing? [] 1   [] 2   [x] 3   [] 4   © 2007, Trustees of [address] [place], [place] [address], under license to [employer]. All rights reserved      Score:  Initial: 23 Most Recent: X (Date: -- )    Interpretation of Tool:  Represents activities that are increasingly more difficult (i.e. Bed mobility, Transfers, Gait). Score 24 23 22-20 19-15 14-10 9-7 6     Modifier CH CI CJ CK CL CM CN      ? Mobility - Walking and Moving Around:     - CURRENT STATUS: CI - 1%-19% impaired, limited or restricted    - GOAL STATUS: CH - 0% impaired, limited or restricted    - D/C STATUS:  CI - 1%-19% impaired, limited or restricted  Payor: Jessica Spearing / Plan: Amauri Gallardo PPO / Product Type: PPO /      Medical Necessity:     · Patient is expected to demonstrate progress in strength, coordination and functional technique to decrease assistance required with high level fucntional mobility. Reason for Services/Other Comments:  · Patient continues to require skilled intervention due to inability to complete functional mobility at his baseling.    Use of outcome tool(s) and clinical judgement create a POC that gives a: Questionable prediction of patient's progress: MODERATE COMPLEXITY            TREATMENT:   (In addition to Assessment/Re-Assessment sessions the following treatments were rendered)   Pre-treatment Symptoms/Complaints:  none  Pain: Initial:   Pain Intensity 1: 0  Post Session:  0/10     Assessment/Reassessment only, no treatment provided today    Braces/Orthotics/Lines/Etc:   · telemetry monitoring  · O2 Device: Room air  Treatment/Session Assessment:    · Response to Treatment:  Tolerated well, aware of his deficits, worried about regaining his coordination  · Interdisciplinary Collaboration:   o Occupational Therapist  o Registered Nurse  · After treatment position/precautions:   o Supine in bed  o Bed/Chair-wheels locked  o Bed in low position  o Call light within reach  o RN notified   · Compliance with Program/Exercises: compliant all of the time. · Recommendations/Intent for next treatment session: \"Next visit will focus on advancements to more challenging activities and reduction in assistance provided\".   Total Treatment Duration:  PT Patient Time In/Time Out  Time In: 1130  Time Out: [address] [facility],

## 2017-11-21 NOTE — DISCHARGE SUMMARY
Physician Discharge Summary       Patient: Obi Johnson MRN: [id number]  SSN: [id number]    YOB: 1955  Age: 57 y.o. Sex: male    PCP: Miranda Riedel, MD    Admit date: 11/21/2017  Admitting Provider: Amira Gabriel MD    Discharge date: 11/21/2017  Discharging Provider: Amira Gabriel MD    * Admission Diagnoses: Thalamic infarct, acute [facility])    * Discharge Diagnoses:    Hospital Problems as of 11/21/2017  Never Reviewed          Codes Class Noted - Resolved POA    * (Principal)Thalamic infarct, acute ([facility] [address] ICD-10-CM: I63.9  ICD-9-CM: 434.91  11/21/2017 - Present Yes        Hyperlipidemia ICD-10-CM: E78.5  ICD-9-CM: 272.4  11/19/2017 - Present Yes        Hypertension ICD-10-CM: I10  ICD-9-CM: 401.9  11/18/2017 - Present Yes        ADD (attention deficit disorder) (Chronic) ICD-10-CM: F98.8  ICD-9-CM: 314.00  11/18/2017 - Present Yes              * Hospital Course: 62F with HTN and hypothyroidism presented with recurrent acute onset LUE and LLE weakness and numbness. Patient was recently admitted and work up for same complaint and was diagnosed with TIA. CT and CTA head were unremarkable. MRI had showed small vessel ischemic changes without acute infarct. LDL was elevated so patient was discharged on ASA and Statin. TTE was also unremarkable at that time with normal LVEF and no shunts. Patient presented to the ED today, 11/21, after he went to bathroom this morning, noted difference in his LLE compared to right when he ambulated and difficulty using his left hand to turn on light switch. He also thought the left side of his mouth has a slight droop. No speech impediment, no swallowing difficulty noted. Patient was placed on observation with telemetry monitoring this morning. He was re-evaluated by PT, OT and SLP. Outpatient PT and OT were recommended. No changes in the consistency of his diet was recommended. Patient has been stable and is requesting to go home.  He has had no acute arrhythmia on tele since this morning. His statin and ASA were not changed since he had only been on them for 3-4 days. * Procedures: * No surgery found *      Consults: None    Significant Diagnostic Studies:   Ct Head Wo Cont    Result Date: 11/21/2017  Impression: Acute right thalamic infarction. No acute intracranial hemorrhage. Patient had TTE, Carotid doppler and MRI done 3 days prior. Discharge Exam:  Visit Vitals    /84    Pulse 97    Temp 98 °F (36.7 °C)    Resp 20    Ht 5' 10\" (1.778 m)    Wt 108.9 kg (240 lb)    SpO2 98%    BMI 34.44 kg/m2       General:                    Alert, cooperative, no distress, appears stated age. Eyes:                        Anicteric sclera, clear conjunctiva, EOMI  Mouth:                       Moist oral mucosa  Lungs:                       Clear to auscultation bilaterally. No Wheezing or Rhonchi. No rales. Heart:                        Regular rate and rhythm,  no murmur, rub or gallop. Neurologic:                Alert and oriented x 3, slight weakness in LUE and LLE compared to right side, 4/5 on left compared to 5/5 on right side, negligible left droop corner of mouth. Gait intact, sensation intact  Psych:                       Normal mood and affect, coherent    * Discharge Condition: good and stable  * Disposition: Home    Discharge Medications:  Current Discharge Medication List      START taking these medications    Details   zolpidem (AMBIEN) 5 mg tablet Take 1 Tab by mouth nightly as needed for Sleep. Max Daily Amount: 5 mg. Qty: 10 Tab, Refills: 0         CONTINUE these medications which have NOT CHANGED    Details   aspirin delayed-release 81 mg tablet Take 1 Tab by mouth daily for 30 days. Indications: Cerebral Thromboembolism Prevention  Qty: 30 Tab, Refills: 0      atorvastatin (LIPITOR) 40 mg tablet Take 1 Tab by mouth daily for 30 days.  Indications: hyperlipidemia  Qty: 30 Tab, Refills: 0      valsartan 320 mg tab 320 mg, hydroCHLOROthiazide 25 mg tab 25 mg Take 1 Dose by mouth daily. levothyroxine (SYNTHROID) 75 mcg tablet Take 75 mcg by mouth Daily (before breakfast). escitalopram oxalate (LEXAPRO) 10 mg tablet Take 10 mg by mouth daily. dextroamphetamine-amphetamine (ADDERALL) 20 mg tablet Take 20 mg by mouth daily. * Follow-up Care/Patient Instructions:   Activity: Activity as tolerated and PT/OT Eval and Treat as outpatient  Diet: Cardiac Diet  Wound Care: None needed    Follow-up Information     Follow up With Details Comments [address] [place] [address] MD Narinder   [address] [place] [address]  [phone]          Patient referred to OP PT and OT    Signed:  Noelle Guan MD  11/21/2017  1:25 PM

## 2017-11-21 NOTE — ED PROVIDER NOTES
HPI Comments: 76-year-old male complaining of left sided numbness and weakness. Patient was recently in the hospital discharged yesterday after being diagnosed with TIA. Patient had left-sided weakness which started on Saturday. Patient had MRI CTA head and neck which revealed atherosclerotic plaques in the carotids and torturous vessels but no occlusions. Patient left the hospital feeling well and then noticed some numbness on the left side with his primary care physician who told him it was residual fluid and should go away. However this when he awoke and has much more weakness and numbness in the left side then immediately went to bed. Patient is a 57 y.o. male presenting with numbness. The history is provided by the patient. Numbness   This is a new problem. The problem has been gradually worsening. There was left upper extremity and left lower extremity focality noted. There has been no fever. Pertinent negatives include no shortness of breath, no chest pain, no vomiting, no altered mental status, no confusion, no headaches, no choking, no nausea and no bowel incontinence. Associated medical issues do not include trauma. Past Medical History:   Diagnosis Date    Chronic kidney disease     kidney stone    Hypertension     Stroke ([facility] [address] 11/2017    Left sided weakness/numbness, artherosclerotic thickening & calcified plaques    Thyroid disease        History reviewed. No pertinent surgical history. History reviewed. No pertinent family history. Social History     Social History    Marital status:      Spouse name: N/A    Number of children: N/A    Years of education: N/A     Occupational History    Not on file.      Social History Main Topics    Smoking status: Never Smoker    Smokeless tobacco: Current User     Types: Chew    Alcohol use Yes      Comment: nightly, 1-2 bottles    Drug use: No    Sexual activity: Not on file     Other Topics Concern    Not on file Social History Narrative         ALLERGIES: Review of patient's allergies indicates no known allergies. Review of Systems   Constitutional: Negative. Negative for activity change. HENT: Negative. Eyes: Negative. Respiratory: Negative. Negative for choking and shortness of breath. Cardiovascular: Negative. Negative for chest pain. Gastrointestinal: Negative. Negative for bowel incontinence, nausea and vomiting. Genitourinary: Negative. Musculoskeletal: Negative. Skin: Negative. Neurological: Positive for numbness. Negative for headaches. Psychiatric/Behavioral: Negative. Negative for confusion. All other systems reviewed and are negative. Vitals:    11/21/17 0600   BP: 163/87   Pulse: 78   Resp: 20   Temp: 97.3 °F (36.3 °C)   SpO2: 99%   Weight: 108.9 kg (240 lb)   Height: 5' 10\" (1.778 m)            Physical Exam   Constitutional: He is oriented to person, place, and time. He appears well-developed and well-nourished. No distress. HENT:   Head: Normocephalic and atraumatic. Right Ear: External ear normal.   Left Ear: External ear normal.   Nose: Nose normal.   Mouth/Throat: Oropharynx is clear and moist. No oropharyngeal exudate. Eyes: Conjunctivae and EOM are normal. Pupils are equal, round, and reactive to light. Right eye exhibits no discharge. Left eye exhibits no discharge. No scleral icterus. Neck: Normal range of motion. Neck supple. No JVD present. No tracheal deviation present. Cardiovascular: Normal rate, regular rhythm and intact distal pulses. Pulmonary/Chest: Effort normal and breath sounds normal. No stridor. No respiratory distress. He has no wheezes. He exhibits no tenderness. Abdominal: Soft. Bowel sounds are normal. He exhibits no distension and no mass. There is no tenderness. Musculoskeletal: Normal range of motion. He exhibits no edema or tenderness. Neurological: He is alert and oriented to person, place, and time.  A cranial nerve deficit and sensory deficit is present. He exhibits abnormal muscle tone. GCS eye subscore is 4. GCS verbal subscore is 5. Patient has loss of fine motor movement left hand   Skin: Skin is warm and dry. No rash noted. He is not diaphoretic. No erythema. No pallor. Psychiatric: He has a normal mood and affect. His behavior is normal. Thought content normal.   Nursing note and vitals reviewed. MDM  Number of Diagnoses or Management Options  Diagnosis management comments: Patient's symptoms started sometime during the night so onset is unclear. The previous MRI and CTA were -3 days ago. However the infarctions be seen on CT today which makes me think that this isn't acute morning. Neurology agrees and does not think that TPA is appropriate. Patient started on aspirin and statins hospitalist was contacted they noted the patient welcome seen in the ED.        Amount and/or Complexity of Data Reviewed  Clinical lab tests: ordered and reviewed  Tests in the radiology section of CPT®: reviewed and ordered  Tests in the medicine section of CPT®: ordered and reviewed    Risk of Complications, Morbidity, and/or Mortality  Presenting problems: high  Diagnostic procedures: high  Management options: high      ED Course       Procedures

## 2017-11-21 NOTE — PROGRESS NOTES
TRANSFER - IN REPORT:    Verbal report received from ANDERSON Barrett on Jhony Cramp  being received from ED(unit) for routine progression of care      Report consisted of patients Situation, Background, Assessment and   Recommendations(SBAR). Information from the following report(s) SBAR, Kardex, ED Summary, MAR, Recent Results, Med Rec Status and Cardiac Rhythm SR was reviewed with the receiving nurse. Opportunity for questions and clarification was provided. Assessment completed upon patients arrival to unit and care assumed. Dual skin assessment completed with Yonathan Pa RN findings were Skin color, texture, turgor normal. No rashes or lesions. Patient ambulatory and turns self, no Allevyn placed.

## 2017-11-27 ENCOUNTER — APPOINTMENT (OUTPATIENT)
Dept: CT IMAGING | Age: 62
End: 2017-11-27
Attending: EMERGENCY MEDICINE
Payer: COMMERCIAL

## 2017-11-27 ENCOUNTER — HOSPITAL ENCOUNTER (EMERGENCY)
Age: 62
Discharge: HOME | End: 2017-11-28
Attending: EMERGENCY MEDICINE
Payer: COMMERCIAL

## 2017-11-27 VITALS
WEIGHT: 240 LBS | TEMPERATURE: 97.5 F | HEIGHT: 70 IN | SYSTOLIC BLOOD PRESSURE: 162 MMHG | OXYGEN SATURATION: 99 % | DIASTOLIC BLOOD PRESSURE: 83 MMHG | RESPIRATION RATE: 16 BRPM | BODY MASS INDEX: 34.36 KG/M2 | HEART RATE: 77 BPM

## 2017-11-27 DIAGNOSIS — I69.30 CHRONIC CEREBROVASCULAR ACCIDENT (CVA): ICD-10-CM

## 2017-11-27 DIAGNOSIS — M79.602 LEFT ARM PAIN: Primary | ICD-10-CM

## 2017-11-27 LAB
ALBUMIN SERPL-MCNC: 3.9 G/DL (ref 3.2–4.6)
ALBUMIN/GLOB SERPL: 1.1 {RATIO} (ref 1.2–3.5)
ALP SERPL-CCNC: 85 U/L (ref 50–136)
ALT SERPL-CCNC: 42 U/L (ref 12–65)
ANION GAP SERPL CALC-SCNC: 13 MMOL/L (ref 7–16)
AST SERPL-CCNC: 28 U/L (ref 15–37)
ATRIAL RATE: 81 BPM
BASOPHILS # BLD: 0 K/UL (ref 0–0.2)
BASOPHILS NFR BLD: 1 % (ref 0–2)
BILIRUB SERPL-MCNC: 0.3 MG/DL (ref 0.2–1.1)
BUN SERPL-MCNC: 16 MG/DL (ref 8–23)
CALCIUM SERPL-MCNC: 9.5 MG/DL (ref 8.3–10.4)
CALCULATED P AXIS, ECG09: 50 DEGREES
CALCULATED R AXIS, ECG10: -9 DEGREES
CALCULATED T AXIS, ECG11: 38 DEGREES
CHLORIDE SERPL-SCNC: 99 MMOL/L (ref 98–107)
CO2 SERPL-SCNC: 26 MMOL/L (ref 21–32)
CREAT SERPL-MCNC: 1.13 MG/DL (ref 0.8–1.5)
DIAGNOSIS, 93000: NORMAL
DIFFERENTIAL METHOD BLD: ABNORMAL
EOSINOPHIL # BLD: 0.4 K/UL (ref 0–0.8)
EOSINOPHIL NFR BLD: 6 % (ref 0.5–7.8)
ERYTHROCYTE [DISTWIDTH] IN BLOOD BY AUTOMATED COUNT: 13.2 % (ref 11.9–14.6)
GLOBULIN SER CALC-MCNC: 3.7 G/DL (ref 2.3–3.5)
GLUCOSE SERPL-MCNC: 191 MG/DL (ref 65–100)
HCT VFR BLD AUTO: 34.8 % (ref 41.1–50.3)
HGB BLD-MCNC: 11.5 G/DL (ref 13.6–17.2)
IMM GRANULOCYTES # BLD: 0 K/UL (ref 0–0.5)
IMM GRANULOCYTES NFR BLD AUTO: 0 % (ref 0–5)
LYMPHOCYTES # BLD: 1.7 K/UL (ref 0.5–4.6)
LYMPHOCYTES NFR BLD: 27 % (ref 13–44)
MCH RBC QN AUTO: 30.5 PG (ref 26.1–32.9)
MCHC RBC AUTO-ENTMCNC: 33 G/DL (ref 31.4–35)
MCV RBC AUTO: 92.3 FL (ref 79.6–97.8)
MONOCYTES # BLD: 0.6 K/UL (ref 0.1–1.3)
MONOCYTES NFR BLD: 10 % (ref 4–12)
NEUTS SEG # BLD: 3.5 K/UL (ref 1.7–8.2)
NEUTS SEG NFR BLD: 56 % (ref 43–78)
P-R INTERVAL, ECG05: 174 MS
PLATELET # BLD AUTO: 330 K/UL (ref 150–450)
PMV BLD AUTO: 10.6 FL (ref 10.8–14.1)
POTASSIUM SERPL-SCNC: 4 MMOL/L (ref 3.5–5.1)
PROT SERPL-MCNC: 7.6 G/DL (ref 6.3–8.2)
Q-T INTERVAL, ECG07: 366 MS
QRS DURATION, ECG06: 96 MS
QTC CALCULATION (BEZET), ECG08: 425 MS
RBC # BLD AUTO: 3.77 M/UL (ref 4.23–5.67)
SODIUM SERPL-SCNC: 138 MMOL/L (ref 136–145)
TROPONIN I BLD-MCNC: 0 NG/ML (ref 0.02–0.05)
VENTRICULAR RATE, ECG03: 81 BPM
WBC # BLD AUTO: 6.2 K/UL (ref 4.3–11.1)

## 2017-11-27 PROCEDURE — 74011250636 HC RX REV CODE- 250/636: Performed by: EMERGENCY MEDICINE

## 2017-11-27 PROCEDURE — 99285 EMERGENCY DEPT VISIT HI MDM: CPT | Performed by: EMERGENCY MEDICINE

## 2017-11-27 PROCEDURE — 70450 CT HEAD/BRAIN W/O DYE: CPT

## 2017-11-27 PROCEDURE — 80053 COMPREHEN METABOLIC PANEL: CPT | Performed by: EMERGENCY MEDICINE

## 2017-11-27 PROCEDURE — 85025 COMPLETE CBC W/AUTO DIFF WBC: CPT | Performed by: EMERGENCY MEDICINE

## 2017-11-27 PROCEDURE — 74011250636 HC RX REV CODE- 250/636

## 2017-11-27 PROCEDURE — 84484 ASSAY OF TROPONIN QUANT: CPT

## 2017-11-27 PROCEDURE — 96374 THER/PROPH/DIAG INJ IV PUSH: CPT | Performed by: EMERGENCY MEDICINE

## 2017-11-27 PROCEDURE — 93005 ELECTROCARDIOGRAM TRACING: CPT | Performed by: EMERGENCY MEDICINE

## 2017-11-27 PROCEDURE — 96376 TX/PRO/DX INJ SAME DRUG ADON: CPT | Performed by: EMERGENCY MEDICINE

## 2017-11-27 RX ORDER — SODIUM CHLORIDE 0.9 % (FLUSH) 0.9 %
5-10 SYRINGE (ML) INJECTION EVERY 8 HOURS
Status: DISCONTINUED | OUTPATIENT
Start: 2017-11-27 | End: 2017-11-28 | Stop reason: HOSPADM

## 2017-11-27 RX ORDER — SODIUM CHLORIDE 0.9 % (FLUSH) 0.9 %
5-10 SYRINGE (ML) INJECTION AS NEEDED
Status: DISCONTINUED | OUTPATIENT
Start: 2017-11-27 | End: 2017-11-28 | Stop reason: HOSPADM

## 2017-11-27 RX ORDER — DIAZEPAM 10 MG/2ML
5 INJECTION INTRAMUSCULAR
Status: COMPLETED | OUTPATIENT
Start: 2017-11-27 | End: 2017-11-27

## 2017-11-27 RX ORDER — BACLOFEN 10 MG/1
10 TABLET ORAL 3 TIMES DAILY
Qty: 15 TAB | Refills: 0 | Status: SHIPPED | OUTPATIENT
Start: 2017-11-27

## 2017-11-27 RX ADMIN — DIAZEPAM 5 MG: 5 INJECTION, SOLUTION INTRAMUSCULAR; INTRAVENOUS at 21:38

## 2017-11-27 RX ADMIN — DIAZEPAM 5 MG: 5 INJECTION, SOLUTION INTRAMUSCULAR; INTRAVENOUS at 23:10

## 2017-11-27 NOTE — ED PROVIDER NOTES
HPI Comments: Recent TIA/CVA admission last week. Started on ASA/Lipitor. States follow up with Neurology in 8 weeks. Symptoms stuttering for last several days. Came in today at urging of brother due to speech difficulty and left arm continuing to have wax/waning symtpoms. Last CT read \"acute thalamic infarct\". Patient is a 57 y.o. male presenting with weakness. The history is provided by the patient and a relative. Extremity Weakness    This is a recurrent problem. The current episode started more than 2 days ago. The problem occurs daily. The problem has been gradually improving. The pain is present in the left arm, left wrist and left hand. The quality of the pain is described as sharp and intermittent. The pain is at a severity of 9/10. The pain is severe. Associated symptoms include numbness, limited range of motion and tingling. Pertinent negatives include no itching and no neck pain. He has tried nothing for the symptoms. There has been no history of extremity trauma. Past Medical History:   Diagnosis Date    Chronic kidney disease     kidney stone    Hypertension     Stroke ([facility] [address] 11/2017    Left sided weakness/numbness, artherosclerotic thickening & calcified plaques    Thyroid disease        History reviewed. No pertinent surgical history. History reviewed. No pertinent family history. Social History     Social History    Marital status:      Spouse name: N/A    Number of children: N/A    Years of education: N/A     Occupational History    Not on file. Social History Main Topics    Smoking status: Never Smoker    Smokeless tobacco: Current User     Types: Chew    Alcohol use Yes      Comment: nightly, 1-2 bottles    Drug use: No    Sexual activity: Not on file     Other Topics Concern    Not on file     Social History Narrative         ALLERGIES: Review of patient's allergies indicates no known allergies. Review of Systems   HENT: Negative. Respiratory: Negative. Cardiovascular: Negative. Gastrointestinal: Negative. Endocrine: Negative. Genitourinary: Negative. Musculoskeletal: Negative. Negative for neck pain. Skin: Negative. Negative for itching. Neurological: Positive for tingling, weakness and numbness. Hematological: Negative. Vitals:    11/27/17 1755   BP: (!) 172/96   Pulse: 80   Resp: 16   Temp: 97.5 °F (36.4 °C)   SpO2: 97%   Weight: 108.9 kg (240 lb)   Height: 5' 10\" (1.778 m)            Physical Exam   Constitutional: He is oriented to person, place, and time. He appears well-developed and well-nourished. Non-toxic appearance. He does not have a sickly appearance. He does not appear ill. No distress. HENT:   Head: Normocephalic and atraumatic. Cardiovascular: Intact distal pulses. Pulmonary/Chest: Effort normal.   Abdominal: Soft. Neurological: He is alert and oriented to person, place, and time. Left arm relatively weak against resistance. Left hand contracture present. Hypersensitive to touch along left UE. Skin: Skin is warm and dry. Psychiatric: He has a normal mood and affect. His behavior is normal.   Nursing note and vitals reviewed. MDM  Number of Diagnoses or Management Options  Chronic cerebrovascular accident (CVA): new and requires workup  Left arm pain: new and requires workup  Diagnosis management comments: Neurologist consulted regarding disposition/plan due to recurrent symptoms and ER visit x3 in <14 days. Still awaiting Neurology consult. Neurologically appears unchanged from previous notes regarding CVA but pain/paresthesia complaints appear acutely exacerbated from previous. Patient's daughter here now and agrees. Neurology agrees unlikely new CVA and possible rare complication of thalamic CVA. Suggests prompt PT to start, Baclofen and follow up. Return PRN and otherwise try to get his therapy started ASAP. Continue with previous medications.        Amount and/or Complexity of Data Reviewed  Clinical lab tests: ordered and reviewed (Results for orders placed or performed during the hospital encounter of 11/27/17  -CBC WITH AUTOMATED DIFF       Result                                            Value                         Ref Range                       WBC                                               6.2                           4.3 - 11.1 K/uL                 RBC                                               3.77 (L)                      4.23 - 5.67 M/uL                HGB                                               11.5 (L)                      13.6 - 17.2 g/dL                HCT                                               34.8 (L)                      41.1 - 50.3 %                   MCV                                               92.3                          79.6 - 97.8 FL                  MCH                                               30.5                          26.1 - 32.9 PG                  MCHC                                              33.0                          31.4 - 35.0 g/dL                RDW                                               13.2                          11.9 - 14.6 %                   PLATELET                                          330                           150 - 450 K/uL                  MPV                                               10.6 (L)                      10.8 - 14.1 FL                  DF                                                AUTOMATED                                                     NEUTROPHILS                                       56                            43 - 78 %                       LYMPHOCYTES                                       27                            13 - 44 %                       MONOCYTES                                         10                            4.0 - 12.0 %                    EOSINOPHILS                                       6 0.5 - 7.8 %                     BASOPHILS                                         1                             0.0 - 2.0 %                     IMMATURE GRANULOCYTES                             0                             0.0 - 5.0 %                     ABS. NEUTROPHILS                                  3.5                           1.7 - 8.2 K/UL                  ABS. LYMPHOCYTES                                  1.7                           0.5 - 4.6 K/UL                  ABS. MONOCYTES                                    0.6                           0.1 - 1.3 K/UL                  ABS. EOSINOPHILS                                  0.4                           0.0 - 0.8 K/UL                  ABS. BASOPHILS                                    0.0                           0.0 - 0.2 K/UL                  ABS. IMM.  GRANS.                                  0.0                           0.0 - 0.5 K/UL             -METABOLIC PANEL, COMPREHENSIVE       Result                                            Value                         Ref Range                       Sodium                                            138                           136 - 145 mmol/L                Potassium                                         4.0                           3.5 - 5.1 mmol/L                Chloride                                          99                            98 - 107 mmol/L                 CO2                                               26                            21 - 32 mmol/L                  Anion gap                                         13                            7 - 16 mmol/L                   Glucose                                           191 (H)                       65 - 100 mg/dL                  BUN                                               16                            8 - 23 MG/DL                    Creatinine                                        1.13                          0.8 - 1.5 MG/DL                 GFR est AA                                        >60                           >60 ml/min/1.73m2               GFR est non-AA                                    >60                           >60 ml/min/1.73m2               Calcium                                           9.5                           8.3 - 10.4 MG/DL                Bilirubin, total                                  0.3                           0.2 - 1.1 MG/DL                 ALT (SGPT)                                        42                            12 - 65 U/L                     AST (SGOT)                                        28                            15 - 37 U/L                     Alk. phosphatase                                  85                            50 - 136 U/L                    Protein, total                                    7.6                           6.3 - 8.2 g/dL                  Albumin                                           3.9                           3.2 - 4.6 g/dL                  Globulin                                          3.7 (H)                       2.3 - 3.5 g/dL                  A-G Ratio                                         1.1 (L)                       1.2 - 3.5                  -POC TROPONIN-I       Result                                            Value                         Ref Range                       Troponin-I (POC)                                  0 (L)                         0.02 - 0.05 ng/ml          -EKG, 12 LEAD, INITIAL       Result                                            Value                         Ref Range                       Ventricular Rate                                  81                            BPM                             Atrial Rate                                       81                            BPM                             P-R Interval                                      174                           ms QRS Duration                                      96                            ms                              Q-T Interval                                      366                           ms                              QTC Calculation (Bezet)                           425                           ms                              Calculated P Axis                                 50                            degrees                         Calculated R Axis                                 -9                            degrees                         Calculated T Axis                                 38                            degrees                         Diagnosis                                                                                                   !! AGE AND GENDER SPECIFIC ECG ANALYSIS !! Normal sinus rhythm   Normal ECG   When compared with ECG of 21-NOV-2017 06:09,   No significant change was found   Confirmed by ST NILS SHARMA MD (), LAKESHA CHAPIN ([id number]) on 11/27/2017 9:15:07 PM     )  Tests in the radiology section of CPT®: ordered and reviewed (Mri Brain Wo Cont    Result Date: 11/19/2017  BRAIN MRI: CLINICAL HISTORY:  Left-sided facial droop, slurred speech, and left arm numbness. TECHNIQUE:  Sagittal T1 weighted, coronal FLAIR, and axial T1 and T2-weighted spin echo, FLAIR, gradient echo, and diffusion-weighted images were obtained. COMPARISON:  HEAD CT yesterday. FINDINGS:  No intracranial mass effect, hemorrhage, or evidence of acute/subacute ischemia is seen. Punctate T2 and FLAIR hyperintensities scattered in the white matter are nonspecific but most consistent with small vessel ischemic disease. The ventricles are normal in size and configuration accounting for the patient's age. Orbits and paranasal sinuses as well as mastoid air cells are clear as imaged. IMPRESSION:  MILD SMALL VESSEL ISCHEMIC DISEASE WITH NO ACUTE INTRACRANIAL ABNORMALITY IDENTIFIED.     Ct Head Wo Cont    Result Date: 11/27/2017  HEAD CT WITHOUT CONTRAST  11/27/2017 HISTORY:   left  weakness  ; symptoms present 3 days. Recent stroke. TECHNIQUE: Noncontrast axial images were obtained through the brain. All CT scans at this facility used dose modulation, interactive reconstruction and/or weight based dosing when appropriate to reduce radiation dose to as low as reasonably achievable. COMPARISON: November 21, 2017 FINDINGS: There is no acute intracranial hemorrhage, significant mass effect or CT evidence of acute large artery territorial infarction. Please note that a hyperacute infarct or small vessel infarct may not be apparent on initial CT imaging. A subacute or old small infarct in the right thalamus is similar in appearance to the previous study. Cerebral volume loss is present. There is no hydrocephalus , intra-axial mass or abnormal extra-axial fluid collection. There are no displaced skull fractures. The mastoid air cells and paranasal sinuses are clear where imaged. IMPRESSION: 1. Subacute or older right thalamic infarct. 2. Cerebral volume loss. Ct Head Wo Cont    Result Date: 11/21/2017  Examination: CT head without contrast History: Left-sided weakness Technique:  Standard head CT was performed without contrast.   Radiation dose reduction techniques were used for this study:  Our CT scanners use one or all of the following: Automated exposure control, adjustment of the mA and/or kVp according to patient's size, iterative reconstruction. Comparison:  MRI brain 11/19/2017, CT head 11/18/2017 Findings: There is no acute intracranial hemorrhage. A focal region of new hypodensity is present in the right thalamus, which does not have any correlate on either the prior MRI or CT examinations. Mild periventricular hypodensities are compatible with sequela of senescent small vessel ischemic disease. Ventricles are normal in size. Basilar cisterns are patent.  There is no depressed skull fracture. Impression: Acute right thalamic infarction. No acute intracranial hemorrhage. Findings were discussed by phone with Dr. Syed Mancia in the emergency department at 0628 AM.    Ct Head Wo Cont    Result Date: 11/18/2017  NONCONTRAST HEAD CT CLINICAL HISTORY:  Code stroke for left-sided facial droop, slurred speech, left arm numbness. COMPARISON:  None. REPORT:   Standard non contrast head CT demonstrates no definite intracranial mass effect none. , hemorrhage, or evidence of acute geographic infarction. The ventricles are normal in size and configuration, accounting for the patient's age. Orbits and  paranasal sinuses are clear where imaged. Bone windows demonstrate no definite fracture or destruction. IMPRESSION:     NO ACUTE INTRACRANIAL ABNORMALITY IDENTIFIED AT NONCONTRAST CT. Cta Head Neck W Wo Cont    Result Date: 11/18/2017  Title:  CT arteriogram of the neck and head. Indication: Cerebrovascular accident, left-sided weakness. Technique: Axial images of the neck and head were obtained after the uneventful administration of intravenous iodinated contrast media. Contrast was used to best identify the arterial structures. Images were reviewed on a separate, free standing, three-dimensional workstation as per the referring physicians request.  All stenosis percentages reference the distal internal carotid artery, as per NASCET criteria. All CT scans at this facility are performed using dose optimization techniques/dose modulation as appropriate, including the following:  automated exposure control; adjustment of the mA and/or kV according to patient size (this includes techniques or standardized protocols for targeted exams where dose is matched to indication/reason for exam); use of iterative reconstruction technique. Comparison: Head CT from earlier in the day. Findings: The visualized lung apices are clear except for mild posterior, dependent, atelectasis. Unremarkable thyroid gland. Mild mucosal thickening in the left maxillary sinus. No obvious intracranial mass or midline shift. No destructive bone lesion. No visualized pulmonary artery filling defect. Patent left subclavian and brachiocephalic veins. Patent superior sagittal sinus and bilateral transverse sinuses. Mild atherosclerotic calcification in the proximal descending thoracic aorta. Bovine arch anatomy. Mild intimal thickening in an otherwise widely patent right brachiocephalic artery and right subclavian artery. Similar findings in the left subclavian artery. The right common carotid artery is moderately tortuous and widely patent with diffuse intimal thickening. Right external carotid artery is tortuous with a slight kink, but no significant stenosis. The right internal carotid artery is widely patent in the carotid bulb with 2 sharp angulations just below the skull base. No cervical right internal carotid artery stenosis appreciated. The left common carotid artery is tortuous with a sharp angulation at the level of the clavicle, but otherwise widely patent in spite of diffuse intimal thickening. The left external carotid artery is patent. The left internal carotid artery is moderately tortuous. There is intimal thickening with 2 punctate calcifications in the carotid bulb. No significant cervical left internal carotid artery stenosis. Right vertebral artery demonstrates diffuse atherosclerotic mural irregularity yet remains patent throughout its cervical and intracranial course. There is a mild, smoothly tapered, narrowing in the intracranial right vertebral artery. Left vertebral artery also demonstrates diffuse mural irregularity. No left vertebral artery stenosis appreciated. Intracranial images demonstrate atherosclerotic plaque in both internal carotid arteries resulting in minimal luminal narrowing. The right and left middle cerebral arteries are patent. The anterior cerebral arteries are patent.  The anterior communicating artery is not definitively visualized. The posterior communicating arteries are not visualized. Basilar artery demonstrates atherosclerotic mural irregularity and remains patent. Similar atherosclerotic disease in the patent right and left posterior cerebral arteries. Impression: Diffuse atherosclerotic wall thickening with scattered calcified plaques. Tortuous common carotid, internal carotid, and vertebral arteries consistent with long-standing hypertension. No significant internal carotid artery stenosis. No intracranial arterial thromboembolism, high-grade stenosis, aneurysm, or vascular malformation. Duplex Carotid Bilateral    Result Date: 11/19/2017  TITLE:  Carotid and Vertebral Ultrasound Examination. INDICATION:  Stroke, transient ischemic attack, hypertension, attention deficit disorder, left-sided weakness/numbness, atherosclerotic wall thickening and calcified plaques. CTA from earlier in the day identifies diffuse atherosclerotic wall thickening with scattered calcified plaques, no significant internal carotid artery stenosis. TECHNIQUE: Grayscale, color, and Doppler interrogation performed. All velocity measurements are made in relation to the distal internal carotid artery. The degree of stenosis is inferred from velocity parameters and cross referenced to published correlations. Velocity criteria are extrapolated from diameter data as defined in the [address] [place] [address] Road symptomatic carotid endarterectomy trial (NASCET). COMPARISON: The above-mentioned CTA. RIGHT NECK:  The peak systolic velocity in the Common Carotid Artery = 56 cm/sec; Internal Carotid Artery = 66 cm/sec. Ratio = 1.2. Mild-moderate intimal thickening throughout the CCA. No significant waveform broadening. Anterograde flow in the vertebral artery. LEFT  NECK:  The peak systolic velocity in the Common Carotid Artery = 66 cm/sec; Internal Carotid Artery = 65 cm/sec.  Ratio = 1.0.  Mild-moderate intimal thickening throughout the CCA and carotid bulb. Small echogenic plaques in the carotid bulb/proximal LICA. Mild waveform broadening in the mid LICA. Anterograde flow in the vertebral artery. IMPRESSION:  NAHUN:  No hemodynamically significant stenosis. LICA:  No hemodynamically significant stenosis.  As already stated and the CTA, there is atherosclerotic disease involving both common carotid and internal carotid arteries.    )    Risk of Complications, Morbidity, and/or Mortality  Presenting problems: high  Management options: high      ED Course       Procedures

## 2017-11-27 NOTE — ED TRIAGE NOTES
C/o lt sided numbness that is becoming increasingly worse since discharged last week. Pt states that lt arm has intermittent spasms that cause pt to jerk entire lt side of his body.

## 2017-11-28 NOTE — PROGRESS NOTES
Referral from Dr Mirtha Deluna to follow up/arrange PT after discharge. Called patient who states that he called [id number] this morning with order received from Dr Dixon Spain and was able to get an appointment for this Thursday, the 30th @ 9am.  Encouraged patient to call me should he need any further assistance.

## 2017-11-28 NOTE — ED NOTES
I have reviewed discharge instructions with the patient. The patient verbalized understanding. Patient left ED via Discharge Method: stretcher to Home with [facility]. Opportunity for questions and clarification provided. Patient given 1 scripts. To continue your aftercare when you leave the hospital, you may receive an automated call from our care team to check in on how you are doing. This is a free service and part of our promise to provide the best care and service to meet your aftercare needs.  If you have questions, or wish to unsubscribe from this service please call [phone]. Thank you for Choosing our [facility] Department.

## 2017-11-30 ENCOUNTER — HOSPITAL ENCOUNTER (OUTPATIENT)
Dept: PHYSICAL THERAPY | Age: 62
Discharge: HOME | End: 2017-11-30
Attending: HOSPITALIST

## 2017-11-30 NOTE — THERAPY EVALUATION
Naseem Duarte  : 1955  Primary: Joeseph Litten Of [facility] Rivera*  Secondary:  [address]  at [place] [id number] [facility], [address] [place] [address],[address], [address] [facility]  [phone]   [phone]        OUTPATIENT OCCUPATIONAL THERAPY: Initial Assessment 2017    ICD-10: Treatment Diagnosis: Other lack of coordination (R27.8)  Precautions/Allergies:   Review of patient's allergies indicates no known allergies. Fall Risk Score: 2 (? 5 = High Risk)  MD Orders: OT to evaluate and treat MEDICAL/REFERRING DIAGNOSIS:   There are no admission diagnoses documented for this encounter. Right thalamic CVA  DATE OF ONSET: approximately 2 weeks ago   REFERRING PHYSICIAN: Ankit Kimble MD  RETURN PHYSICIAN APPOINTMENT: unknown     INITIAL ASSESSMENT:  Mr. Shabnam Martinez presents s/p right thalamic CVA with decreased strength, coordination and range of motion of the non-dominant left upper extremity. Feel he will benefit from skilled occupational therapy to maximize functional use of the left upper extremity to complete activities of daily living independently and safely. PLAN OF CARE:   PROBLEM LIST:  1. Decreased Strength  2. Decreased ADL/Functional Activities  3. Increased Pain  4. Decreased Flexibility/Joint Mobility   5. Decreased gross and fine motor coordination of the left upper extremity  INTERVENTIONS PLANNED:  1. Activities of daily living training  2. Manual therapy training  3. Modalities  4. Neuromuscular re-eduation  5. Therapeutic activity  6. Therapeutic exercise   TREATMENT PLAN:  Effective Dates: 17 TO . Frequency/Duration: 2 times a week for 12 weeks  GOALS: (Goals have been discussed and agreed upon with patient.)  Short-Term Functional Goals: Time Frame: 6 weeks  1. Patient will demonstrate independence with home exercise program for left upper extremity range of motion, strength and coordination.   2. Patient will increase left upper extremity strength by at least one half of one manual muscle grade in order to increase use in self-care activities. 3. Patient will increase left  strength by 2-3 pounds in order to hold soap during bathing activities. Discharge Goals: Time Frame: 12 weeks  1. Patient will increase left shoulder abduction by 10-15 degrees in order to increase functional use in dressing and cooking/home management activities. 2. Patient will increase left fine motor coordination as evidenced by completion of 9-hole peg test in no more than 40 seconds in order to increase functional use in dressing tasks including buttoning and tying shoes and ties. 3. Patient will increase left  strength by at least 4-5 pounds in order to use fork and knife to cut food during meals and meal preparation. Rehabilitation Potential For Stated Goals: Good  Regarding Leann Bravo's therapy, I certify that the treatment plan above will be carried out by a therapist or under their direction. Thank you for this referral,  Kinsey Niño OT     Referring Physician Signature: Glendy Vazquez MD _________________________  Date _________            The information in this section was collected on 11/30/17 (except where otherwise noted). OCCUPATIONAL PROFILE & HISTORY:   History of Present Injury/Illness (Reason for Referral):  Patient came to the ER approximately 2 weeks ago with symptoms of numbness on left side of face, arm and body. He returned to the hospital approximately one week later and was diagnosed with a right thalamic CVA. He and his brothers report that he has seen his PCP, Dr. Leticia Lagos, as well as a teleconference/internet visit with 2 neurologists since returning home from the hospital. Patient reports pain that \"feels like electric shocks and spasms\" in his left neck, arm, trunk and leg. He and his brothers report that he has begun taking Baclofen and Valium to address the pain and spasms today.   Past Medical History/Comorbidities:    Nehemias Adames  has a past medical history of Chronic kidney disease; Hypertension; Stroke [facility]) (11/2017); and Thyroid disease. Mr. Nehemias Adames  has no past surgical history on file. Social History/Living Environment:   Home Environment: Apartment  # Steps to Enter:  (3rd floor - no elevator)  Rails to Enter: Yes  Living Alone: No  Support Systems: Family member(s)  Current DME Used/Available at Home: Shower chair  Tub or Shower Type: Tub/Shower combination     Patient's brother lives across the street and is able to assist him as needed at home. Prior Level of Function/Work/Activity:  Retired but waiting on next contract work in [place] with [employer]  Dominant Side:         RIGHT    Current Medications:    Current Outpatient Prescriptions:     baclofen (LIORESAL) 10 mg tablet, Take 1 Tab by mouth three (3) times daily. , Disp: 15 Tab, Rfl: 0    zolpidem (AMBIEN) 5 mg tablet, Take 1 Tab by mouth nightly as needed for Sleep. Max Daily Amount: 5 mg., Disp: 10 Tab, Rfl: 0    aspirin delayed-release 81 mg tablet, Take 1 Tab by mouth daily for 30 days. Indications: Cerebral Thromboembolism Prevention, Disp: 30 Tab, Rfl: 0    atorvastatin (LIPITOR) 40 mg tablet, Take 1 Tab by mouth daily for 30 days. Indications: hyperlipidemia, Disp: 30 Tab, Rfl: 0    valsartan 320 mg tab 320 mg, hydroCHLOROthiazide 25 mg tab 25 mg, Take 1 Dose by mouth daily. , Disp: , Rfl:     levothyroxine (SYNTHROID) 75 mcg tablet, Take 75 mcg by mouth Daily (before breakfast). , Disp: , Rfl:     escitalopram oxalate (LEXAPRO) 10 mg tablet, Take 10 mg by mouth daily. , Disp: , Rfl:     dextroamphetamine-amphetamine (ADDERALL) 20 mg tablet, Take 20 mg by mouth daily. , Disp: , Rfl:             Date Last Reviewed:  11/30/2017   Complexity Level : Expanded review of therapy/medical records (1-2):  MODERATE COMPLEXITY   ASSESSMENT OF OCCUPATIONAL PERFORMANCE:   ROM:            Right upper extremity WDLs   LUE AROM  L Shoulder ABduction: 100     Strength: RUE Strength  R Shoulder Flexion: 5  R Shoulder ABduction: 5  R Elbow Flexion: 5  R Elbow Extension: 5  R Wrist Flexion: 5  R Wrist Extension: 5  R :  (105 pounds)      LUE Strength  L Shoulder Flexion: 4-  L Shoulder ABduction: 3  L Elbow Flexion: 4-  L Elbow Extension: 4-  L Wrist Flexion: 3+  L Wrist Extension: 3+  L :  (72 pounds)     Balance:             · Sitting: Intact  · Standing: Intact     Coordination:             · Fine Motor Skills-Upper: Right Intact, Left Impaired (9-hole peg test: R=24 seconds; L=54 seconds)  · Gross Motor Skills-Upper: Right Intact, Left Impaired     Mental Status:             · Neurologic State: Alert  · Orientation Level: Oriented X4  · Cognition: Decreased attention/concentration  · Perception: Appears intact  · Perseveration: No perseveration noted  · Safety/Judgement: Insight into deficits     Vision:             · Tracking: Able to track stimulus in all quadrants w/o difficulty  · Acuity: Able to read clock/calendar on wall without difficulty  · Corrective Lenses: Glasses     Activities of Daily Living:           Basic ADLs (From Assessment) Complex ADLs (From Assessment)   Basic ADL  Feeding: Independent  Oral Facial Hygiene/Grooming: Independent  Bathing: Independent  Upper Body Dressing: Additional time, Modified independent  Lower Body Dressing: Additional time, Modified independent  Toileting: Independent Instrumental ADL  Meal Preparation: Moderate assistance  Homemaking:  Moderate assistance  Medication Management: Minimum assistance  Financial Management: Supervision   Grooming/Bathing/Dressing Activities of Daily Living     Cognitive Retraining  Safety/Judgement: Insight into deficits                 Functional Transfers  Toilet Transfer : Independent  Tub Transfer: Supervision     Bed/Mat Mobility  Rolling: Independent  Supine to Sit: Independent  Sit to Supine: Independent  Sit to Stand: Independent  Bed to Chair: Independent  Scooting: Independent Physical Skills Involved:  1. Range of Motion  2. Strength  3. Fine Motor Control  4. Gross Motor Control  5. Pain (acute) Cognitive Skills Affected (resulting in the inability to perform in a timely and safe manner):  1. Sustained Attention  2. Divided Attention Psychosocial Skills Affected:  1. Environmental Adaptation  2. Social Interaction  3. Emotional Regulation   Number of elements that affect the Plan of Care: 5+:  HIGH COMPLEXITY   CLINICAL DECISION MAKING:   Outcome Measure: Tool Used: Disabilities of the Arm, Shoulder and Hand (DASH) Questionnaire - Quick Version  Score:  Initial: 42/55  Most Recent: X/55 (Date: -- )   Interpretation of Score: The DASH is designed to measure the activities of daily living in person's with upper extremity dysfunction or pain. Each section is scored on a 1-5 scale, 5 representing the greatest disability. The scores of each section are added together for a total score of 55. Score 11 12-19 20-28 29-37 38-45 46-54 55   Modifier CH CI CJ CK CL CM CN     Medical Necessity:   · Patient demonstrates good rehab potential due to higher previous functional level. Reason for Services/Other Comments:  · Patient continues to require skilled intervention due to decreased safety and independence with activities of daily living. Clinical Decision-Making Assessment:  Patient is motivated to participate in therapy; progress may be limited by his painful spasms in the left upper extremity. Assessment process, impact of co-morbidities, assessment modification\need for assistance, and selection of interventions: Analytical Complexity:MODERATE COMPLEXITY   TREATMENT:   (In addition to Assessment/Re-Assessment sessions the following treatments were rendered)    Pre-treatment Symptoms/Complaints:  Patient states, \"I feel like my arm goes on lockdown; it is really painful and it jerks. I don't want to see anyone but my brothers when it does that. \"  Pain: Initial: Pain Intensity 1: 4  Pain Location 1: Arm  Pain Orientation 1: Left  Post Session:  4/10     Assessment/Reassessment only, no treatment provided today    Treatment/Session Assessment:    · Response to Treatment:  Patient is agreeable to goals and plan of care. · Compliance with Program/Exercises: Will assess as treatment progresses. · Recommendations/Intent for next treatment session: \"Next visit will focus on advancements to more challenging activities and reduction in assistance provided\".   Total Treatment Duration:  OT Patient Time In/Time Out  Time In: 0905  Time Out: [address] [facility],

## 2017-11-30 NOTE — PROGRESS NOTES
Ambulatory/Rehab Services H2 Model Falls Risk Assessment    Risk Factor Pts. ·   Confusion/Disorientation/Impulsivity  []    4 ·   Symptomatic Depression  []   2 ·   Altered Elimination  []   1 ·   Dizziness/Vertigo  []   1 ·   Gender (Male)  [x]   1 ·   Any administered antiepileptics (anticonvulsants):  []   2 ·   Any administered benzodiazepines:  []   1 ·   Visual Impairment (specify):  []   1 ·   Portable Oxygen Use  []   1 ·   Orthostatic ? BP  []   1 ·   History of Recent Falls (within 3 mos.)  []   5     Ability to Rise from Chair (choose one) Pts. ·   Ability to rise in a single movement  []   0 ·   Pushes up, successful in one attempt  [x]   1 ·   Multiple attempts, but successful  []   3 ·   Unable to rise without assistance  []   4   Total: (5 or greater = High Risk) 2     Falls Prevention Plan:   []                Physical Limitations to Exercise (specify):   []                Mobility Assistance Device (type):   []                Exercise/Equipment Adaptation (specify):    ©2010 [facility] [address] Patent #[id number].  Federal Law prohibits the replication, distribution or use without written permission from [facility] [employer]

## 2017-12-06 ENCOUNTER — HOSPITAL ENCOUNTER (OUTPATIENT)
Dept: PHYSICAL THERAPY | Age: 62
Discharge: HOME | End: 2017-12-06
Attending: HOSPITALIST
Payer: COMMERCIAL

## 2017-12-06 PROCEDURE — 97112 NEUROMUSCULAR REEDUCATION: CPT

## 2017-12-06 PROCEDURE — 97110 THERAPEUTIC EXERCISES: CPT

## 2017-12-06 NOTE — PROGRESS NOTES
Paul Points  : 1955  Primary: Helen Barroso Of [facility] Rivera*  Secondary:  [address] at [address], [address], [place] [address].  [phone]   [phone]        OUTPATIENT OCCUPATIONAL THERAPY: Daily Note 2017    ICD-10: Treatment Diagnosis: Other lack of coordination (R27.8)  Precautions/Allergies:   Review of patient's allergies indicates no known allergies. Fall Risk Score: 2 (? 5 = High Risk)  MD Orders: OT to evaluate and treat MEDICAL/REFERRING DIAGNOSIS:   Cerebral infarction, unspecified [I63.9] Right thalamic CVA  DATE OF ONSET: approximately 2 weeks ago   REFERRING PHYSICIAN: Amrit Holloway MD  RETURN PHYSICIAN APPOINTMENT: unknown     INITIAL ASSESSMENT:  Mr. Nehemias Adames presents s/p right thalamic CVA with decreased strength, coordination and range of motion of the non-dominant left upper extremity. Feel he will benefit from skilled occupational therapy to maximize functional use of the left upper extremity to complete activities of daily living independently and safely. PLAN OF CARE:   PROBLEM LIST:  1. Decreased Strength  2. Decreased ADL/Functional Activities  3. Increased Pain  4. Decreased Flexibility/Joint Mobility   5. Decreased gross and fine motor coordination of the left upper extremity  INTERVENTIONS PLANNED:  1. Activities of daily living training  2. Manual therapy training  3. Modalities  4. Neuromuscular re-eduation  5. Therapeutic activity  6. Therapeutic exercise   TREATMENT PLAN:  Effective Dates: 17 TO . Frequency/Duration: 2 times a week for 12 weeks  GOALS: (Goals have been discussed and agreed upon with patient.)  Short-Term Functional Goals: Time Frame: 6 weeks  1. Patient will demonstrate independence with home exercise program for left upper extremity range of motion, strength and coordination.   2. Patient will increase left upper extremity strength by at least one half of one manual muscle grade in order to increase use in self-care activities. 3. Patient will increase left  strength by 2-3 pounds in order to hold soap during bathing activities. Discharge Goals: Time Frame: 12 weeks  1. Patient will increase left shoulder abduction by 10-15 degrees in order to increase functional use in dressing and cooking/home management activities. 2. Patient will increase left fine motor coordination as evidenced by completion of 9-hole peg test in no more than 40 seconds in order to increase functional use in dressing tasks including buttoning and tying shoes and ties. 3. Patient will increase left  strength by at least 4-5 pounds in order to use fork and knife to cut food during meals and meal preparation. Rehabilitation Potential For Stated Goals: Good  Regarding Chloé Bravo's therapy, I certify that the treatment plan above will be carried out by a therapist or under their direction. Thank you for this referral,  Linda Burk OT     Referring Physician Signature: Jamir Jc MD _________________________  Date _________            The information in this section was collected on 11/30/17 (except where otherwise noted). OCCUPATIONAL PROFILE & HISTORY:   History of Present Injury/Illness (Reason for Referral):  Patient came to the ER approximately 2 weeks ago with symptoms of numbness on left side of face, arm and body. He returned to the hospital approximately one week later and was diagnosed with a right thalamic CVA. He and his brothers report that he has seen his PCP, Dr. Jona Rogel, as well as a teleconference/internet visit with 2 neurologists since returning home from the hospital. Patient reports pain that \"feels like electric shocks and spasms\" in his left neck, arm, trunk and leg. He and his brothers report that he has begun taking Baclofen and Valium to address the pain and spasms today.   Past Medical History/Comorbidities:   Mr. Bisi Brantley  has a past medical history of Chronic kidney disease; Hypertension; Stroke ([facility] [address] (11/2017); and Thyroid disease. Mr. Clemente Lord  has no past surgical history on file. Social History/Living Environment:         Patient's brother lives across the street and is able to assist him as needed at home. Prior Level of Function/Work/Activity:  Retired but waiting on next contract work in [place] [address] with [employer]  Dominant Side:         RIGHT    Current Medications:    Current Outpatient Prescriptions:     baclofen (LIORESAL) 10 mg tablet, Take 1 Tab by mouth three (3) times daily. , Disp: 15 Tab, Rfl: 0    zolpidem (AMBIEN) 5 mg tablet, Take 1 Tab by mouth nightly as needed for Sleep. Max Daily Amount: 5 mg., Disp: 10 Tab, Rfl: 0    aspirin delayed-release 81 mg tablet, Take 1 Tab by mouth daily for 30 days. Indications: Cerebral Thromboembolism Prevention, Disp: 30 Tab, Rfl: 0    atorvastatin (LIPITOR) 40 mg tablet, Take 1 Tab by mouth daily for 30 days. Indications: hyperlipidemia, Disp: 30 Tab, Rfl: 0    valsartan 320 mg tab 320 mg, hydroCHLOROthiazide 25 mg tab 25 mg, Take 1 Dose by mouth daily. , Disp: , Rfl:     levothyroxine (SYNTHROID) 75 mcg tablet, Take 75 mcg by mouth Daily (before breakfast). , Disp: , Rfl:     escitalopram oxalate (LEXAPRO) 10 mg tablet, Take 10 mg by mouth daily. , Disp: , Rfl:     dextroamphetamine-amphetamine (ADDERALL) 20 mg tablet, Take 20 mg by mouth daily. , Disp: , Rfl:             Date Last Reviewed:  12/6/2017   Complexity Level : Expanded review of therapy/medical records (1-2):  MODERATE COMPLEXITY   ASSESSMENT OF OCCUPATIONAL PERFORMANCE:   ROM:            Right upper extremity WDLs         Strength:                          Balance:                   Coordination:                   Mental Status:                   Vision:                   Activities of Daily Living:           Basic ADLs (From Assessment) Complex ADLs (From Assessment)         Grooming/Bathing/Dressing Activities of Daily Living Physical Skills Involved:  1. Range of Motion  2. Strength  3. Fine Motor Control  4. Gross Motor Control  5. Pain (acute) Cognitive Skills Affected (resulting in the inability to perform in a timely and safe manner):  1. Sustained Attention  2. Divided Attention Psychosocial Skills Affected:  1. Environmental Adaptation  2. Social Interaction  3. Emotional Regulation   Number of elements that affect the Plan of Care: 5+:  HIGH COMPLEXITY   CLINICAL DECISION MAKING:   Outcome Measure: Tool Used: Disabilities of the Arm, Shoulder and Hand (DASH) Questionnaire - Quick Version  Score:  Initial: 42/55  Most Recent: X/55 (Date: -- )   Interpretation of Score: The DASH is designed to measure the activities of daily living in person's with upper extremity dysfunction or pain. Each section is scored on a 1-5 scale, 5 representing the greatest disability. The scores of each section are added together for a total score of 55. Score 11 12-19 20-28 29-37 38-45 46-54 55   Modifier CH CI CJ CK CL CM CN     Medical Necessity:   · Patient demonstrates good rehab potential due to higher previous functional level. Reason for Services/Other Comments:  · Patient continues to require skilled intervention due to decreased safety and independence with activities of daily living. Clinical Decision-Making Assessment:  Patient is motivated to participate in therapy; progress may be limited by his painful spasms in the left upper extremity. Assessment process, impact of co-morbidities, assessment modification\need for assistance, and selection of interventions: Analytical Complexity:MODERATE COMPLEXITY   TREATMENT:   (In addition to Assessment/Re-Assessment sessions the following treatments were rendered)    Pre-treatment Symptoms/Complaints:  Patient states, \"the shocks have stopped in my arm, but it feels dead now and I am sleepy all the time. \"  Pain: Initial: Pain Intensity 1: 0  Post Session: 0/10     Therapeutic Exercise: (  30 minutes):  Exercises per grid below to improve strength and dynamic movement of arm - left to improve functional lifting, carrying, reaching and overhead activites. Required minimal visual and verbal cues to promote proper body mechanics. Progressed resistance, range and repetitions as indicated. Date:  12/6/17 Date:   Date:     Activity/Exercise Parameters Parameters Parameters   Shoulder abduction/flexion AROM with red theraband x 20 reps     Chest pulls AROM with red theraband x 20 reps     Elbow flexion/extension AROM with red theraband x 20 reps     UBE Level 6 for 5 minutes; level 7 for 3 minutes and level 8 for 2 minutes     Hand helper 50 pounds  3 x 10     Resistive clothespin Blue to  25 foam pieces               Neuromuscular Re-education: (  15 minutes):  Exercise/activities per grid below to improve coordination. Required minimal visual and verbal cues to promote coordination of left, upper extremity(s) and promote motor control of left, upper extremity(s). Patient used left hand to place 20 pegs, sleeves and washers into the [place] pegboard with occasional assist from the right hand for manipulation of the position of the item in left pincer grasp. Then removed all items and placed them in the correct well. Treatment/Session Assessment:    · Response to Treatment:  Patient is demonstrating improving range of motion and strength of the left upper extremity. · Compliance with Program/Exercises: Will assess as treatment progresses. · Recommendations/Intent for next treatment session: \"Next visit will focus on advancements to more challenging activities and reduction in assistance provided\".   Total Treatment Duration:  OT Patient Time In/Time Out  Time In: 1112  Time Out: Alicia [address], OT

## 2017-12-08 ENCOUNTER — HOSPITAL ENCOUNTER (OUTPATIENT)
Dept: PHYSICAL THERAPY | Age: 62
Discharge: HOME | End: 2017-12-08
Attending: HOSPITALIST

## 2017-12-08 ENCOUNTER — HOSPITAL ENCOUNTER (OUTPATIENT)
Dept: PHYSICAL THERAPY | Age: 62
Discharge: HOME | End: 2017-12-08
Attending: HOSPITALIST
Payer: COMMERCIAL

## 2017-12-08 PROCEDURE — 97162 PT EVAL MOD COMPLEX 30 MIN: CPT

## 2017-12-08 PROCEDURE — 97110 THERAPEUTIC EXERCISES: CPT

## 2017-12-08 PROCEDURE — 97112 NEUROMUSCULAR REEDUCATION: CPT

## 2017-12-08 NOTE — THERAPY EVALUATION
Baljit Paul  : 1955  Primary: Magaly Rios Of Erasto Rivera*  Secondary:  [address] at [place] [id number] [facility], [address] [place] [address] [address], [address] [facility]  [phone]   [phone]           OUTPATIENT PHYSICAL THERAPY:Initial Assessment 2017    ICD-10: Treatment Diagnosis: Other abnormalities of gait and mobility R26.89  Precautions/Allergies:   Review of patient's allergies indicates no known allergies. Fall Risk Score: 1 (? 5 = High Risk)  MD Orders: eval and treat MEDICAL/REFERRING DIAGNOSIS:  . Cerebral infarction, unspecified [I63.9] Right thalamic CVA  DATE OF ONSET: 2017  REFERRING PHYSICIAN: Cristal Dominguez MD  RETURN PHYSICIAN APPOINTMENT:      INITIAL ASSESSMENT:  Mr. Cele Arenas presents with decreased balance and gait, and decreased coordination/proprioception L MARKEL Sumit Morales Patient would benefit from PT to address these problems to improve patient's independence and safety with mobility and daily activities. Thank you. PROBLEM LIST (Impacting functional limitations):  1. Decreased Strength  2. Decreased ADL/Functional Activities  3. Decreased Transfer Abilities  4. Decreased Ambulation Ability/Technique  5. Decreased Balance  6. Decreased Activity Tolerance  7. Decreased [place] with Home Exercise Program INTERVENTIONS PLANNED:  1. Balance Exercise  2. Gait Training  3. Home Exercise Program (HEP)  4. Neuromuscular Re-education/Strengthening  5. Therapeutic Activites  6. Therapeutic Exercise/Strengthening  7. Transfer Training   8. TREATMENT PLAN:  Effective Dates: 2017 TO 3/2/2018. Frequency/Duration: 1-2 times a week for 8-12 weeks  GOALS: (Goals have been discussed and agreed upon with patient.)  Short-Term Functional Goals: Time Frame: 2-4 weeks  1. Patient will demonstrate independence and compliance with home exercise program to improve balance and strength for daily activities.    2. Patient will increase his score on the Bhagat Balance Scale to greater than or equal to 53/56 indicating improved safety and decreased fall risk for daily activities. 3.   Discharge Goals: Time Frame: 8-12 weeks  1. Patient will increase his score on the Bhagat Balance Scale to greater than or equal to 54/56 indicating improved safety and decreased fall risk for daily activities. 2. Patient will increase his score on the dynamic gait index to greater than or equal to 23/24 indicating improved balance and safety for community ambulation. 3. Patient will ambulate with a normal gait pattern over level and unlevel surfaces without evidence of imbalance to improve safety for daily activities. 4.   Rehabilitation Potential For Stated Goals: Good  Regarding Samina Bravo's therapy, I certify that the treatment plan above will be carried out by a therapist or under their direction. Thank you for this referral,  Sudarshan Whiting PT     Referring Physician Signature: Ada Eller MD              Date                    The information in this section was collected on 12/8/17 (except where otherwise noted). HISTORY:   History of Present Injury/Illness (Reason for Referral):  Taking baclofen and valium for L UE pain, improved. Numbness L UE. Moving slower especially with position changes. No dizziness. No falls. No change in vision. Past Medical History/Comorbidities:   Mr. Facundo Jeronimo  has a past medical history of Chronic kidney disease; Hypertension; Stroke [facility]) (11/2017); and Thyroid disease. Mr. Facundo Jeronimo  has no past surgical history on file. Social History/Living Environment:     , was getting ready to retire. Prior Level of Function/Work/Activity:  independent  Dominant Side:         RIGHT  Previous Treatment Approaches:          OT  Personal Factors:          Sex:  male        Age:  57 y.o.       Current Medications:       Current Outpatient Prescriptions:     baclofen (LIORESAL) 10 mg tablet, Take 1 Tab by mouth three (3) times daily. , Disp: 15 Tab, Rfl: 0    zolpidem (AMBIEN) 5 mg tablet, Take 1 Tab by mouth nightly as needed for Sleep. Max Daily Amount: 5 mg., Disp: 10 Tab, Rfl: 0    aspirin delayed-release 81 mg tablet, Take 1 Tab by mouth daily for 30 days. Indications: Cerebral Thromboembolism Prevention, Disp: 30 Tab, Rfl: 0    atorvastatin (LIPITOR) 40 mg tablet, Take 1 Tab by mouth daily for 30 days. Indications: hyperlipidemia, Disp: 30 Tab, Rfl: 0    valsartan 320 mg tab 320 mg, hydroCHLOROthiazide 25 mg tab 25 mg, Take 1 Dose by mouth daily. , Disp: , Rfl:     levothyroxine (SYNTHROID) 75 mcg tablet, Take 75 mcg by mouth Daily (before breakfast). , Disp: , Rfl:     escitalopram oxalate (LEXAPRO) 10 mg tablet, Take 10 mg by mouth daily. , Disp: , Rfl:     dextroamphetamine-amphetamine (ADDERALL) 20 mg tablet, Take 20 mg by mouth daily. , Disp: , Rfl:    Date Last Reviewed:  12/8/17   Number of Personal Factors/Comorbidities that affect the Plan of Care: 1-2: MODERATE COMPLEXITY   EXAMINATION:   Observation/Orthostatic Postural Assessment:          WNL  Strength:          4+/5  Functional Mobility:         Gait/Ambulation:  No AD, decreased arm swing L, mild path deviations with head movements or multitasking. Transfers:  independent        Bed Mobility:  Slowly, independently        Stairs:  With rail, step over step, decreased coordination L LE  Sensation:         Decreased sensation L LE  Postural Control & Balance:  · Bhagat Balance Scale:  51/56.   (A score less than 45/56 indicates high risk of falls)     · Dynamic Gait Index:  18/24.   (A score less than or equal to19/24 is abnormal and predictive of falls)            Body Structures Involved:  1. Nerves  2. Eyes and Ears  3. Muscles Body Functions Affected:  1. Sensory/Pain  2. Neuromusculoskeletal  3. Movement Related Activities and Participation Affected:  1. General Tasks and Demands  2. Mobility  3. Domestic Life  4.  Community, Social and [place] Number of elements (examined above) that affect the Plan of Care: 3: MODERATE COMPLEXITY   CLINICAL PRESENTATION:   Presentation: Evolving clinical presentation with changing clinical characteristics: MODERATE COMPLEXITY   CLINICAL DECISION MAKING:   Outcome Measure: Tool Used: Bhagat Balance Scale  Score:  Initial: 51/56 Most Recent: X/56 (Date: -- )   Interpretation of Score: Each section is scored on a 0-4 scale, 0 representing the patients inability to perform the task and 4 representing independence. The scores of each section are added together for a total score of 56. The higher the patients score, the more independent the patient is. Any score below 45 indicates increased risk for falls. Score 56 55-45 44-34 33-23 22-12 11-1 0   Modifier  CI CJ CK CL CM CN     Tool Used: Dynamic Gait Index  Score:  Initial: 18/24 Most Recent: X/24 (Date: -- )   Interpretation of Score: Each section is scored on a 0-3 scale, 0 representing the patients inability to perform the task and 3 representing independence. The scores of each section are added together for a total score of 24. Any score below 19 indicates increased risk for falls. Score 24 23-19 18-15 14-10 9-5 4-1 0   Modifier  CI CJ CK CL CM CN         Medical Necessity:   · Patient is expected to demonstrate progress in strength, balance and functional technique to improve safety during daily activities. Reason for Services/Other Comments:  · Patient continues to demonstrate capacity to improve strength, balance, gait which will increase independence and increase safety.    Use of outcome tool(s) and clinical judgement create a POC that gives a: Questionable prediction of patient's progress: MODERATE COMPLEXITY            TREATMENT:   (In addition to Assessment/Re-Assessment sessions the following treatments were rendered)  Pre-treatment Symptoms/Complaints:  Doing okay, mostly c/o L UE  Pain: Initial: Pain Intensity 1: 0  Post Session:  0 Assessment only today, no treatment provided. Neuromuscular Re-education ( ):  Exercise/activities per grid below to improve balance, coordination, kinesthetic sense and posture. Required minimal verbal cues to promote static and dynamic balance in standing. HEP: walking with arm swing, marching, tandem stance. [employer]  Treatment/Session Assessment:    · Response to Treatment:  Patient tolerated assessment well. Patient to start to work on  [employer]. Sumit Morales · Compliance with Program/Exercises: Will assess as treatment progresses. · Recommendations/Intent for next treatment session: \"Next visit will focus on advancements to more challenging activities\".   Total Treatment Duration:  PT Patient Time In/Time Out  Time In: 1020  Time Out: 20 [facility] Claudia Mcbride

## 2017-12-08 NOTE — PROGRESS NOTES
Ambulatory/Rehab Services H2 Model Falls Risk Assessment    Risk Factor Pts. ·   Confusion/Disorientation/Impulsivity  []    4 ·   Symptomatic Depression  []   2 ·   Altered Elimination  []   1 ·   Dizziness/Vertigo  []   1 ·   Gender (Male)  [x]   1 ·   Any administered antiepileptics (anticonvulsants):  []   2 ·   Any administered benzodiazepines:  []   1 ·   Visual Impairment (specify):  []   1 ·   Portable Oxygen Use  []   1 ·   Orthostatic ? BP  []   1 ·   History of Recent Falls (within 3 mos.)  []   5     Ability to Rise from Chair (choose one) Pts. ·   Ability to rise in a single movement  [x]   0 ·   Pushes up, successful in one attempt  []   1 ·   Multiple attempts, but successful  []   3 ·   Unable to rise without assistance  []   4   Total: (5 or greater = High Risk) 1     Falls Prevention Plan:   []                Physical Limitations to Exercise (specify):   []                Mobility Assistance Device (type):   []                Exercise/Equipment Adaptation (specify):    ©2010 [facility] [place] Patent #[id number].  Federal Law prohibits the replication, distribution or use without written permission from [facility] [employer]

## 2017-12-08 NOTE — PROGRESS NOTES
Jhony Koo  : 1955  Primary: Deisi Ambrosio Of Erasto Rivera*  Secondary:  [address]  at [place] [address] [facility], [address], [place] [address].  [phone]   [phone]        OUTPATIENT OCCUPATIONAL THERAPY: Daily Note 2017    ICD-10: Treatment Diagnosis: Other lack of coordination (R27.8)  Precautions/Allergies:   Review of patient's allergies indicates no known allergies. Fall Risk Score: 2 (? 5 = High Risk)  MD Orders: OT to evaluate and treat MEDICAL/REFERRING DIAGNOSIS:   Cerebral infarction, unspecified [I63.9] Right thalamic CVA  DATE OF ONSET: approximately 2 weeks ago   REFERRING PHYSICIAN: Gila Medina MD  RETURN PHYSICIAN APPOINTMENT: unknown     INITIAL ASSESSMENT:  Mr. Ady Krause presents s/p right thalamic CVA with decreased strength, coordination and range of motion of the non-dominant left upper extremity. Feel he will benefit from skilled occupational therapy to maximize functional use of the left upper extremity to complete activities of daily living independently and safely. PLAN OF CARE:   PROBLEM LIST:  1. Decreased Strength  2. Decreased ADL/Functional Activities  3. Increased Pain  4. Decreased Flexibility/Joint Mobility   5. Decreased gross and fine motor coordination of the left upper extremity  INTERVENTIONS PLANNED:  1. Activities of daily living training  2. Manual therapy training  3. Modalities  4. Neuromuscular re-eduation  5. Therapeutic activity  6. Therapeutic exercise   TREATMENT PLAN:  Effective Dates: 17 TO . Frequency/Duration: 2 times a week for 12 weeks  GOALS: (Goals have been discussed and agreed upon with patient.)  Short-Term Functional Goals: Time Frame: 6 weeks  1. Patient will demonstrate independence with home exercise program for left upper extremity range of motion, strength and coordination.   2. Patient will increase left upper extremity strength by at least one half of one manual muscle grade in order to increase use in self-care activities. 3. Patient will increase left  strength by 2-3 pounds in order to hold soap during bathing activities. Discharge Goals: Time Frame: 12 weeks  1. Patient will increase left shoulder abduction by 10-15 degrees in order to increase functional use in dressing and cooking/home management activities. 2. Patient will increase left fine motor coordination as evidenced by completion of 9-hole peg test in no more than 40 seconds in order to increase functional use in dressing tasks including buttoning and tying shoes and ties. 3. Patient will increase left  strength by at least 4-5 pounds in order to use fork and knife to cut food during meals and meal preparation. Rehabilitation Potential For Stated Goals: Good  Regarding Samina Bravo's therapy, I certify that the treatment plan above will be carried out by a therapist or under their direction. Thank you for this referral,  Jennifer Nguyen OT     Referring Physician Signature: Ada Eller MD _________________________  Date _________            The information in this section was collected on 11/30/17 (except where otherwise noted). OCCUPATIONAL PROFILE & HISTORY:   History of Present Injury/Illness (Reason for Referral):  Patient came to the ER approximately 2 weeks ago with symptoms of numbness on left side of face, arm and body. He returned to the hospital approximately one week later and was diagnosed with a right thalamic CVA. He and his brothers report that he has seen his PCP, Dr. Zaida Kay, as well as a teleconference/internet visit with 2 neurologists since returning home from the hospital. Patient reports pain that \"feels like electric shocks and spasms\" in his left neck, arm, trunk and leg. He and his brothers report that he has begun taking Baclofen and Valium to address the pain and spasms today.   Past Medical History/Comorbidities:   Mr. Facundo Jeronimo  has a past medical history of Chronic kidney disease; Hypertension; Stroke ([facility] [address] (11/2017); and Thyroid disease. Mr. Cele Arenas  has no past surgical history on file. Social History/Living Environment:         Patient's brother lives across the street and is able to assist him as needed at home. Prior Level of Function/Work/Activity:  Retired but waiting on next contract work in [place] [address] with [employer]  Dominant Side:         RIGHT    Current Medications:    Current Outpatient Prescriptions:     baclofen (LIORESAL) 10 mg tablet, Take 1 Tab by mouth three (3) times daily. , Disp: 15 Tab, Rfl: 0    zolpidem (AMBIEN) 5 mg tablet, Take 1 Tab by mouth nightly as needed for Sleep. Max Daily Amount: 5 mg., Disp: 10 Tab, Rfl: 0    aspirin delayed-release 81 mg tablet, Take 1 Tab by mouth daily for 30 days. Indications: Cerebral Thromboembolism Prevention, Disp: 30 Tab, Rfl: 0    atorvastatin (LIPITOR) 40 mg tablet, Take 1 Tab by mouth daily for 30 days. Indications: hyperlipidemia, Disp: 30 Tab, Rfl: 0    valsartan 320 mg tab 320 mg, hydroCHLOROthiazide 25 mg tab 25 mg, Take 1 Dose by mouth daily. , Disp: , Rfl:     levothyroxine (SYNTHROID) 75 mcg tablet, Take 75 mcg by mouth Daily (before breakfast). , Disp: , Rfl:     escitalopram oxalate (LEXAPRO) 10 mg tablet, Take 10 mg by mouth daily. , Disp: , Rfl:     dextroamphetamine-amphetamine (ADDERALL) 20 mg tablet, Take 20 mg by mouth daily. , Disp: , Rfl:             Date Last Reviewed:  12/8/2017   Complexity Level : Expanded review of therapy/medical records (1-2):  MODERATE COMPLEXITY   ASSESSMENT OF OCCUPATIONAL PERFORMANCE:   ROM:            Right upper extremity WDLs         Strength:                          Balance:                   Coordination:                   Mental Status:                   Vision:                   Activities of Daily Living:           Basic ADLs (From Assessment) Complex ADLs (From Assessment)         Grooming/Bathing/Dressing Activities of Daily Living Physical Skills Involved:  1. Range of Motion  2. Strength  3. Fine Motor Control  4. Gross Motor Control  5. Pain (acute) Cognitive Skills Affected (resulting in the inability to perform in a timely and safe manner):  1. Sustained Attention  2. Divided Attention Psychosocial Skills Affected:  1. Environmental Adaptation  2. Social Interaction  3. Emotional Regulation   Number of elements that affect the Plan of Care: 5+:  HIGH COMPLEXITY   CLINICAL DECISION MAKING:   Outcome Measure: Tool Used: Disabilities of the Arm, Shoulder and Hand (DASH) Questionnaire - Quick Version  Score:  Initial: 42/55  Most Recent: X/55 (Date: -- )   Interpretation of Score: The DASH is designed to measure the activities of daily living in person's with upper extremity dysfunction or pain. Each section is scored on a 1-5 scale, 5 representing the greatest disability. The scores of each section are added together for a total score of 55. Score 11 12-19 20-28 29-37 38-45 46-54 55   Modifier CH CI CJ CK CL CM CN     Medical Necessity:   · Patient demonstrates good rehab potential due to higher previous functional level. Reason for Services/Other Comments:  · Patient continues to require skilled intervention due to decreased safety and independence with activities of daily living. Clinical Decision-Making Assessment:  Patient is motivated to participate in therapy; progress may be limited by his painful spasms in the left upper extremity. Assessment process, impact of co-morbidities, assessment modification\need for assistance, and selection of interventions: Analytical Complexity:MODERATE COMPLEXITY   TREATMENT:   (In addition to Assessment/Re-Assessment sessions the following treatments were rendered)    Pre-treatment Symptoms/Complaints:  Patient states, \"I think I may have overdone it; I did my exercises 3 times yesterday. \"  Pain: Initial: Pain Intensity 1: 0  Post Session:  0/10     Therapeutic Exercise: (  25 minutes):  Exercises per grid below to improve strength and dynamic movement of arm - left to improve functional lifting, carrying, reaching and overhead activites. Required minimal visual and verbal cues to promote proper body mechanics. Progressed resistance, range and repetitions as indicated. Date:  12/6/17 Date:  12/8/17 Date:     Activity/Exercise Parameters Parameters Parameters   Shoulder abduction/flexion AROM with red theraband x 20 reps     Chest pulls AROM with red theraband x 20 reps AROM with green theraband x 20 reps    Elbow flexion/extension AROM with red theraband x 20 reps AROM with green theraband x 20 reps    UBE Level 6 for 5 minutes; level 7 for 3 minutes and level 8 for 2 minutes Level 5 x 10 minutes    Hand helper 50 pounds  3 x 10 55 pounds  3 x 10    Resistive clothespin Blue to  25 foam pieces Blue to  25 foam pieces              Neuromuscular Re-education: (  20 minutes):  Exercise/activities per grid below to improve coordination. Required minimal visual and verbal cues to promote coordination of left, upper extremity(s) and promote motor control of left, upper extremity(s). Patient used left hand to place 25 small plastic pegs into pegboard with left hand only. Then removed pegs, holding up to 10 in palm at a time and placing them back into container one at a time with improving translation from palm to fingertips. Patient unscrewed 8 nuts from mounted screw board and placed them on another screw of matching size and thread pattern using left hand only. Treatment/Session Assessment:    · Response to Treatment:  Patient is demonstrating improving range of motion and strength of the left upper extremity. He reports fatigue after \"overdoing\" his home exercises yesterday. Encouraged patient to do exercises as recommended on written HEP and to decrease repetitions as needed to his comfort.  Noted improving in-hand manipulation of small objects with the left hand. · Compliance with Program/Exercises: Will assess as treatment progresses. · Recommendations/Intent for next treatment session: \"Next visit will focus on advancements to more challenging activities and reduction in assistance provided\".   Total Treatment Duration:  OT Patient Time In/Time Out  Time In: 0935  Time Out: [id number] [facility],

## 2017-12-13 ENCOUNTER — HOSPITAL ENCOUNTER (OUTPATIENT)
Dept: PHYSICAL THERAPY | Age: 62
Discharge: HOME | End: 2017-12-13
Attending: HOSPITALIST
Payer: COMMERCIAL

## 2017-12-13 PROCEDURE — 97112 NEUROMUSCULAR REEDUCATION: CPT

## 2017-12-13 PROCEDURE — 97110 THERAPEUTIC EXERCISES: CPT

## 2017-12-13 NOTE — PROGRESS NOTES
Annette Núñez  : 1955  Primary: [facility]*  Secondary:  [address]  at [place] [id number] [facility], [address], [address] [facility]  [phone]   [phone]        OUTPATIENT OCCUPATIONAL THERAPY: Daily Note 2017    ICD-10: Treatment Diagnosis: Other lack of coordination (R27.8)  Precautions/Allergies:   Review of patient's allergies indicates no known allergies. Fall Risk Score: 2 (? 5 = High Risk)  MD Orders: OT to evaluate and treat MEDICAL/REFERRING DIAGNOSIS:   Cerebral infarction, unspecified [I63.9] Right thalamic CVA  DATE OF ONSET: approximately 2 weeks ago   REFERRING PHYSICIAN: Ananda Chavarria MD  RETURN PHYSICIAN APPOINTMENT: unknown     INITIAL ASSESSMENT:  Mr. Annette Jose presents s/p right thalamic CVA with decreased strength, coordination and range of motion of the non-dominant left upper extremity. Feel he will benefit from skilled occupational therapy to maximize functional use of the left upper extremity to complete activities of daily living independently and safely. PLAN OF CARE:   PROBLEM LIST:  1. Decreased Strength  2. Decreased ADL/Functional Activities  3. Increased Pain  4. Decreased Flexibility/Joint Mobility   5. Decreased gross and fine motor coordination of the left upper extremity  INTERVENTIONS PLANNED:  1. Activities of daily living training  2. Manual therapy training  3. Modalities  4. Neuromuscular re-eduation  5. Therapeutic activity  6. Therapeutic exercise   TREATMENT PLAN:  Effective Dates: 17 TO . Frequency/Duration: 2 times a week for 12 weeks  GOALS: (Goals have been discussed and agreed upon with patient.)  Short-Term Functional Goals: Time Frame: 6 weeks  1. Patient will demonstrate independence with home exercise program for left upper extremity range of motion, strength and coordination.   2. Patient will increase left upper extremity strength by at least one half of one manual muscle grade in order to increase use in self-care activities. 3. Patient will increase left  strength by 2-3 pounds in order to hold soap during bathing activities. Discharge Goals: Time Frame: 12 weeks  1. Patient will increase left shoulder abduction by 10-15 degrees in order to increase functional use in dressing and cooking/home management activities. 2. Patient will increase left fine motor coordination as evidenced by completion of 9-hole peg test in no more than 40 seconds in order to increase functional use in dressing tasks including buttoning and tying shoes and ties. 3. Patient will increase left  strength by at least 4-5 pounds in order to use fork and knife to cut food during meals and meal preparation. Rehabilitation Potential For Stated Goals: Good  Regarding Divya Bravo's therapy, I certify that the treatment plan above will be carried out by a therapist or under their direction. Thank you for this referral,  Nanda Heimlich, OT     Referring Physician Signature: Sharon Gaines MD _________________________  Date _________            The information in this section was collected on 11/30/17 (except where otherwise noted). OCCUPATIONAL PROFILE & HISTORY:   History of Present Injury/Illness (Reason for Referral):  Patient came to the ER approximately 2 weeks ago with symptoms of numbness on left side of face, arm and body. He returned to the hospital approximately one week later and was diagnosed with a right thalamic CVA. He and his brothers report that he has seen his PCP, Dr. Isabel Nolasco, as well as a teleconference/internet visit with 2 neurologists since returning home from the hospital. Patient reports pain that \"feels like electric shocks and spasms\" in his left neck, arm, trunk and leg. He and his brothers report that he has begun taking Baclofen and Valium to address the pain and spasms today.   Past Medical History/Comorbidities:   Mr. Gaviota Ramos  has a past medical history of Chronic kidney disease; Hypertension; Stroke ([facility] [address] (11/2017); and Thyroid disease. Mr. Vikram Amaya  has no past surgical history on file. Social History/Living Environment:         Patient's brother lives across the street and is able to assist him as needed at home. Prior Level of Function/Work/Activity:  Retired but waiting on next contract work in [place] [id number] with [employer]  Dominant Side:         RIGHT    Current Medications:    Current Outpatient Prescriptions:     baclofen (LIORESAL) 10 mg tablet, Take 1 Tab by mouth three (3) times daily. , Disp: 15 Tab, Rfl: 0    zolpidem (AMBIEN) 5 mg tablet, Take 1 Tab by mouth nightly as needed for Sleep. Max Daily Amount: 5 mg., Disp: 10 Tab, Rfl: 0    aspirin delayed-release 81 mg tablet, Take 1 Tab by mouth daily for 30 days. Indications: Cerebral Thromboembolism Prevention, Disp: 30 Tab, Rfl: 0    atorvastatin (LIPITOR) 40 mg tablet, Take 1 Tab by mouth daily for 30 days. Indications: hyperlipidemia, Disp: 30 Tab, Rfl: 0    valsartan 320 mg tab 320 mg, hydroCHLOROthiazide 25 mg tab 25 mg, Take 1 Dose by mouth daily. , Disp: , Rfl:     levothyroxine (SYNTHROID) 75 mcg tablet, Take 75 mcg by mouth Daily (before breakfast). , Disp: , Rfl:     escitalopram oxalate (LEXAPRO) 10 mg tablet, Take 10 mg by mouth daily. , Disp: , Rfl:     dextroamphetamine-amphetamine (ADDERALL) 20 mg tablet, Take 20 mg by mouth daily. , Disp: , Rfl:             Date Last Reviewed:  12/13/2017   Complexity Level : Expanded review of therapy/medical records (1-2):  MODERATE COMPLEXITY   ASSESSMENT OF OCCUPATIONAL PERFORMANCE:   ROM:            Right upper extremity WDLs         Strength:                          Balance:                   Coordination:                   Mental Status:                   Vision:                   Activities of Daily Living:           Basic ADLs (From Assessment) Complex ADLs (From Assessment)         Grooming/Bathing/Dressing Activities of Daily Living Physical Skills Involved:  1. Range of Motion  2. Strength  3. Fine Motor Control  4. Gross Motor Control  5. Pain (acute) Cognitive Skills Affected (resulting in the inability to perform in a timely and safe manner):  1. Sustained Attention  2. Divided Attention Psychosocial Skills Affected:  1. Environmental Adaptation  2. Social Interaction  3. Emotional Regulation   Number of elements that affect the Plan of Care: 5+:  HIGH COMPLEXITY   CLINICAL DECISION MAKING:   Outcome Measure: Tool Used: Disabilities of the Arm, Shoulder and Hand (DASH) Questionnaire - Quick Version  Score:  Initial: 42/55  Most Recent: X/55 (Date: -- )   Interpretation of Score: The DASH is designed to measure the activities of daily living in person's with upper extremity dysfunction or pain. Each section is scored on a 1-5 scale, 5 representing the greatest disability. The scores of each section are added together for a total score of 55. Score 11 12-19 20-28 29-37 38-45 46-54 55   Modifier CH CI CJ CK CL CM CN     Medical Necessity:   · Patient demonstrates good rehab potential due to higher previous functional level. Reason for Services/Other Comments:  · Patient continues to require skilled intervention due to decreased safety and independence with activities of daily living. Clinical Decision-Making Assessment:  Patient is motivated to participate in therapy; progress may be limited by his painful spasms in the left upper extremity. Assessment process, impact of co-morbidities, assessment modification\need for assistance, and selection of interventions: Analytical Complexity:MODERATE COMPLEXITY   TREATMENT:   (In addition to Assessment/Re-Assessment sessions the following treatments were rendered)    Pre-treatment Symptoms/Complaints:  Patient states, \"I have started to notice that my left hand and arm go numb when I use them. \"  Pain: Initial: Pain Intensity 1: 0  Post Session:  0/10 Therapeutic Exercise: (  25 minutes):  Exercises per grid below to improve strength and dynamic movement of arm - left to improve functional lifting, carrying, reaching and overhead activites. Required minimal visual and verbal cues to promote proper body mechanics. Progressed resistance, range and repetitions as indicated. Date:  12/6/17 Date:  12/8/17 Date:  12/13/17   Activity/Exercise Parameters Parameters Parameters   Shoulder abduction/flexion AROM with red theraband x 20 reps     Chest pulls AROM with red theraband x 20 reps AROM with green theraband x 20 reps    Elbow flexion/extension AROM with red theraband x 20 reps AROM with green theraband x 20 reps    UBE Level 6 for 5 minutes; level 7 for 3 minutes and level 8 for 2 minutes Level 5 x 10 minutes Level 5 x 10 minutes   Hand helper 50 pounds  3 x 10 55 pounds  3 x 10 55 pounds  3 x 10   Resistive clothespin Blue to  25 foam pieces Blue to  25 foam pieces Blue to  25 foam pieces   Gentle neck AROM   All planes of motion gently x 5 reps each        Neuromuscular Re-education: (  20 minutes):  Exercise/activities per grid below to improve coordination. Required minimal visual and verbal cues to promote coordination of left, upper extremity(s) and promote motor control of left, upper extremity(s). Patient connected and then unhooked a chain of 20 paper clips with steady use of bilateral hands. He then dealt a deck of cards and participated in Blink card game, matching cards based on color, shape or number as quickly as possible using left hand to hold up to 3 cards at a time. Patient used left hand to place 13 pegs, sleeves and washers into [place] pegboard; noted increased \"shakiness\" and decreased fine motor coordination compared to past 2 treatment sessions; patient unable to successfully removed the items from the pegboard.         Treatment/Session Assessment:    · Response to Treatment:  Patient is complaining of numbness in left hand and arm with sustained use of arm in either functional activities or exercise. He will see his physician today and will seek his advice re: onset of this new symptom. · Compliance with Program/Exercises: Will assess as treatment progresses. · Recommendations/Intent for next treatment session: \"Next visit will focus on advancements to more challenging activities and reduction in assistance provided\".   Total Treatment Duration:  OT Patient Time In/Time Out  Time In: 1030  Time Out: [address] [facility], OT

## 2017-12-15 ENCOUNTER — HOSPITAL ENCOUNTER (OUTPATIENT)
Dept: PHYSICAL THERAPY | Age: 62
Discharge: HOME | End: 2017-12-15
Attending: HOSPITALIST
Payer: COMMERCIAL

## 2017-12-15 PROCEDURE — 97110 THERAPEUTIC EXERCISES: CPT

## 2017-12-15 PROCEDURE — 97112 NEUROMUSCULAR REEDUCATION: CPT

## 2017-12-15 NOTE — PROGRESS NOTES
Farida Dietz  : 1955  Primary: [facility]i [facility]*  Secondary:  [address] at [place] [id number] [facility], [address] [place] [address],[address], [facility] [address]  [phone]   [phone]           OUTPATIENT PHYSICAL THERAPY:Daily Note 12/15/2017    ICD-10: Treatment Diagnosis: Other abnormalities of gait and mobility R26.89  Precautions/Allergies:   Review of patient's allergies indicates no known allergies. Fall Risk Score: 1 (? 5 = High Risk)  MD Orders: eval and treat MEDICAL/REFERRING DIAGNOSIS:  . Cerebral infarction, unspecified [I63.9] Right thalamic CVA  DATE OF ONSET: 2017  REFERRING PHYSICIAN: Rachel Zapata MD  RETURN PHYSICIAN APPOINTMENT:      INITIAL ASSESSMENT:  Mr. Annamaria Burgos presents with decreased balance and gait, and decreased coordination/proprioception L LE. Patient has attended a total of 2 scheduled physical therapy visits including initial evaluation on 2017. Treatment has consisted of balance, coordination, and strengthening to improve overall safety, mobility, and performance with activities of daily living. Patient would benefit from PT to address these problems to improve patient's independence and safety with mobility and daily activities. Thank you. PROBLEM LIST (Impacting functional limitations):  1. Decreased Strength  2. Decreased ADL/Functional Activities  3. Decreased Transfer Abilities  4. Decreased Ambulation Ability/Technique  5. Decreased Balance  6. Decreased Activity Tolerance  7. Decreased [place] with Home Exercise Program INTERVENTIONS PLANNED:  1. Balance Exercise  2. Gait Training  3. Home Exercise Program (HEP)  4. Neuromuscular Re-education/Strengthening  5. Therapeutic Activites  6. Therapeutic Exercise/Strengthening  7. Transfer Training   8. TREATMENT PLAN:  Effective Dates: 2017 TO 3/2/2018.   Frequency/Duration: 1-2 times a week for 8-12 weeks  GOALS: (Goals have been discussed and agreed upon with patient.)  Short-Term Functional Goals: Time Frame: 2-4 weeks  1. Patient will demonstrate independence and compliance with home exercise program to improve balance and strength for daily activities. 2. Patient will increase his score on the Bhagat Balance Scale to greater than or equal to 53/56 indicating improved safety and decreased fall risk for daily activities. Discharge Goals: Time Frame: 8-12 weeks  1. Patient will increase his score on the Bhagat Balance Scale to greater than or equal to 54/56 indicating improved safety and decreased fall risk for daily activities. 2. Patient will increase his score on the dynamic gait index to greater than or equal to 23/24 indicating improved balance and safety for community ambulation. 3. Patient will ambulate with a normal gait pattern over level and unlevel surfaces without evidence of imbalance to improve safety for daily activities. Rehabilitation Potential For Stated Goals: Good            The information in this section was collected on 12/8/17 (except where otherwise noted). HISTORY:   History of Present Injury/Illness (Reason for Referral):  Taking baclofen and valium for L UE pain, improved. Numbness L UE. Moving slower especially with position changes. No dizziness. No falls. No change in vision. Past Medical History/Comorbidities:   Mr. Gail Davila  has a past medical history of Chronic kidney disease; Hypertension; Stroke [facility]) (11/2017); and Thyroid disease. Mr. Gail Davila  has no past surgical history on file. Social History/Living Environment:     , was getting ready to retire. Prior Level of Function/Work/Activity:  independent  Dominant Side:         RIGHT  Previous Treatment Approaches:          OT  Personal Factors:          Sex:  male        Age:  57 y.o. Current Medications:       Current Outpatient Prescriptions:     baclofen (LIORESAL) 10 mg tablet, Take 1 Tab by mouth three (3) times daily. , Disp: 15 Tab, Rfl: 0    zolpidem (AMBIEN) 5 mg tablet, Take 1 Tab by mouth nightly as needed for Sleep. Max Daily Amount: 5 mg., Disp: 10 Tab, Rfl: 0    aspirin delayed-release 81 mg tablet, Take 1 Tab by mouth daily for 30 days. Indications: Cerebral Thromboembolism Prevention, Disp: 30 Tab, Rfl: 0    atorvastatin (LIPITOR) 40 mg tablet, Take 1 Tab by mouth daily for 30 days. Indications: hyperlipidemia, Disp: 30 Tab, Rfl: 0    valsartan 320 mg tab 320 mg, hydroCHLOROthiazide 25 mg tab 25 mg, Take 1 Dose by mouth daily. , Disp: , Rfl:     levothyroxine (SYNTHROID) 75 mcg tablet, Take 75 mcg by mouth Daily (before breakfast). , Disp: , Rfl:     escitalopram oxalate (LEXAPRO) 10 mg tablet, Take 10 mg by mouth daily. , Disp: , Rfl:     dextroamphetamine-amphetamine (ADDERALL) 20 mg tablet, Take 20 mg by mouth daily. , Disp: , Rfl:    Date Last Reviewed:  12/15/2017    Number of Personal Factors/Comorbidities that affect the Plan of Care: 1-2: MODERATE COMPLEXITY   EXAMINATION:   Observation/Orthostatic Postural Assessment:          WNL  Strength:          4+/5  Functional Mobility:         Gait/Ambulation:  No AD, decreased arm swing L, mild path deviations with head movements or multitasking. Transfers:  independent        Bed Mobility:  Slowly, independently        Stairs:  With rail, step over step, decreased coordination L LE  Sensation:         Decreased sensation L LE  Postural Control & Balance:  · Bhagat Balance Scale:  51/56.   (A score less than 45/56 indicates high risk of falls)     · Dynamic Gait Index:  18/24.   (A score less than or equal to19/24 is abnormal and predictive of falls)      Body Structures Involved:  1. Nerves  2. Eyes and Ears  3. Muscles Body Functions Affected:  1. Sensory/Pain  2. Neuromusculoskeletal  3. Movement Related Activities and Participation Affected:  1. General Tasks and Demands  2. Mobility  3. Domestic Life  4.  Community, Social and [place]   Number of elements (examined above) that affect the Plan of Care: 3: MODERATE COMPLEXITY   CLINICAL PRESENTATION:   Presentation: Evolving clinical presentation with changing clinical characteristics: MODERATE COMPLEXITY   CLINICAL DECISION MAKING:   Outcome Measure: Tool Used: Bhagat Balance Scale  Score:  Initial: 51/56 Most Recent: X/56 (Date: -- )   Interpretation of Score: Each section is scored on a 0-4 scale, 0 representing the patients inability to perform the task and 4 representing independence. The scores of each section are added together for a total score of 56. The higher the patients score, the more independent the patient is. Any score below 45 indicates increased risk for falls. Score 56 55-45 44-34 33-23 22-12 11-1 0   Modifier  CI CJ CK CL CM CN     Tool Used: Dynamic Gait Index  Score:  Initial: 18/24 Most Recent: X/24 (Date: -- )   Interpretation of Score: Each section is scored on a 0-3 scale, 0 representing the patients inability to perform the task and 3 representing independence. The scores of each section are added together for a total score of 24. Any score below 19 indicates increased risk for falls. Score 24 23-19 18-15 14-10 9-5 4-1 0   Modifier  CI CJ CK CL CM CN       Medical Necessity:   · Patient is expected to demonstrate progress in strength, balance and functional technique to improve safety during daily activities. Reason for Services/Other Comments:  · Patient continues to demonstrate capacity to improve strength, balance, gait which will increase independence and increase safety. Use of outcome tool(s) and clinical judgement create a POC that gives a: Questionable prediction of patient's progress: MODERATE COMPLEXITY            TREATMENT:   (In addition to Assessment/Re-Assessment sessions the following treatments were rendered)  Pre-treatment Symptoms/Complaints:  12/15/2017: Patient reports he is more coordinated muscularly than sensory.      Pain: Initial: Pain Intensity 1: 0  Post Session: 0     NEUROMUSCULAR RE-EDUCATION: (30 minutes):  Exercise/activities per grid below to improve balance, coordination, kinesthetic sense, posture and proprioception. Required minimal visual, verbal and manual cues to promote static and dynamic balance in standing, promote coordination of bilateral, lower extremity(s) and promote motor control of bilateral, lower extremity(s). Date:  12/15/2017   Activity/Exercise Parameters   Marching in hallway 4 laps   Walking backward 4 laps     THERAPEUTIC EXERCISE: (15 minutes):  Exercises per grid below to improve mobility and strength. Required minimal verbal and manual cues to promote proper body alignment, promote proper body posture, promote proper body mechanics and promote proper body breathing techniques. Progressed resistance, range, repetitions and complexity of movement as indicated. Date:  12/15/2017   Activity/Exercise Parameters   Treadmill 10 minutes  2.0-2.5 mph   HEP: walking with arm swing, marching, tandem stance. [employer]  Treatment/Session Assessment:    · Response to Treatment:  Patient tolerated treatment well. Patient required several rest breaks due to fatigue an L UE numbness. · Compliance with Program/Exercises: Will assess as treatment progresses. · Recommendations/Intent for next treatment session: \"Next visit will focus on advancements to more challenging activities\".   Total Treatment Duration:  PT Patient Time In/Time Out  Time In: 1400  Time Out: 2000 Lolita Rodarte PT

## 2017-12-15 NOTE — PROGRESS NOTES
Paul Points  : 1955  Primary: [facility]*  Secondary:  [address] [place]  [facility]  [place] [address], [address], [address] [facility]  [phone]   [phone]        OUTPATIENT OCCUPATIONAL THERAPY: Daily Note 12/15/2017    ICD-10: Treatment Diagnosis: Other lack of coordination (R27.8)  Precautions/Allergies:   Review of patient's allergies indicates no known allergies. Fall Risk Score: 2 (? 5 = High Risk)  MD Orders: OT to evaluate and treat MEDICAL/REFERRING DIAGNOSIS:   Cerebral infarction, unspecified [I63.9] Right thalamic CVA  DATE OF ONSET: approximately 2 weeks ago   REFERRING PHYSICIAN: Amrit Holloway MD  RETURN PHYSICIAN APPOINTMENT: unknown     INITIAL ASSESSMENT:  Mr. Nehemias Adames presents s/p right thalamic CVA with decreased strength, coordination and range of motion of the non-dominant left upper extremity. Feel he will benefit from skilled occupational therapy to maximize functional use of the left upper extremity to complete activities of daily living independently and safely. PLAN OF CARE:   PROBLEM LIST:  1. Decreased Strength  2. Decreased ADL/Functional Activities  3. Increased Pain  4. Decreased Flexibility/Joint Mobility   5. Decreased gross and fine motor coordination of the left upper extremity  INTERVENTIONS PLANNED:  1. Activities of daily living training  2. Manual therapy training  3. Modalities  4. Neuromuscular re-eduation  5. Therapeutic activity  6. Therapeutic exercise   TREATMENT PLAN:  Effective Dates: 17 TO . Frequency/Duration: 2 times a week for 12 weeks  GOALS: (Goals have been discussed and agreed upon with patient.)  Short-Term Functional Goals: Time Frame: 6 weeks  1. Patient will demonstrate independence with home exercise program for left upper extremity range of motion, strength and coordination.   2. Patient will increase left upper extremity strength by at least one half of one manual muscle grade in order to increase use in self-care activities. 3. Patient will increase left  strength by 2-3 pounds in order to hold soap during bathing activities. Discharge Goals: Time Frame: 12 weeks  1. Patient will increase left shoulder abduction by 10-15 degrees in order to increase functional use in dressing and cooking/home management activities. 2. Patient will increase left fine motor coordination as evidenced by completion of 9-hole peg test in no more than 40 seconds in order to increase functional use in dressing tasks including buttoning and tying shoes and ties. 3. Patient will increase left  strength by at least 4-5 pounds in order to use fork and knife to cut food during meals and meal preparation. Rehabilitation Potential For Stated Goals: Good  Regarding Lui Bravo's therapy, I certify that the treatment plan above will be carried out by a therapist or under their direction. Thank you for this referral,  Sobia Harris OT     Referring Physician Signature: Cristal Dominguez MD _________________________  Date _________            The information in this section was collected on 11/30/17 (except where otherwise noted). OCCUPATIONAL PROFILE & HISTORY:   History of Present Injury/Illness (Reason for Referral):  Patient came to the ER approximately 2 weeks ago with symptoms of numbness on left side of face, arm and body. He returned to the hospital approximately one week later and was diagnosed with a right thalamic CVA. He and his brothers report that he has seen his PCP, Dr. Kavon King, as well as a teleconference/internet visit with 2 neurologists since returning home from the hospital. Patient reports pain that \"feels like electric shocks and spasms\" in his left neck, arm, trunk and leg. He and his brothers report that he has begun taking Baclofen and Valium to address the pain and spasms today.   Past Medical History/Comorbidities:   Mr. Cele Arenas  has a past medical history of Chronic kidney disease; Hypertension; Stroke ([facility] [address] (11/2017); and Thyroid disease. Mr. Bettye Arthur  has no past surgical history on file. Social History/Living Environment:         Patient's brother lives across the street and is able to assist him as needed at home. Prior Level of Function/Work/Activity:  Retired but waiting on next contract work in [place] [address] with [employer]  Dominant Side:         RIGHT    Current Medications:    Current Outpatient Prescriptions:     baclofen (LIORESAL) 10 mg tablet, Take 1 Tab by mouth three (3) times daily. , Disp: 15 Tab, Rfl: 0    zolpidem (AMBIEN) 5 mg tablet, Take 1 Tab by mouth nightly as needed for Sleep. Max Daily Amount: 5 mg., Disp: 10 Tab, Rfl: 0    aspirin delayed-release 81 mg tablet, Take 1 Tab by mouth daily for 30 days. Indications: Cerebral Thromboembolism Prevention, Disp: 30 Tab, Rfl: 0    atorvastatin (LIPITOR) 40 mg tablet, Take 1 Tab by mouth daily for 30 days. Indications: hyperlipidemia, Disp: 30 Tab, Rfl: 0    valsartan 320 mg tab 320 mg, hydroCHLOROthiazide 25 mg tab 25 mg, Take 1 Dose by mouth daily. , Disp: , Rfl:     levothyroxine (SYNTHROID) 75 mcg tablet, Take 75 mcg by mouth Daily (before breakfast). , Disp: , Rfl:     escitalopram oxalate (LEXAPRO) 10 mg tablet, Take 10 mg by mouth daily. , Disp: , Rfl:     dextroamphetamine-amphetamine (ADDERALL) 20 mg tablet, Take 20 mg by mouth daily. , Disp: , Rfl:             Date Last Reviewed:  12/15/2017   Complexity Level : Expanded review of therapy/medical records (1-2):  MODERATE COMPLEXITY   ASSESSMENT OF OCCUPATIONAL PERFORMANCE:   ROM:            Right upper extremity WDLs         Strength:                          Balance:                   Coordination:                   Mental Status:                   Vision:                   Activities of Daily Living:           Basic ADLs (From Assessment) Complex ADLs (From Assessment)         Grooming/Bathing/Dressing Activities of Daily Living Physical Skills Involved:  1. Range of Motion  2. Strength  3. Fine Motor Control  4. Gross Motor Control  5. Pain (acute) Cognitive Skills Affected (resulting in the inability to perform in a timely and safe manner):  1. Sustained Attention  2. Divided Attention Psychosocial Skills Affected:  1. Environmental Adaptation  2. Social Interaction  3. Emotional Regulation   Number of elements that affect the Plan of Care: 5+:  HIGH COMPLEXITY   CLINICAL DECISION MAKING:   Outcome Measure: Tool Used: Disabilities of the Arm, Shoulder and Hand (DASH) Questionnaire - Quick Version  Score:  Initial: 42/55  Most Recent: X/55 (Date: -- )   Interpretation of Score: The DASH is designed to measure the activities of daily living in person's with upper extremity dysfunction or pain. Each section is scored on a 1-5 scale, 5 representing the greatest disability. The scores of each section are added together for a total score of 55. Score 11 12-19 20-28 29-37 38-45 46-54 55   Modifier CH CI CJ CK CL CM CN     Medical Necessity:   · Patient demonstrates good rehab potential due to higher previous functional level. Reason for Services/Other Comments:  · Patient continues to require skilled intervention due to decreased safety and independence with activities of daily living. Clinical Decision-Making Assessment:  Patient is motivated to participate in therapy; progress may be limited by his painful spasms in the left upper extremity. Assessment process, impact of co-morbidities, assessment modification\need for assistance, and selection of interventions: Analytical Complexity:MODERATE COMPLEXITY   TREATMENT:   (In addition to Assessment/Re-Assessment sessions the following treatments were rendered)    Pre-treatment Symptoms/Complaints:  Patient states, \"that card game is the most mental stimulation I have had; can we do more of that? \"  Pain: Initial: Pain Intensity 1: 0  Post Session:  0/10 Therapeutic Exercise: (  20 minutes):  Exercises per grid below to improve strength and dynamic movement of arm - left to improve functional lifting, carrying, reaching and overhead activites. Required minimal visual and verbal cues to promote proper body mechanics. Progressed resistance, range and repetitions as indicated. Date:  12/6/17 Date:  12/8/17 Date:  12/13/17 Date:  12/15/17   Activity/Exercise Parameters Parameters Parameters    Shoulder abduction/flexion AROM with red theraband x 20 reps      Chest pulls AROM with red theraband x 20 reps AROM with green theraband x 20 reps     Elbow flexion/extension AROM with red theraband x 20 reps AROM with green theraband x 20 reps     UBE Level 6 for 5 minutes; level 7 for 3 minutes and level 8 for 2 minutes Level 5 x 10 minutes Level 5 x 10 minutes Level 5 x 10 minutes   Hand helper 50 pounds  3 x 10 55 pounds  3 x 10 55 pounds  3 x 10 55 pounds  3 x 10   Resistive clothespin Blue to  25 foam pieces Blue to  25 foam pieces Blue to  25 foam pieces Blue to  25 foam pieces   Gentle neck AROM   All planes of motion gently x 5 reps each         Neuromuscular Re-education: (  25 minutes):  Exercise/activities per grid below to improve coordination. Required minimal visual and verbal cues to promote coordination of left, upper extremity(s) and promote motor control of left, upper extremity(s). Patient dealt a deck of cards and participated in [employer] in the [address] [place] [address] [employer] card game, organizing up to 8 columns of cards in descending numerical order and alternating color using left hand to play and draw cards. Patient used left hand to place 10 small plastic pegs pegboard then removed them, holding them all in palm while pulling out the others. Patient followed pattern to create a flower using small flat geometric wooden shapes then placed them back into container using left hand only.         Treatment/Session Assessment: · Response to Treatment:  Patient is continuing to improve in left upper extremity strength and coordination as well as divided attention. · Compliance with Program/Exercises: Will assess as treatment progresses. · Recommendations/Intent for next treatment session: \"Next visit will focus on advancements to more challenging activities and reduction in assistance provided\".   Total Treatment Duration:  OT Patient Time In/Time Out  Time In: 1445  Time Out: Lydia 227, OT

## 2017-12-18 ENCOUNTER — HOSPITAL ENCOUNTER (OUTPATIENT)
Dept: PHYSICAL THERAPY | Age: 62
Discharge: HOME | End: 2017-12-18
Attending: HOSPITALIST
Payer: COMMERCIAL

## 2017-12-18 NOTE — PROGRESS NOTES
Mr. Frank Rodrigez cancelled today's appointment secondary to \"not having control\" over his left side. OT called to check on patient; he states that he had not taken his medication and began shaking on his left side and was unable to finish getting ready for therapy. He reports that his symptoms subsided once he took his medication as prescribed. Will continue with plan of care at next visit.    Malvin Ortez, OT

## 2017-12-22 ENCOUNTER — HOSPITAL ENCOUNTER (OUTPATIENT)
Dept: PHYSICAL THERAPY | Age: 62
Discharge: HOME | End: 2017-12-22
Attending: HOSPITALIST
Payer: COMMERCIAL

## 2017-12-22 PROCEDURE — 97110 THERAPEUTIC EXERCISES: CPT

## 2017-12-22 PROCEDURE — 97112 NEUROMUSCULAR REEDUCATION: CPT

## 2017-12-22 NOTE — PROGRESS NOTES
Annette Núñez  : 1955  Primary: [facility]*  Secondary:  [address] at [facility], [address] [facility] [address]  [phone]   [phone]           OUTPATIENT PHYSICAL THERAPY:Daily Note 2017    ICD-10: Treatment Diagnosis: Other abnormalities of gait and mobility R26.89  Precautions/Allergies:   Review of patient's allergies indicates no known allergies. Fall Risk Score: 1 (? 5 = High Risk)  MD Orders: eval and treat MEDICAL/REFERRING DIAGNOSIS:  . Cerebral infarction, unspecified [I63.9] Right thalamic CVA  DATE OF ONSET: 2017  REFERRING PHYSICIAN: Ananda Chavarria MD  RETURN PHYSICIAN APPOINTMENT:      INITIAL ASSESSMENT:  Mr. Annette Jose presents with decreased balance and gait, and decreased coordination/proprioception L LE. Patient has attended a total of 3 scheduled physical therapy visits including initial evaluation on 2017. Treatment has consisted of balance, coordination, and strengthening to improve overall safety, mobility, and performance with activities of daily living. Patient would benefit from PT to address these problems to improve patient's independence and safety with mobility and daily activities. Thank you. PROBLEM LIST (Impacting functional limitations):  1. Decreased Strength  2. Decreased ADL/Functional Activities  3. Decreased Transfer Abilities  4. Decreased Ambulation Ability/Technique  5. Decreased Balance  6. Decreased Activity Tolerance  7. Decreased [place] with Home Exercise Program INTERVENTIONS PLANNED:  1. Balance Exercise  2. Gait Training  3. Home Exercise Program (HEP)  4. Neuromuscular Re-education/Strengthening  5. Therapeutic Activites  6. Therapeutic Exercise/Strengthening  7. Transfer Training   8. TREATMENT PLAN:  Effective Dates: 2017 TO 3/2/2018.   Frequency/Duration: 1-2 times a week for 8-12 weeks  GOALS: (Goals have been discussed and agreed upon with patient.)  Short-Term Functional Goals: Time Frame: 2-4 weeks  1. Patient will demonstrate independence and compliance with home exercise program to improve balance and strength for daily activities. 2. Patient will increase his score on the Bhagat Balance Scale to greater than or equal to 53/56 indicating improved safety and decreased fall risk for daily activities. Discharge Goals: Time Frame: 8-12 weeks  1. Patient will increase his score on the Bhagat Balance Scale to greater than or equal to 54/56 indicating improved safety and decreased fall risk for daily activities. 2. Patient will increase his score on the dynamic gait index to greater than or equal to 23/24 indicating improved balance and safety for community ambulation. 3. Patient will ambulate with a normal gait pattern over level and unlevel surfaces without evidence of imbalance to improve safety for daily activities. Rehabilitation Potential For Stated Goals: Good            The information in this section was collected on 12/8/17 (except where otherwise noted). HISTORY:   History of Present Injury/Illness (Reason for Referral):  Taking baclofen and valium for L UE pain, improved. Numbness L UE. Moving slower especially with position changes. No dizziness. No falls. No change in vision. Past Medical History/Comorbidities:   Mr. Gaviota Ramos  has a past medical history of Chronic kidney disease; Hypertension; Stroke [facility]) (11/2017); and Thyroid disease. Mr. Gaviota Ramos  has no past surgical history on file. Social History/Living Environment:     , was getting ready to retire. Prior Level of Function/Work/Activity:  independent  Dominant Side:         RIGHT  Previous Treatment Approaches:          OT  Personal Factors:          Sex:  male        Age:  57 y.o. Current Medications:       Current Outpatient Prescriptions:     baclofen (LIORESAL) 10 mg tablet, Take 1 Tab by mouth three (3) times daily. , Disp: 15 Tab, Rfl: 0    zolpidem (AMBIEN) 5 mg tablet, Take 1 Tab by mouth nightly as needed for Sleep. Max Daily Amount: 5 mg., Disp: 10 Tab, Rfl: 0    valsartan 320 mg tab 320 mg, hydroCHLOROthiazide 25 mg tab 25 mg, Take 1 Dose by mouth daily. , Disp: , Rfl:     levothyroxine (SYNTHROID) 75 mcg tablet, Take 75 mcg by mouth Daily (before breakfast). , Disp: , Rfl:     escitalopram oxalate (LEXAPRO) 10 mg tablet, Take 10 mg by mouth daily. , Disp: , Rfl:     dextroamphetamine-amphetamine (ADDERALL) 20 mg tablet, Take 20 mg by mouth daily. , Disp: , Rfl:    Date Last Reviewed:  12/22/2017    Number of Personal Factors/Comorbidities that affect the Plan of Care: 1-2: MODERATE COMPLEXITY   EXAMINATION:   Observation/Orthostatic Postural Assessment:          WNL  Strength:          4+/5  Functional Mobility:         Gait/Ambulation:  No AD, decreased arm swing L, mild path deviations with head movements or multitasking. Transfers:  independent        Bed Mobility:  Slowly, independently        Stairs:  With rail, step over step, decreased coordination L LE  Sensation:         Decreased sensation L LE  Postural Control & Balance:  · Bhagat Balance Scale:  51/56.   (A score less than 45/56 indicates high risk of falls)     · Dynamic Gait Index:  18/24.   (A score less than or equal to19/24 is abnormal and predictive of falls)      Body Structures Involved:  1. Nerves  2. Eyes and Ears  3. Muscles Body Functions Affected:  1. Sensory/Pain  2. Neuromusculoskeletal  3. Movement Related Activities and Participation Affected:  1. General Tasks and Demands  2. Mobility  3. Domestic Life  4. Community, Social and [place]   Number of elements (examined above) that affect the Plan of Care: 3: MODERATE COMPLEXITY   CLINICAL PRESENTATION:   Presentation: Evolving clinical presentation with changing clinical characteristics: MODERATE COMPLEXITY   CLINICAL DECISION MAKING:   Outcome Measure:    Tool Used: Elvia Los Balance Scale  Score:  Initial: 51/56 Most Recent: X/56 (Date: -- )   Interpretation of Score: Each section is scored on a 0-4 scale, 0 representing the patients inability to perform the task and 4 representing independence. The scores of each section are added together for a total score of 56. The higher the patients score, the more independent the patient is. Any score below 45 indicates increased risk for falls. Score 56 55-45 44-34 33-23 22-12 11-1 0   Modifier CH CI CJ CK CL CM CN     Tool Used: Dynamic Gait Index  Score:  Initial: 18/24 Most Recent: X/24 (Date: -- )   Interpretation of Score: Each section is scored on a 0-3 scale, 0 representing the patients inability to perform the task and 3 representing independence. The scores of each section are added together for a total score of 24. Any score below 19 indicates increased risk for falls. Score 24 23-19 18-15 14-10 9-5 4-1 0   Modifier CH CI CJ CK CL CM CN       Medical Necessity:   · Patient is expected to demonstrate progress in strength, balance and functional technique to improve safety during daily activities. Reason for Services/Other Comments:  · Patient continues to demonstrate capacity to improve strength, balance, gait which will increase independence and increase safety. Use of outcome tool(s) and clinical judgement create a POC that gives a: Questionable prediction of patient's progress: MODERATE COMPLEXITY            TREATMENT:   (In addition to Assessment/Re-Assessment sessions the following treatments were rendered)  Pre-treatment Symptoms/Complaints:  12/22/2017: Patient reports he is doing well. He reports he's been trying to go to the gym at his apartment to workout. Pain: Initial: Pain Intensity 1: 0  Post Session:  0     THERAPEUTIC EXERCISE: (40 minutes):  Exercises per grid below to improve mobility and strength.   Required minimal verbal and manual cues to promote proper body alignment, promote proper body posture, promote proper body mechanics and promote proper body breathing techniques. Progressed resistance, range, repetitions and complexity of movement as indicated. Date:  12/22/2017   Activity/Exercise Parameters   Treadmill 15 minutes  2.5-3.3 mph  0-2% incline  Working on UE swing and endurance   Nautilus leg press 110 pounds  20 reps  B LE    60 pounds  20 reps  L LE   Nautilus leg extension 60 pounds  20 reps  B LE    30 pounds  20 reps  L LE   Nautilus leg curl 60 pounds  20 reps  B LE    30 pounds  20 reps  L LE   Calf stretch on slant board 5 second hold  10 reps   HEP: walking with arm swing, marching, tandem stance. [employer]  Treatment/Session Assessment:    · Response to Treatment:  Patient tolerated treatment well. Patient completed all activities with minimal rest breaks which as improved since last visit. · Compliance with Program/Exercises: Will assess as treatment progresses. · Recommendations/Intent for next treatment session: \"Next visit will focus on advancements to more challenging activities\".   Total Treatment Duration:  PT Patient Time In/Time Out  Time In: 1445  Time Out: 211 Inessa Yoon, PT

## 2017-12-22 NOTE — PROGRESS NOTES
Obi Johnson  : 1955  Primary: [facility]  Secondary:  [address] at [place] [address] [facility], [address], [place] [address].  [phone]   [phone]        OUTPATIENT OCCUPATIONAL THERAPY: Daily Note 2017    ICD-10: Treatment Diagnosis: Other lack of coordination (R27.8)  Precautions/Allergies:   Review of patient's allergies indicates no known allergies. Fall Risk Score: 2 (? 5 = High Risk)  MD Orders: OT to evaluate and treat MEDICAL/REFERRING DIAGNOSIS:   Cerebral infarction, unspecified [I63.9] Right thalamic CVA  DATE OF ONSET: approximately 2 weeks ago   REFERRING PHYSICIAN: Yanet Miller MD  RETURN PHYSICIAN APPOINTMENT: unknown     INITIAL ASSESSMENT:  Mr. Princess Ronquillo presents s/p right thalamic CVA with decreased strength, coordination and range of motion of the non-dominant left upper extremity. Feel he will benefit from skilled occupational therapy to maximize functional use of the left upper extremity to complete activities of daily living independently and safely. PLAN OF CARE:   PROBLEM LIST:  1. Decreased Strength  2. Decreased ADL/Functional Activities  3. Increased Pain  4. Decreased Flexibility/Joint Mobility   5. Decreased gross and fine motor coordination of the left upper extremity  INTERVENTIONS PLANNED:  1. Activities of daily living training  2. Manual therapy training  3. Modalities  4. Neuromuscular re-eduation  5. Therapeutic activity  6. Therapeutic exercise   TREATMENT PLAN:  Effective Dates: 17 TO . Frequency/Duration: 2 times a week for 12 weeks  GOALS: (Goals have been discussed and agreed upon with patient.)  Short-Term Functional Goals: Time Frame: 6 weeks  1. Patient will demonstrate independence with home exercise program for left upper extremity range of motion, strength and coordination.   2. Patient will increase left upper extremity strength by at least one half of one manual muscle grade in order to increase use in self-care activities. 3. Patient will increase left  strength by 2-3 pounds in order to hold soap during bathing activities. Discharge Goals: Time Frame: 12 weeks  1. Patient will increase left shoulder abduction by 10-15 degrees in order to increase functional use in dressing and cooking/home management activities. 2. Patient will increase left fine motor coordination as evidenced by completion of 9-hole peg test in no more than 40 seconds in order to increase functional use in dressing tasks including buttoning and tying shoes and ties. 3. Patient will increase left  strength by at least 4-5 pounds in order to use fork and knife to cut food during meals and meal preparation. Rehabilitation Potential For Stated Goals: Good  Regarding Chloé Bravo's therapy, I certify that the treatment plan above will be carried out by a therapist or under their direction. Thank you for this referral,  Linda Burk OT     Referring Physician Signature: Jamir Jc MD _________________________  Date _________            The information in this section was collected on 11/30/17 (except where otherwise noted). OCCUPATIONAL PROFILE & HISTORY:   History of Present Injury/Illness (Reason for Referral):  Patient came to the ER approximately 2 weeks ago with symptoms of numbness on left side of face, arm and body. He returned to the hospital approximately one week later and was diagnosed with a right thalamic CVA. He and his brothers report that he has seen his PCP, Dr. Jona Rogel, as well as a teleconference/internet visit with 2 neurologists since returning home from the hospital. Patient reports pain that \"feels like electric shocks and spasms\" in his left neck, arm, trunk and leg. He and his brothers report that he has begun taking Baclofen and Valium to address the pain and spasms today.   Past Medical History/Comorbidities:   Mr. Bisi Brantley  has a past medical history of Chronic kidney disease; Hypertension; Stroke ([facility] [address] (11/2017); and Thyroid disease. Mr. Jacklyn Caban  has no past surgical history on file. Social History/Living Environment:         Patient's brother lives across the street and is able to assist him as needed at home. Prior Level of Function/Work/Activity:  Retired but waiting on next contract work in [place] with [employer]  Dominant Side:         RIGHT    Current Medications:    Current Outpatient Prescriptions:     baclofen (LIORESAL) 10 mg tablet, Take 1 Tab by mouth three (3) times daily. , Disp: 15 Tab, Rfl: 0    zolpidem (AMBIEN) 5 mg tablet, Take 1 Tab by mouth nightly as needed for Sleep. Max Daily Amount: 5 mg., Disp: 10 Tab, Rfl: 0    valsartan 320 mg tab 320 mg, hydroCHLOROthiazide 25 mg tab 25 mg, Take 1 Dose by mouth daily. , Disp: , Rfl:     levothyroxine (SYNTHROID) 75 mcg tablet, Take 75 mcg by mouth Daily (before breakfast). , Disp: , Rfl:     escitalopram oxalate (LEXAPRO) 10 mg tablet, Take 10 mg by mouth daily. , Disp: , Rfl:     dextroamphetamine-amphetamine (ADDERALL) 20 mg tablet, Take 20 mg by mouth daily. , Disp: , Rfl:             Date Last Reviewed:  12/22/2017   Complexity Level : Expanded review of therapy/medical records (1-2):  MODERATE COMPLEXITY   ASSESSMENT OF OCCUPATIONAL PERFORMANCE:   ROM:            Right upper extremity WDLs         Strength:                          Balance:                   Coordination:                   Mental Status:                   Vision:                   Activities of Daily Living:           Basic ADLs (From Assessment) Complex ADLs (From Assessment)         Grooming/Bathing/Dressing Activities of Daily Living                                      Physical Skills Involved:  1. Range of Motion  2. Strength  3. Fine Motor Control  4. Gross Motor Control  5. Pain (acute) Cognitive Skills Affected (resulting in the inability to perform in a timely and safe manner):  1.  Sustained Attention  2. Divided Attention Psychosocial Skills Affected:  1. Environmental Adaptation  2. Social Interaction  3. Emotional Regulation   Number of elements that affect the Plan of Care: 5+:  HIGH COMPLEXITY   CLINICAL DECISION MAKING:   Outcome Measure: Tool Used: Disabilities of the Arm, Shoulder and Hand (DASH) Questionnaire - Quick Version  Score:  Initial: 42/55  Most Recent: X/55 (Date: -- )   Interpretation of Score: The DASH is designed to measure the activities of daily living in person's with upper extremity dysfunction or pain. Each section is scored on a 1-5 scale, 5 representing the greatest disability. The scores of each section are added together for a total score of 55. Score 11 12-19 20-28 29-37 38-45 46-54 55   Modifier CH CI CJ CK CL CM CN     Medical Necessity:   · Patient demonstrates good rehab potential due to higher previous functional level. Reason for Services/Other Comments:  · Patient continues to require skilled intervention due to decreased safety and independence with activities of daily living. Clinical Decision-Making Assessment:  Patient is motivated to participate in therapy; progress may be limited by his painful spasms in the left upper extremity. Assessment process, impact of co-morbidities, assessment modification\need for assistance, and selection of interventions: Analytical Complexity:MODERATE COMPLEXITY   TREATMENT:   (In addition to Assessment/Re-Assessment sessions the following treatments were rendered)    Pre-treatment Symptoms/Complaints:  Patient states, \"no offense, but it is really frustrating that my arm is getting so shaky after not doing anything very strenuous. Am I really doing better? \"  Pain: Initial: Pain Intensity 1: 0  Post Session:  0/10     Therapeutic Exercise: (  20 minutes):  Exercises per grid below to improve strength and dynamic movement of arm - left to improve functional lifting, carrying, reaching and overhead activites.   Required minimal visual and verbal cues to promote proper body mechanics. Progressed resistance, range and repetitions as indicated. Date:  12/6/17 Date:  12/8/17 Date:  12/13/17 Date:  12/15/17 Date:  12/22/17   Activity/Exercise Parameters Parameters Parameters     Shoulder abduction/flexion AROM with red theraband x 20 reps       Chest pulls AROM with red theraband x 20 reps AROM with green theraband x 20 reps      Elbow flexion/extension AROM with red theraband x 20 reps AROM with green theraband x 20 reps      UBE Level 6 for 5 minutes; level 7 for 3 minutes and level 8 for 2 minutes Level 5 x 10 minutes Level 5 x 10 minutes Level 5 x 10 minutes Level 5 x 10 minutes   Hand helper 50 pounds  3 x 10 55 pounds  3 x 10 55 pounds  3 x 10 55 pounds  3 x 10 55 pounds  3 x 10   Resistive clothespin Blue to  25 foam pieces Blue to  25 foam pieces Blue to  25 foam pieces Blue to  25 foam pieces Blue to  25 foam pieces   Gentle neck AROM   All planes of motion gently x 5 reps each          Neuromuscular Re-education: (  25 minutes):  Exercise/activities per grid below to improve coordination. Required minimal visual and verbal cues to promote coordination of left, upper extremity(s) and promote motor control of left, upper extremity(s). Patient dealt a deck of cards and participated in [address] card game, using cards to change one letter of a 4-letter word to make a new word using left hand to play and hold cards. Patient used left hand to place 25 small plastic pegs pegboard with fatigue at the very end. Treatment/Session Assessment:    · Response to Treatment:  Patient is continuing to improve in left upper extremity strength and coordination. He expressed frustration at the end of his session that his arm became fatigued and \"shaky\" with what he perceives as minimal exertion. · Compliance with Program/Exercises:  Will assess as treatment progresses. · Recommendations/Intent for next treatment session: \"Next visit will focus on advancements to more challenging activities and reduction in assistance provided\".   Total Treatment Duration:  OT Patient Time In/Time Out  Time In: 1400  Time Out: Radha [address], OT

## 2017-12-29 ENCOUNTER — APPOINTMENT (OUTPATIENT)
Dept: PHYSICAL THERAPY | Age: 62
End: 2017-12-29
Attending: HOSPITALIST
Payer: COMMERCIAL

## 2018-01-03 ENCOUNTER — HOSPITAL ENCOUNTER (OUTPATIENT)
Dept: PHYSICAL THERAPY | Age: 63
Discharge: HOME | End: 2018-01-03
Attending: HOSPITALIST
Payer: MEDICARE

## 2018-01-03 PROCEDURE — 97110 THERAPEUTIC EXERCISES: CPT

## 2018-01-03 PROCEDURE — G8988 SELF CARE GOAL STATUS: HCPCS

## 2018-01-03 PROCEDURE — 97112 NEUROMUSCULAR REEDUCATION: CPT

## 2018-01-03 PROCEDURE — G8978 MOBILITY CURRENT STATUS: HCPCS

## 2018-01-03 PROCEDURE — G8979 MOBILITY GOAL STATUS: HCPCS

## 2018-01-03 PROCEDURE — G8987 SELF CARE CURRENT STATUS: HCPCS

## 2018-01-03 NOTE — PROGRESS NOTES
Al Gilmore  : 1955  [place]  Secondary:  [address]  at [facility]  [address] [place] [address], [facility] [address]  [phone]   [phone]        OUTPATIENT OCCUPATIONAL THERAPY: Daily Note 1/3/2018    ICD-10: Treatment Diagnosis: Other lack of coordination (R27.8)  Precautions/Allergies:   Review of patient's allergies indicates no known allergies. Fall Risk Score: 2 (? 5 = High Risk)  MD Orders: OT to evaluate and treat MEDICAL/REFERRING DIAGNOSIS:   Cerebral infarction, unspecified [I63.9] Right thalamic CVA  DATE OF ONSET: approximately 2 weeks ago   REFERRING PHYSICIAN: Willi Manzo MD  RETURN PHYSICIAN APPOINTMENT: unknown     INITIAL ASSESSMENT:  Mr. Jackelyn Roajs presents s/p right thalamic CVA with decreased strength, coordination and range of motion of the non-dominant left upper extremity. Feel he will benefit from skilled occupational therapy to maximize functional use of the left upper extremity to complete activities of daily living independently and safely. PLAN OF CARE:   PROBLEM LIST:  1. Decreased Strength  2. Decreased ADL/Functional Activities  3. Increased Pain  4. Decreased Flexibility/Joint Mobility   5. Decreased gross and fine motor coordination of the left upper extremity  INTERVENTIONS PLANNED:  1. Activities of daily living training  2. Manual therapy training  3. Modalities  4. Neuromuscular re-eduation  5. Therapeutic activity  6. Therapeutic exercise   TREATMENT PLAN:  Effective Dates: 17 TO . Frequency/Duration: 2 times a week for 12 weeks  GOALS: (Goals have been discussed and agreed upon with patient.)  Short-Term Functional Goals: Time Frame: 6 weeks  1. Patient will demonstrate independence with home exercise program for left upper extremity range of motion, strength and coordination.   2. Patient will increase left upper extremity strength by at least one half of one manual muscle grade in order to increase use in self-care activities. 3. Patient will increase left  strength by 2-3 pounds in order to hold soap during bathing activities. Discharge Goals: Time Frame: 12 weeks  1. Patient will increase left shoulder abduction by 10-15 degrees in order to increase functional use in dressing and cooking/home management activities. 2. Patient will increase left fine motor coordination as evidenced by completion of 9-hole peg test in no more than 40 seconds in order to increase functional use in dressing tasks including buttoning and tying shoes and ties. 3. Patient will increase left  strength by at least 4-5 pounds in order to use fork and knife to cut food during meals and meal preparation. Rehabilitation Potential For Stated Goals: Good  Regarding Camacho Bravo's therapy, I certify that the treatment plan above will be carried out by a therapist or under their direction. Thank you for this referral,  Pina Amezcua, OT     Referring Physician Signature: Shirline Boeck, MD _________________________  Date _________            The information in this section was collected on 11/30/17 (except where otherwise noted). OCCUPATIONAL PROFILE & HISTORY:   History of Present Injury/Illness (Reason for Referral):  Patient came to the ER approximately 2 weeks ago with symptoms of numbness on left side of face, arm and body. He returned to the hospital approximately one week later and was diagnosed with a right thalamic CVA. He and his brothers report that he has seen his PCP, Dr. Riri Milner, as well as a teleconference/internet visit with 2 neurologists since returning home from the hospital. Patient reports pain that \"feels like electric shocks and spasms\" in his left neck, arm, trunk and leg. He and his brothers report that he has begun taking Baclofen and Valium to address the pain and spasms today.   Past Medical History/Comorbidities:   Mr. Joshua Townsend  has a past medical history of Chronic kidney disease; Hypertension; Stroke [facility]) (11/2017); and Thyroid disease. Mr. Adela Balderas  has no past surgical history on file. Social History/Living Environment:         Patient's brother lives across the street and is able to assist him as needed at home. Prior Level of Function/Work/Activity:  Retired but waiting on next contract work in [place] with [employer]  Dominant Side:         RIGHT    Current Medications:    Current Outpatient Prescriptions:     baclofen (LIORESAL) 10 mg tablet, Take 1 Tab by mouth three (3) times daily. , Disp: 15 Tab, Rfl: 0    zolpidem (AMBIEN) 5 mg tablet, Take 1 Tab by mouth nightly as needed for Sleep. Max Daily Amount: 5 mg., Disp: 10 Tab, Rfl: 0    valsartan 320 mg tab 320 mg, hydroCHLOROthiazide 25 mg tab 25 mg, Take 1 Dose by mouth daily. , Disp: , Rfl:     levothyroxine (SYNTHROID) 75 mcg tablet, Take 75 mcg by mouth Daily (before breakfast). , Disp: , Rfl:     escitalopram oxalate (LEXAPRO) 10 mg tablet, Take 10 mg by mouth daily. , Disp: , Rfl:     dextroamphetamine-amphetamine (ADDERALL) 20 mg tablet, Take 20 mg by mouth daily. , Disp: , Rfl:             Date Last Reviewed:  1/3/2018   Complexity Level : Expanded review of therapy/medical records (1-2):  MODERATE COMPLEXITY   ASSESSMENT OF OCCUPATIONAL PERFORMANCE:   ROM:            Right upper extremity WDLs         Strength:                          Balance:                   Coordination:                   Mental Status:                   Vision:                   Activities of Daily Living:           Basic ADLs (From Assessment) Complex ADLs (From Assessment)         Grooming/Bathing/Dressing Activities of Daily Living                                      Physical Skills Involved:  1. Range of Motion  2. Strength  3. Fine Motor Control  4. Gross Motor Control  5. Pain (acute) Cognitive Skills Affected (resulting in the inability to perform in a timely and safe manner):  1. Sustained Attention  2.  Divided Attention Psychosocial Skills Affected:  1. Environmental Adaptation  2. Social Interaction  3. Emotional Regulation   Number of elements that affect the Plan of Care: 5+:  HIGH COMPLEXITY   CLINICAL DECISION MAKING:   Outcome Measure: Tool Used: Disabilities of the Arm, Shoulder and Hand (DASH) Questionnaire - Quick Version  Score:  Initial: 42/55  Most Recent: X/55 (Date: -- )   Interpretation of Score: The DASH is designed to measure the activities of daily living in person's with upper extremity dysfunction or pain. Each section is scored on a 1-5 scale, 5 representing the greatest disability. The scores of each section are added together for a total score of 55. Score 11 12-19 20-28 29-37 38-45 46-54 55   Modifier CH CI CJ CK CL CM CN     Medical Necessity:   · Patient demonstrates good rehab potential due to higher previous functional level. Reason for Services/Other Comments:  · Patient continues to require skilled intervention due to decreased safety and independence with activities of daily living. Clinical Decision-Making Assessment:  Patient is motivated to participate in therapy; progress may be limited by his painful spasms in the left upper extremity. Assessment process, impact of co-morbidities, assessment modification\need for assistance, and selection of interventions: Analytical Complexity:MODERATE COMPLEXITY   TREATMENT:   (In addition to Assessment/Re-Assessment sessions the following treatments were rendered)    Pre-treatment Symptoms/Complaints:  Patient states, \"no offense, but it is really frustrating that my arm is getting so shaky after not doing anything very strenuous. Am I really doing better? \"  Pain: Initial:    Post Session:  0/10     Therapeutic Exercise: (  20 minutes):  Exercises per grid below to improve strength and dynamic movement of arm - left to improve functional lifting, carrying, reaching and overhead activites.   Required minimal visual and verbal cues to promote proper body mechanics. Progressed resistance, range and repetitions as indicated. Date:  12/6/17 Date:  12/8/17 Date:  12/13/17 Date:  12/15/17 Date:  12/22/17   Activity/Exercise Parameters Parameters Parameters     Shoulder abduction/flexion AROM with red theraband x 20 reps       Chest pulls AROM with red theraband x 20 reps AROM with green theraband x 20 reps      Elbow flexion/extension AROM with red theraband x 20 reps AROM with green theraband x 20 reps      UBE Level 6 for 5 minutes; level 7 for 3 minutes and level 8 for 2 minutes Level 5 x 10 minutes Level 5 x 10 minutes Level 5 x 10 minutes Level 5 x 10 minutes   Hand helper 50 pounds  3 x 10 55 pounds  3 x 10 55 pounds  3 x 10 55 pounds  3 x 10 55 pounds  3 x 10   Resistive clothespin Blue to  25 foam pieces Blue to  25 foam pieces Blue to  25 foam pieces Blue to  25 foam pieces Blue to  25 foam pieces   Gentle neck AROM   All planes of motion gently x 5 reps each          Neuromuscular Re-education: (  25 minutes):  Exercise/activities per grid below to improve coordination. Required minimal visual and verbal cues to promote coordination of left, upper extremity(s) and promote motor control of left, upper extremity(s). Patient dealt a deck of cards and participated in [address] card game, using cards to change one letter of a 4-letter word to make a new word using left hand to play and hold cards. Patient used left hand to place 25 small plastic pegs pegboard with fatigue at the very end. Treatment/Session Assessment:    · Response to Treatment:  Patient is continuing to improve in left upper extremity strength and coordination. He expressed frustration at the end of his session that his arm became fatigued and \"shaky\" with what he perceives as minimal exertion. · Compliance with Program/Exercises: Will assess as treatment progresses.   · Recommendations/Intent for next treatment session: \"Next visit will focus on advancements to more challenging activities and reduction in assistance provided\".   Total Treatment Duration:       Roddy Esposito OT

## 2018-01-03 NOTE — THERAPY RECERTIFICATION
Praveena Yen  : 1955  [place]  Secondary:  [address] [facility] [address] [address], [address]  [phone]   [phone]           OUTPATIENT PHYSICAL THERAPY:Daily Note and Recertification 3338    ICD-10: Treatment Diagnosis: Other abnormalities of gait and mobility R26.89  Precautions/Allergies:   Review of patient's allergies indicates no known allergies. Fall Risk Score: 1 (? 5 = High Risk)  MD Orders: eval and treat MEDICAL/REFERRING DIAGNOSIS:  . Cerebral infarction, unspecified [I63.9] Right thalamic CVA  DATE OF ONSET: 2017  REFERRING PHYSICIAN: Emani Rhoades MD  RETURN PHYSICIAN APPOINTMENT:      RECERTIFICATION 6/3/26:  Mr. Antelmo Bangura has attended 4 PT sessions from 17 to 1/3/18 for decreased balance and gait, and decreased coordination/proprioception L LE. .  Treatment has consisted of balance, coordination, and strengthening to improve overall safety, mobility, and performance with activities of daily living. Patient would benefit from PT to address these problems to improve patient's independence and safety with mobility and daily activities. Thank you. PROBLEM LIST (Impacting functional limitations):  1. Decreased Strength  2. Decreased ADL/Functional Activities  3. Decreased Transfer Abilities  4. Decreased Ambulation Ability/Technique  5. Decreased Balance  6. Decreased Activity Tolerance  7. Decreased [place] with Home Exercise Program INTERVENTIONS PLANNED:  1. Balance Exercise  2. Gait Training  3. Home Exercise Program (HEP)  4. Neuromuscular Re-education/Strengthening  5. Therapeutic Activites  6. Therapeutic Exercise/Strengthening  7. Transfer Training   8. TREATMENT PLAN:  Effective Dates: 2017 TO 3/2/2018.   Frequency/Duration: 1-2 times a week for 8-12 weeks  GOALS: (Goals have been discussed and agreed upon with patient.)  Short-Term Functional Goals: Time Frame: 2-4 weeks  1. Patient will demonstrate independence and compliance with home exercise program to improve balance and strength for daily activities. MET  2. Patient will increase his score on the Bhagat Balance Scale to greater than or equal to 53/56 indicating improved safety and decreased fall risk for daily activities. MET  Discharge Goals: Time Frame: 8-12 weeks  1. Patient will increase his score on the Bhagat Balance Scale to greater than or equal to 54/56 indicating improved safety and decreased fall risk for daily activities. Progressing and ongoing  2. Patient will increase his score on the dynamic gait index to greater than or equal to 23/24 indicating improved balance and safety for community ambulation. Progressing and ongoing  3. Patient will ambulate with a normal gait pattern over level and unlevel surfaces without evidence of imbalance to improve safety for daily activities. Progressing and ongoing  4. Patient will demonstrate improved score on six minute walk test with stable vital signs indicating improved endurance and mobility for daily activities. New Goal  5. Patient will be independent in discharge HEP to continue to work on balance, strength, and endurance. New Goal  Rehabilitation Potential For Stated Goals: Good    Regarding Catrina Bravo's therapy, I certify that the treatment plan above will be carried out by a therapist or under their direction. Thank you for this referral,  Samy Alexander PT     Referring Physician Signature: Michael Franklin MD          Date                      The information in this section was collected on 12/8/17 (except where otherwise noted). HISTORY:   History of Present Injury/Illness (Reason for Referral):  Taking baclofen and valium for L UE pain, improved. Numbness L UE. Moving slower especially with position changes. No dizziness. No falls. No change in vision.    Past Medical History/Comorbidities:   Mr. Radames Madsen  has a past medical history of Chronic kidney disease; Hypertension; Stroke [facility]) (11/2017); and Thyroid disease. Mr. Frank oRdrigez  has no past surgical history on file. Social History/Living Environment:     , was getting ready to retire. Prior Level of Function/Work/Activity:  independent  Dominant Side:         RIGHT  Previous Treatment Approaches:          OT  Personal Factors:          Sex:  male        Age:  57 y.o. Current Medications:       Current Outpatient Prescriptions:     baclofen (LIORESAL) 10 mg tablet, Take 1 Tab by mouth three (3) times daily. , Disp: 15 Tab, Rfl: 0    zolpidem (AMBIEN) 5 mg tablet, Take 1 Tab by mouth nightly as needed for Sleep. Max Daily Amount: 5 mg., Disp: 10 Tab, Rfl: 0    valsartan 320 mg tab 320 mg, hydroCHLOROthiazide 25 mg tab 25 mg, Take 1 Dose by mouth daily. , Disp: , Rfl:     levothyroxine (SYNTHROID) 75 mcg tablet, Take 75 mcg by mouth Daily (before breakfast). , Disp: , Rfl:     escitalopram oxalate (LEXAPRO) 10 mg tablet, Take 10 mg by mouth daily. , Disp: , Rfl:     dextroamphetamine-amphetamine (ADDERALL) 20 mg tablet, Take 20 mg by mouth daily. , Disp: , Rfl:    Date Last Reviewed:  1/3/2018    Number of Personal Factors/Comorbidities that affect the Plan of Care: 1-2: MODERATE COMPLEXITY   EXAMINATION:   Observation/Orthostatic Postural Assessment:          WNL  Strength:          4+/5  Functional Mobility:         Gait/Ambulation:  No AD, decreased arm swing L, mild path deviations with head movements or multitasking.           Transfers:  independent        Bed Mobility:  Slowly, independently        Stairs:  With rail, step over step, decreased coordination L LE  Sensation:         Decreased sensation L LE  Postural Control & Balance:  · Bhagat Balance Scale:  53/56.   (A score less than 45/56 indicates high risk of falls)   Score at initial evaluation= 51/56  · Dynamic Gait Index:  21/24.   (A score less than or equal to19/24 is abnormal and predictive of falls) Score at initial evaluation= 18/24   Body Structures Involved:  1. Nerves  2. Eyes and Ears  3. Muscles Body Functions Affected:  1. Sensory/Pain  2. Neuromusculoskeletal  3. Movement Related Activities and Participation Affected:  1. General Tasks and Demands  2. Mobility  3. Domestic Life  4. Community, Social and [place]   Number of elements (examined above) that affect the Plan of Care: 3: MODERATE COMPLEXITY   CLINICAL PRESENTATION:   Presentation: Evolving clinical presentation with changing clinical characteristics: MODERATE COMPLEXITY   CLINICAL DECISION MAKING:   Outcome Measure: Tool Used: Bhagat Balance Scale  Score:  Initial: 51/56 Most Recent: 53/56 (Date: 1/3/18 )   Interpretation of Score: Each section is scored on a 0-4 scale, 0 representing the patients inability to perform the task and 4 representing independence. The scores of each section are added together for a total score of 56. The higher the patients score, the more independent the patient is. Any score below 45 indicates increased risk for falls. Score 56 55-45 44-34 33-23 22-12 11-1 0   Modifier CH CI CJ CK CL CM CN     Tool Used: Dynamic Gait Index  Score:  Initial: 18/24 Most Recent: 21/24 (Date: 1/3/18 )   Interpretation of Score: Each section is scored on a 0-3 scale, 0 representing the patients inability to perform the task and 3 representing independence. The scores of each section are added together for a total score of 24. Any score below 19 indicates increased risk for falls.   Score 24 23-19 18-15 14-10 9-5 4-1 0   Modifier CH CI CJ CK CL CM CN     Tool Used: 6-MINUTE WALK TEST  Score:  Initial: 1350 feet Most Recent: X feet (Date: -- )   Interpretation of Score: Normal range varies but is approximately 4280-2027 Feet      Distance walked: 1350 feet     Baseline End of Test   Heart Rate 86bpm 97bpm   Dyspnea (Pj Scale)     Fatigue (Pj Scale)     SpO2 99% 98%   BP       Score 2133 8442-1697 7375-1239 1320-653 852-427 426-16 15-0   Modifier CH CI CJ CK CL CM CN     ? Mobility - Walking and Moving Around:     - CURRENT STATUS: CJ - 20%-39% impaired, limited or restricted    - GOAL STATUS: CI - 1%-19% impaired, limited or restricted    - D/C STATUS:  ---------------To be determined---------------    Medical Necessity:   · Patient is expected to demonstrate progress in strength, balance and functional technique to improve safety during daily activities. Reason for Services/Other Comments:  · Patient continues to demonstrate capacity to improve strength, balance, gait which will increase independence and increase safety. Use of outcome tool(s) and clinical judgement create a POC that gives a: Questionable prediction of patient's progress: MODERATE COMPLEXITY            TREATMENT:   (In addition to Assessment/Re-Assessment sessions the following treatments were rendered)  Pre-treatment Symptoms/Complaints:  1/3/2018: no falls. Still more concerned with L UE   Pain: Initial: Pain Intensity 1: 0  Post Session:  0      Neuromuscular Re-education ( 15 minutes):  Exercise/activities per grid below to improve balance, coordination, kinesthetic sense, posture and proprioception. Required minimal verbal cues to promote static and dynamic balance in standing.   Balance/Vestibular Treatment:   Activity   Date  1/3/18 Date Date Date Date Date   Activity/Exercise   Sets/reps/equipment Sets/reps/  equipment Sets/reps/  equipment Sets/reps/  equipment Sets/reps/  equipment Sets/reps/  equipment   Walking with head turns     2 laps in hallway        Walking with head up & down     2 laps in hallway        Step ups             Step taps     6 inch step x 20 reps        Marching           Sidestepping           Crossovers           [place]           Walking  backwards             Tandem walking           Weaving in/out of cones             Picking up cones             Sports cord             [employer] ball Figure 8s            Circles right/left           Walking with 360 degree turns           Spirals           Weight shifting:    Left & Right             Weight shifting: Forward & Backward              Static Standing Balance             Standing with feet apart     Eyes open and closed        Standing with feet together     Eyes open        Standing with feet semitandem   Eyes open        Standing with feet tandem   Eyes open        Single leg stance   Eyes open, 5 sec        X1/X2 Viewing exercises             Hallpike-[place] testing for BPPV (Benign Paroxysmal Positional Vertigo)             Rivera-Daroff exercises           Canalith Repositioning treatment/Epley Maneuver  for BPPV (Benign Paroxysmal Positional Vertigo)           Smart Equ[employer] Training: See scanned report. Therapeutic Exercise: ( 25 minutes):  Exercises per grid below to improve mobility, strength and balance. Required minimal verbal cues to promote proper body alignment, promote proper body posture and promote proper body mechanics. Progressed resistance, range, repetitions and complexity of movement as indicated. Date:  1/3/18   Activity/Exercise Parameters   Treadmill Walked in hallway x 6 minutes   Nautilus leg press 110 pounds  20 reps  B LE    110 pounds  20 reps  L LE   Nautilus leg extension 60 pounds  20 reps  B LE    35 pounds  20 reps  L LE   Nautilus leg curl 60 pounds  20 reps  B LE    35 pounds  20 reps  L LE   Calf stretch on slant board 5 second hold  10 reps   HEP: walking with arm swing, marching, tandem stance. [employer]  Treatment/Session Assessment:    · Response to Treatment:  Patient tolerated treatment well. Continue to work on walking, endurance, strength, coordination/agility, using UEs with LEs. · Compliance with Program/Exercises: compliant  · Recommendations/Intent for next treatment session: \"Next visit will focus on advancements to more challenging activities\".   Total Treatment Duration:  PT Patient Time In/Time Out  Time In: 1115  Time Out: [address] Cori Tran PT

## 2018-01-03 NOTE — THERAPY RECERTIFICATION
Rodolfo Kasper  : 1955  [place]  Secondary:  [address] at [facility]  [address], [address], [address] [facility]  [phone]   [phone]        OUTPATIENT OCCUPATIONAL THERAPY: Daily Note and Recertification 9094    ICD-10: Treatment Diagnosis: Other lack of coordination (R27.8)  Precautions/Allergies:   Review of patient's allergies indicates no known allergies. Fall Risk Score: 2 (? 5 = High Risk)  MD Orders: OT to evaluate and treat MEDICAL/REFERRING DIAGNOSIS:   Cerebral infarction, unspecified [I63.9] Right thalamic CVA  DATE OF ONSET: approximately 2 weeks ago   REFERRING PHYSICIAN: Mukul Freedman MD  RETURN PHYSICIAN APPOINTMENT: unknown   1/3/18: Mr. Madhavi Olea is demonstrating improving range of motion and functional use of the left upper extremity. He continues to struggle with maintaining visual attention to the left upper extremity and impaired proprioception that is affecting his ability to maintain functional grasp on items such as dishes when loading and unloading the . Feel he will continue to benefit from skilled occupational therapy to maximize functional safety and independence with activities of daily living. INITIAL ASSESSMENT:  Mr. Madhavi Olea presents s/p right thalamic CVA with decreased strength, coordination and range of motion of the non-dominant left upper extremity. Feel he will benefit from skilled occupational therapy to maximize functional use of the left upper extremity to complete activities of daily living independently and safely. PLAN OF CARE:   PROBLEM LIST:  1. Decreased Strength  2. Decreased ADL/Functional Activities  3. Increased Pain  4. Decreased Flexibility/Joint Mobility   5. Decreased gross and fine motor coordination of the left upper extremity  INTERVENTIONS PLANNED:  1. Activities of daily living training  2. Manual therapy training  3. Modalities  4.  Neuromuscular re-eduation  5. Therapeutic activity  6. Therapeutic exercise   TREATMENT PLAN:  Effective Dates: 11/30/17 TO 2/30/18. Frequency/Duration: 2 times a week for 12 weeks  GOALS: (Goals have been discussed and agreed upon with patient.)  Short-Term Functional Goals: Time Frame: 6 weeks  1. Patient will demonstrate independence with home exercise program for left upper extremity range of motion, strength and coordination. Met  2. Patient will increase left upper extremity strength by at least one half of one manual muscle grade in order to increase use in self-care activities. Continue to address  3. Patient will increase left  strength by 2-3 pounds in order to hold soap during bathing activities. Continue to address  Discharge Goals: Time Frame: 12 weeks  1. Patient will increase left shoulder abduction by 10-15 degrees in order to increase functional use in dressing and cooking/home management activities. Met  2. Patient will increase left fine motor coordination as evidenced by completion of 9-hole peg test in no more than 40 seconds in order to increase functional use in dressing tasks including buttoning and tying shoes and ties. Continue to address  3. Patient will increase left  strength by at least 4-5 pounds in order to use fork and knife to cut food during meals and meal preparation. Continue to address  Rehabilitation Potential For Stated Goals: Good  Regarding Loly Bravo's therapy, I certify that the treatment plan above will be carried out by a therapist or under their direction. Thank you for this referral,  Radha Vegas, OT     Referring Physician Signature: Mukul Freedman MD _________________________  Date _________            The information in this section was collected on 1/3/18 (except where otherwise noted).   OCCUPATIONAL PROFILE & HISTORY:   History of Present Injury/Illness (Reason for Referral):  Patient came to the ER approximately 2 weeks ago with symptoms of numbness on left side of face, arm and body. He returned to the hospital approximately one week later and was diagnosed with a right thalamic CVA. He and his brothers report that he has seen his PCP, Dr. Milton Angelo, as well as a teleconference/internet visit with 2 neurologists since returning home from the hospital. Patient reports pain that \"feels like electric shocks and spasms\" in his left neck, arm, trunk and leg. He and his brothers report that he has begun taking Baclofen and Valium to address the pain and spasms today. Past Medical History/Comorbidities:   Mr. Barbara Lopez  has a past medical history of Chronic kidney disease; Hypertension; Stroke [facility]) (11/2017); and Thyroid disease. Mr. Barbara Lopez  has no past surgical history on file. Social History/Living Environment:         Patient's brother lives across the street and is able to assist him as needed at home. Prior Level of Function/Work/Activity:  Retired but waiting on next contract work in [place] with [employer]  Dominant Side:         RIGHT    Current Medications:    Current Outpatient Prescriptions:     baclofen (LIORESAL) 10 mg tablet, Take 1 Tab by mouth three (3) times daily. , Disp: 15 Tab, Rfl: 0    zolpidem (AMBIEN) 5 mg tablet, Take 1 Tab by mouth nightly as needed for Sleep. Max Daily Amount: 5 mg., Disp: 10 Tab, Rfl: 0    valsartan 320 mg tab 320 mg, hydroCHLOROthiazide 25 mg tab 25 mg, Take 1 Dose by mouth daily. , Disp: , Rfl:     levothyroxine (SYNTHROID) 75 mcg tablet, Take 75 mcg by mouth Daily (before breakfast). , Disp: , Rfl:     escitalopram oxalate (LEXAPRO) 10 mg tablet, Take 10 mg by mouth daily. , Disp: , Rfl:     dextroamphetamine-amphetamine (ADDERALL) 20 mg tablet, Take 20 mg by mouth daily. , Disp: , Rfl:             Date Last Reviewed:  1/3/2018   Complexity Level : Expanded review of therapy/medical records (1-2):  MODERATE COMPLEXITY   ASSESSMENT OF OCCUPATIONAL PERFORMANCE:   ROM:            Right upper extremity WDLs   LUE AROM  L Shoulder ABduction: 150     Strength:                          Balance:             Sitting: Intact  Standing: Intact     Coordination:             Fine Motor Skills-Upper: Right Intact, Left Impaired  Gross Motor Skills-Upper: Right Intact, Left Impaired     Mental Status:             Neurologic State: Alert  Orientation Level: Oriented X4  Cognition: Decreased attention/concentration, Follows commands  Perception: Appears intact  Perseveration: No perseveration noted  Safety/Judgement: Insight into deficits     Vision:             Tracking: Able to track stimulus in all quadrants w/o difficulty  Acuity: Able to read clock/calendar on wall without difficulty  Corrective Lenses: Glasses     Activities of Daily Living:           Basic ADLs (From Assessment) Complex ADLs (From Assessment)   Basic ADL  Feeding: Independent  Oral Facial Hygiene/Grooming: Independent  Bathing: Independent  Upper Body Dressing: Modified independent, Additional time  Lower Body Dressing: Additional time, Modified independent  Toileting: Independent Instrumental ADL  Meal Preparation: Moderate assistance  Homemaking: Moderate assistance  Medication Management: Minimum assistance  Financial Management: Supervision   Grooming/Bathing/Dressing Activities of Daily Living     Cognitive Retraining  Safety/Judgement: Insight into deficits                 Functional Transfers  Toilet Transfer : Independent  Tub Transfer: Supervision     Bed/Mat Mobility  Rolling: Independent  Supine to Sit: Independent  Sit to Supine: Independent  Sit to Stand: Independent  Bed to Chair: Independent  Scooting: Independent        Physical Skills Involved:  1. Range of Motion  2. Strength  3. Fine Motor Control  4. Gross Motor Control  5. Pain (acute) Cognitive Skills Affected (resulting in the inability to perform in a timely and safe manner):  1. Sustained Attention  2. Divided Attention Psychosocial Skills Affected:  1. Environmental Adaptation  2.  Social Interaction  3. Emotional Regulation   Number of elements that affect the Plan of Care: 5+:  HIGH COMPLEXITY   CLINICAL DECISION MAKING:   Outcome Measure: Tool Used: Disabilities of the Arm, Shoulder and Hand (DASH) Questionnaire - Quick Version  Score:  Initial: 42/55  Most Recent: X/55 (Date: -- )   Interpretation of Score: The DASH is designed to measure the activities of daily living in person's with upper extremity dysfunction or pain. Each section is scored on a 1-5 scale, 5 representing the greatest disability. The scores of each section are added together for a total score of 55. Score 11 12-19 20-28 29-37 38-45 46-54 55   Modifier CH CI CJ CK CL CM CN ? Self Care:     - CURRENT STATUS: CL - 60%-79% impaired, limited or restricted    - GOAL STATUS: CJ - 20%-39% impaired, limited or restricted    - D/C STATUS:  ---------------To be determined---------------  Medical Necessity:   · Patient demonstrates good rehab potential due to higher previous functional level. Reason for Services/Other Comments:  · Patient continues to require skilled intervention due to decreased safety and independence with activities of daily living. Clinical Decision-Making Assessment:  Patient is motivated to participate in therapy; progress may be limited by his painful spasms in the left upper extremity. Assessment process, impact of co-morbidities, assessment modification\need for assistance, and selection of interventions: Analytical Complexity:MODERATE COMPLEXITY   TREATMENT:   (In addition to Assessment/Re-Assessment sessions the following treatments were rendered)    Pre-treatment Symptoms/Complaints:  Patient states, \"I am feeling more confident about my left hand, but I have broken some dishes because I think I have a good hold on them but I don't. \"  Pain: Initial: Pain Intensity 1: 0  Post Session:  0/10     Therapeutic Exercise: (  20 minutes):  Exercises per grid below to improve strength and dynamic movement of arm - left to improve functional lifting, carrying, reaching and overhead activites. Required minimal visual and verbal cues to promote proper body mechanics. Progressed resistance, range and repetitions as indicated. Date:  12/6/17 Date:  12/8/17 Date:  12/13/17 Date:  12/15/17 Date:  12/22/17 Date:  1/3/18   Activity/Exercise Parameters Parameters Parameters      Shoulder abduction/flexion AROM with red theraband x 20 reps        Chest pulls AROM with red theraband x 20 reps AROM with green theraband x 20 reps       Elbow flexion/extension AROM with red theraband x 20 reps AROM with green theraband x 20 reps       UBE Level 6 for 5 minutes; level 7 for 3 minutes and level 8 for 2 minutes Level 5 x 10 minutes Level 5 x 10 minutes Level 5 x 10 minutes Level 5 x 10 minutes Level 5 x 10 minutes   Hand helper 50 pounds  3 x 10 55 pounds  3 x 10 55 pounds  3 x 10 55 pounds  3 x 10 55 pounds  3 x 10 55 pounds  3 x 10  With arm overhead   Resistive clothespin Blue to  25 foam pieces Blue to  25 foam pieces Blue to  25 foam pieces Blue to  25 foam pieces Blue to  25 foam pieces Black to  25 foam pieces and place them in cup on elevated surface   Gentle neck AROM   All planes of motion gently x 5 reps each           Neuromuscular Re-education: (  25 minutes):  Exercise/activities per grid below to improve coordination. Required minimal visual and verbal cues to promote coordination of left, upper extremity(s) and promote motor control of left, upper extremity(s). Patient dealt a deck of cards and participated in [employer] in the [address] [place] [address] Ganji card game, using left hand to deal and play cards on up to 8 columns of cards in descending order and alternating color to challenge divided visual attention.      Patient picked up cups of water with left hand and placed them on marked spots as accurately as possible to work on motor control and proprioception of the left upper extremity during a functional activity. He then practiced bouncing and catching a tennis ball on the table top x 20 then on the floor x 20; noted this was difficult but successfully completed at least 75%. Tennis ball issued for HEP for motor control and proprioception. Patient educated to use visual attention to objects when he is holding, carrying, and lifting them to improve motor control; patient verbalized understanding. Treatment/Session Assessment:    · Response to Treatment:  Patient is continuing to improve in left upper extremity strength and coordination. · Compliance with Program/Exercises: Will assess as treatment progresses. · Recommendations/Intent for next treatment session: \"Next visit will focus on advancements to more challenging activities and reduction in assistance provided\".   Total Treatment Duration:  OT Patient Time In/Time Out  Time In: 1030  Time Out: [address] [facility], OT

## 2018-01-05 ENCOUNTER — HOSPITAL ENCOUNTER (OUTPATIENT)
Dept: PHYSICAL THERAPY | Age: 63
Discharge: HOME | End: 2018-01-05
Attending: HOSPITALIST
Payer: MEDICARE

## 2018-01-05 PROCEDURE — 97110 THERAPEUTIC EXERCISES: CPT

## 2018-01-05 NOTE — THERAPY RECERTIFICATION
Clive Model  : 1955  [place]  Secondary:  [address]  at [place] [id number] [facility], [address] [place] [address],[address], [facility] [address]  [phone]   [phone]        OUTPATIENT OCCUPATIONAL THERAPY: Daily Note 2018    ICD-10: Treatment Diagnosis: Other lack of coordination (R27.8)  Precautions/Allergies:   Review of patient's allergies indicates no known allergies. Fall Risk Score: 2 (? 5 = High Risk)  MD Orders: OT to evaluate and treat MEDICAL/REFERRING DIAGNOSIS:   Cerebral infarction, unspecified [I63.9] Right thalamic CVA  DATE OF ONSET: approximately 2 weeks ago   REFERRING PHYSICIAN: Caleb Sheomaker MD  RETURN PHYSICIAN APPOINTMENT: unknown   1/3/18: Mr. Adiel West is demonstrating improving range of motion and functional use of the left upper extremity. He continues to struggle with maintaining visual attention to the left upper extremity and impaired proprioception that is affecting his ability to maintain functional grasp on items such as dishes when loading and unloading the . Feel he will continue to benefit from skilled occupational therapy to maximize functional safety and independence with activities of daily living. INITIAL ASSESSMENT:  Mr. Adiel West presents s/p right thalamic CVA with decreased strength, coordination and range of motion of the non-dominant left upper extremity. Feel he will benefit from skilled occupational therapy to maximize functional use of the left upper extremity to complete activities of daily living independently and safely. PLAN OF CARE:   PROBLEM LIST:  1. Decreased Strength  2. Decreased ADL/Functional Activities  3. Increased Pain  4. Decreased Flexibility/Joint Mobility   5. Decreased gross and fine motor coordination of the left upper extremity  INTERVENTIONS PLANNED:  1. Activities of daily living training  2. Manual therapy training  3. Modalities  4.  Neuromuscular re-eduation  5. Therapeutic activity  6. Therapeutic exercise   TREATMENT PLAN:  Effective Dates: 11/30/17 TO 2/30/18. Frequency/Duration: 2 times a week for 12 weeks  GOALS: (Goals have been discussed and agreed upon with patient.)  Short-Term Functional Goals: Time Frame: 6 weeks  1. Patient will demonstrate independence with home exercise program for left upper extremity range of motion, strength and coordination. Met  2. Patient will increase left upper extremity strength by at least one half of one manual muscle grade in order to increase use in self-care activities. Continue to address  3. Patient will increase left  strength by 2-3 pounds in order to hold soap during bathing activities. Continue to address  Discharge Goals: Time Frame: 12 weeks  1. Patient will increase left shoulder abduction by 10-15 degrees in order to increase functional use in dressing and cooking/home management activities. Met  2. Patient will increase left fine motor coordination as evidenced by completion of 9-hole peg test in no more than 40 seconds in order to increase functional use in dressing tasks including buttoning and tying shoes and ties. Continue to address  3. Patient will increase left  strength by at least 4-5 pounds in order to use fork and knife to cut food during meals and meal preparation. Continue to address  Rehabilitation Potential For Stated Goals: Good  Regarding Faisal Bravo's therapy, I certify that the treatment plan above will be carried out by a therapist or under their direction. Thank you for this referral,  [place] OT     Referring Physician Signature: Kassi Randhawa MD _________________________  Date _________            The information in this section was collected on 1/3/18 (except where otherwise noted).   OCCUPATIONAL PROFILE & HISTORY:   History of Present Injury/Illness (Reason for Referral):  Patient came to the ER approximately 2 weeks ago with symptoms of numbness on left side of face, arm and body. He returned to the hospital approximately one week later and was diagnosed with a right thalamic CVA. He and his brothers report that he has seen his PCP, Dr. Milton Angelo, as well as a teleconference/internet visit with 2 neurologists since returning home from the hospital. Patient reports pain that \"feels like electric shocks and spasms\" in his left neck, arm, trunk and leg. He and his brothers report that he has begun taking Baclofen and Valium to address the pain and spasms today. Past Medical History/Comorbidities:   Mr. Barbara Lopez  has a past medical history of Chronic kidney disease; Hypertension; Stroke [facility]) (11/2017); and Thyroid disease. Mr. Barbara Lopez  has no past surgical history on file. Social History/Living Environment:         Patient's brother lives across the street and is able to assist him as needed at home. Prior Level of Function/Work/Activity:  Retired but waiting on next contract work in [place]  with [employer]  Dominant Side:         RIGHT    Current Medications:    Current Outpatient Prescriptions:     baclofen (LIORESAL) 10 mg tablet, Take 1 Tab by mouth three (3) times daily. , Disp: 15 Tab, Rfl: 0    zolpidem (AMBIEN) 5 mg tablet, Take 1 Tab by mouth nightly as needed for Sleep. Max Daily Amount: 5 mg., Disp: 10 Tab, Rfl: 0    valsartan 320 mg tab 320 mg, hydroCHLOROthiazide 25 mg tab 25 mg, Take 1 Dose by mouth daily. , Disp: , Rfl:     levothyroxine (SYNTHROID) 75 mcg tablet, Take 75 mcg by mouth Daily (before breakfast). , Disp: , Rfl:     escitalopram oxalate (LEXAPRO) 10 mg tablet, Take 10 mg by mouth daily. , Disp: , Rfl:     dextroamphetamine-amphetamine (ADDERALL) 20 mg tablet, Take 20 mg by mouth daily. , Disp: , Rfl:             Date Last Reviewed:  1/5/2018   Complexity Level : Expanded review of therapy/medical records (1-2):  MODERATE COMPLEXITY   ASSESSMENT OF OCCUPATIONAL PERFORMANCE:   ROM:            Right upper extremity WDLs Strength:                          Balance:                   Coordination:                   Mental Status:                   Vision:                   Activities of Daily Living:           Basic ADLs (From Assessment) Complex ADLs (From Assessment)         Grooming/Bathing/Dressing Activities of Daily Living                                      Physical Skills Involved:  1. Range of Motion  2. Strength  3. Fine Motor Control  4. Gross Motor Control  5. Pain (acute) Cognitive Skills Affected (resulting in the inability to perform in a timely and safe manner):  1. Sustained Attention  2. Divided Attention Psychosocial Skills Affected:  1. Environmental Adaptation  2. Social Interaction  3. Emotional Regulation   Number of elements that affect the Plan of Care: 5+:  HIGH COMPLEXITY   CLINICAL DECISION MAKING:   Outcome Measure: Tool Used: Disabilities of the Arm, Shoulder and Hand (DASH) Questionnaire - Quick Version  Score:  Initial: 42/55  Most Recent: X/55 (Date: -- )   Interpretation of Score: The DASH is designed to measure the activities of daily living in person's with upper extremity dysfunction or pain. Each section is scored on a 1-5 scale, 5 representing the greatest disability. The scores of each section are added together for a total score of 55. Score 11 12-19 20-28 29-37 38-45 46-54 55   Modifier CH CI CJ CK CL CM CN ? Self Care:     - CURRENT STATUS: CL - 60%-79% impaired, limited or restricted    - GOAL STATUS: CJ - 20%-39% impaired, limited or restricted    - D/C STATUS:  ---------------To be determined---------------  Medical Necessity:   · Patient demonstrates good rehab potential due to higher previous functional level. Reason for Services/Other Comments:  · Patient continues to require skilled intervention due to decreased safety and independence with activities of daily living.   Clinical Decision-Making Assessment:  Patient is motivated to participate in therapy; progress may be limited by his painful spasms in the left upper extremity. Assessment process, impact of co-morbidities, assessment modification\need for assistance, and selection of interventions: Analytical Complexity:MODERATE COMPLEXITY   TREATMENT:   (In addition to Assessment/Re-Assessment sessions the following treatments were rendered)    Pre-treatment Symptoms/Complaints:  Patient states, \"I just don't feel well today; I am sorry. \"  Pain: Initial: Pain Intensity 1: 0  Post Session:  0/10     Therapeutic Exercise: (  15 minutes):  Exercises per grid below to improve strength and dynamic movement of arm - left to improve functional lifting, carrying, reaching and overhead activites. Required minimal visual and verbal cues to promote proper body mechanics. Progressed resistance, range and repetitions as indicated.      Date:  12/6/17 Date:  12/8/17 Date:  12/13/17 Date:  12/15/17 Date:  12/22/17 Date:  1/3/18 Date:  1/5/18   Activity/Exercise Parameters Parameters Parameters       Shoulder abduction/flexion AROM with red theraband x 20 reps         Chest pulls AROM with red theraband x 20 reps AROM with green theraband x 20 reps        Elbow flexion/extension AROM with red theraband x 20 reps AROM with green theraband x 20 reps        UBE Level 6 for 5 minutes; level 7 for 3 minutes and level 8 for 2 minutes Level 5 x 10 minutes Level 5 x 10 minutes Level 5 x 10 minutes Level 5 x 10 minutes Level 5 x 10 minutes Level 4.8 x 10 minutes   Hand helper 50 pounds  3 x 10 55 pounds  3 x 10 55 pounds  3 x 10 55 pounds  3 x 10 55 pounds  3 x 10 55 pounds  3 x 10  With arm overhead 55 pounds  3 x 10  With arm overhead   Resistive clothespin Blue to  25 foam pieces Blue to  25 foam pieces Blue to  25 foam pieces Blue to  25 foam pieces Blue to  25 foam pieces Black to  25 foam pieces and place them in cup on elevated surface    Gentle neck AROM   All planes of motion gently x 5 reps each            Treatment/Session Assessment:    · Response to Treatment:  Patient reported not feeling well after the first 15 minutes of treatment today. He called his brother to come and pick him up early; he was agreeable to call his physician as needed. · Compliance with Program/Exercises: Will assess as treatment progresses. · Recommendations/Intent for next treatment session: \"Next visit will focus on advancements to more challenging activities and reduction in assistance provided\".   Total Treatment Duration:  OT Patient Time In/Time Out  Time In: 1030  Time Out: Mabel Hernandez

## 2018-01-10 ENCOUNTER — HOSPITAL ENCOUNTER (OUTPATIENT)
Dept: PHYSICAL THERAPY | Age: 63
Discharge: HOME | End: 2018-01-10
Attending: HOSPITALIST
Payer: MEDICARE

## 2018-01-10 PROCEDURE — 97110 THERAPEUTIC EXERCISES: CPT

## 2018-01-10 PROCEDURE — 97112 NEUROMUSCULAR REEDUCATION: CPT

## 2018-01-10 NOTE — PROGRESS NOTES
Julito Blackburn  : 1955  [place]  Secondary:  [address] at [facility]  [address], [address] [address],[address], [address] [facility]  [phone]   [phone]           OUTPATIENT PHYSICAL THERAPY:Daily Note 1/10/2018    ICD-10: Treatment Diagnosis: Other abnormalities of gait and mobility R26.89  Precautions/Allergies:   Review of patient's allergies indicates no known allergies. Fall Risk Score: 1 (? 5 = High Risk)  MD Orders: eval and treat MEDICAL/REFERRING DIAGNOSIS:  . Cerebral infarction, unspecified [I63.9] Right thalamic CVA  DATE OF ONSET: 2017  REFERRING PHYSICIAN: Marianela Lazo MD  RETURN PHYSICIAN APPOINTMENT:      RECERTIFICATION :  Mr. Bishop Sahu has attended 4 PT sessions from 17 to 1/3/18 for decreased balance and gait, and decreased coordination/proprioception L LE. .  Treatment has consisted of balance, coordination, and strengthening to improve overall safety, mobility, and performance with activities of daily living. Patient would benefit from PT to address these problems to improve patient's independence and safety with mobility and daily activities. Thank you. PROBLEM LIST (Impacting functional limitations):  1. Decreased Strength  2. Decreased ADL/Functional Activities  3. Decreased Transfer Abilities  4. Decreased Ambulation Ability/Technique  5. Decreased Balance  6. Decreased Activity Tolerance  7. Decreased [place] with Home Exercise Program INTERVENTIONS PLANNED:  1. Balance Exercise  2. Gait Training  3. Home Exercise Program (HEP)  4. Neuromuscular Re-education/Strengthening  5. Therapeutic Activites  6. Therapeutic Exercise/Strengthening  7. Transfer Training   8. TREATMENT PLAN:  Effective Dates: 2017 TO 3/2/2018.   Frequency/Duration: 1-2 times a week for 8-12 weeks  GOALS: (Goals have been discussed and agreed upon with patient.)  Short-Term Functional Goals: Time Frame: 2-4 weeks  1. Patient will demonstrate independence and compliance with home exercise program to improve balance and strength for daily activities. MET  2. Patient will increase his score on the Bhagat Balance Scale to greater than or equal to 53/56 indicating improved safety and decreased fall risk for daily activities. MET  Discharge Goals: Time Frame: 8-12 weeks  1. Patient will increase his score on the Bhagat Balance Scale to greater than or equal to 54/56 indicating improved safety and decreased fall risk for daily activities. Progressing and ongoing  2. Patient will increase his score on the dynamic gait index to greater than or equal to 23/24 indicating improved balance and safety for community ambulation. Progressing and ongoing  3. Patient will ambulate with a normal gait pattern over level and unlevel surfaces without evidence of imbalance to improve safety for daily activities. Progressing and ongoing  4. Patient will demonstrate improved score on six minute walk test with stable vital signs indicating improved endurance and mobility for daily activities. New Goal  5. Patient will be independent in discharge HEP to continue to work on balance, strength, and endurance. New Goal  Rehabilitation Potential For Stated Goals: Good            The information in this section was collected on 12/8/17 (except where otherwise noted). HISTORY:   History of Present Injury/Illness (Reason for Referral):  Taking baclofen and valium for L UE pain, improved. Numbness L UE. Moving slower especially with position changes. No dizziness. No falls. No change in vision. Past Medical History/Comorbidities:   Mr. Angelica Duenas  has a past medical history of Chronic kidney disease; Hypertension; Stroke [facility]) (11/2017); and Thyroid disease. Mr. Angelica Duenas  has no past surgical history on file. Social History/Living Environment:     , was getting ready to retire.    Prior Level of Function/Work/Activity:  independent  Dominant Side: RIGHT  Previous Treatment Approaches:          OT  Personal Factors:          Sex:  male        Age:  57 y.o. Current Medications:       Current Outpatient Prescriptions:     baclofen (LIORESAL) 10 mg tablet, Take 1 Tab by mouth three (3) times daily. , Disp: 15 Tab, Rfl: 0    zolpidem (AMBIEN) 5 mg tablet, Take 1 Tab by mouth nightly as needed for Sleep. Max Daily Amount: 5 mg., Disp: 10 Tab, Rfl: 0    valsartan 320 mg tab 320 mg, hydroCHLOROthiazide 25 mg tab 25 mg, Take 1 Dose by mouth daily. , Disp: , Rfl:     levothyroxine (SYNTHROID) 75 mcg tablet, Take 75 mcg by mouth Daily (before breakfast). , Disp: , Rfl:     escitalopram oxalate (LEXAPRO) 10 mg tablet, Take 10 mg by mouth daily. , Disp: , Rfl:     dextroamphetamine-amphetamine (ADDERALL) 20 mg tablet, Take 20 mg by mouth daily. , Disp: , Rfl:    Date Last Reviewed:  1/10/2018    Number of Personal Factors/Comorbidities that affect the Plan of Care: 1-2: MODERATE COMPLEXITY   EXAMINATION:   Observation/Orthostatic Postural Assessment:          WNL  Strength:          4+/5  Functional Mobility:         Gait/Ambulation:  No AD, decreased arm swing L, mild path deviations with head movements or multitasking. Transfers:  independent        Bed Mobility:  Slowly, independently        Stairs:  With rail, step over step, decreased coordination L LE  Sensation:         Decreased sensation L LE  Postural Control & Balance:  · Bhagat Balance Scale:  53/56.   (A score less than 45/56 indicates high risk of falls)   Score at initial evaluation= 51/56  · Dynamic Gait Index:  21/24.   (A score less than or equal to19/24 is abnormal and predictive of falls)   Score at initial evaluation= 18/24   Body Structures Involved:  1. Nerves  2. Eyes and Ears  3. Muscles Body Functions Affected:  1. Sensory/Pain  2. Neuromusculoskeletal  3. Movement Related Activities and Participation Affected:  1. General Tasks and Demands  2. Mobility  3.  Domestic Life  4. Community, Social and [place]   Number of elements (examined above) that affect the Plan of Care: 3: MODERATE COMPLEXITY   CLINICAL PRESENTATION:   Presentation: Evolving clinical presentation with changing clinical characteristics: MODERATE COMPLEXITY   CLINICAL DECISION MAKING:   Outcome Measure: Tool Used: Bhagat Balance Scale  Score:  Initial: 51/56 Most Recent: 53/56 (Date: 1/3/18 )   Interpretation of Score: Each section is scored on a 0-4 scale, 0 representing the patients inability to perform the task and 4 representing independence. The scores of each section are added together for a total score of 56. The higher the patients score, the more independent the patient is. Any score below 45 indicates increased risk for falls. Score 56 55-45 44-34 33-23 22-12 11-1 0   Modifier CH CI CJ CK CL CM CN     Tool Used: Dynamic Gait Index  Score:  Initial: 18/24 Most Recent: 21/24 (Date: 1/3/18 )   Interpretation of Score: Each section is scored on a 0-3 scale, 0 representing the patients inability to perform the task and 3 representing independence. The scores of each section are added together for a total score of 24. Any score below 19 indicates increased risk for falls. Score 24 23-19 18-15 14-10 9-5 4-1 0   Modifier CH CI CJ CK CL CM CN     Tool Used: 6-MINUTE WALK TEST  Score:  Initial: 1350 feet Most Recent: X feet (Date: -- )   Interpretation of Score: Normal range varies but is approximately 6741-3951 Feet      Distance walked: 1350 feet     Baseline End of Test   Heart Rate 86bpm 97bpm   Dyspnea (Pj Scale)     Fatigue (Pj Scale)     SpO2 99% 98%   BP       Score 2133 [id number] 5360-5667 [phone] 426-16 15-0   Modifier CH CI CJ CK CL CM CN     ?  Mobility - Walking and Moving Around:     - CURRENT STATUS: CJ - 20%-39% impaired, limited or restricted    - GOAL STATUS: CI - 1%-19% impaired, limited or restricted    - D/C STATUS:  ---------------To be determined---------------    Medical Necessity:   · Patient is expected to demonstrate progress in strength, balance and functional technique to improve safety during daily activities. Reason for Services/Other Comments:  · Patient continues to demonstrate capacity to improve strength, balance, gait which will increase independence and increase safety. Use of outcome tool(s) and clinical judgement create a POC that gives a: Questionable prediction of patient's progress: MODERATE COMPLEXITY            TREATMENT:   (In addition to Assessment/Re-Assessment sessions the following treatments were rendered)  Pre-treatment Symptoms/Complaints:  1/10/2018: Patient reports he is doing ok today. He reports his medication time frame has changed, but he is still taking the same medications. He reports he has noticed a little bit of difference. Pain: Initial: Pain Intensity 1: 0  Post Session:  0     Neuromuscular Re-education ( 30 minutes):  Exercise/activities per grid below to improve balance, coordination, kinesthetic sense, posture and proprioception. Required minimal verbal cues to promote static and dynamic balance in standing.   Balance/Vestibular Treatment:   Activity   Date  1/3/18 Date  1/10/2018 Date   Activity/Exercise   Sets/reps/equipment Sets/reps/  equipment Sets/reps/  equipment   Walking with head turns     2 laps in hallway 2 laps in hallway    Walking with head up & down     2 laps in hallway 2 laps in hallway    Step ups          Step taps     6 inch step x 20 reps     Marching    2 laps in hallway    Sidestepping        Crossovers        Deweese        Walking  backwards          Tandem walking    2 laps in hallway    Weaving in/out of cones          Picking up cones          Sports cord          [email or web address]        Kick ball        Figure 8s         Circles right/left        Walking with 360 degree turns        Spirals        Weight shifting:    Left & Right      Tiltboard     Weight shifting: Forward & Backward       Tiltboard     Static Standing Balance      Tiltboard     Standing with feet apart     Eyes open and closed Eyes open and closed    Standing with feet together     Eyes open     Standing with feet semitandem   Eyes open     Standing with feet tandem   Eyes open     Single leg stance   Eyes open, 5 sec     X1/X2 Viewing exercises              Therapeutic Exercise: ( 15 minutes):  Exercises per grid below to improve mobility, strength and balance. Required minimal verbal cues to promote proper body alignment, promote proper body posture and promote proper body mechanics. Progressed resistance, range, repetitions and complexity of movement as indicated. Date:  1/10/2018   Activity/Exercise Parameters   Treadmill 13 minutes  2.0-3.6 mph   Nautilus leg press X    Nautilus leg extension X    Nautilus leg curl X    Calf stretch on slant board X    HEP: walking with arm swing, marching, tandem stance. "[employer] Portal  Treatment/Session Assessment:    · Response to Treatment:  Patient tolerated treatment well. · Compliance with Program/Exercises: compliant  · Recommendations/Intent for next treatment session: \"Next visit will focus on advancements to more challenging activities\".   Total Treatment Duration:  PT Patient Time In/Time Out  Time In: 1400  Time Out: 2000 Lolita Rodarte PT

## 2018-01-10 NOTE — PROGRESS NOTES
Rick Joseph  : 1955  [place]  Secondary:  [address]  at [place] [address] [facility], [address] [place] [address],[address], [place] [address].  [phone]   [phone]        OUTPATIENT OCCUPATIONAL THERAPY: Daily Note 1/10/2018    ICD-10: Treatment Diagnosis: Other lack of coordination (R27.8)  Precautions/Allergies:   Review of patient's allergies indicates no known allergies. Fall Risk Score: 2 (? 5 = High Risk)  MD Orders: OT to evaluate and treat MEDICAL/REFERRING DIAGNOSIS:   Cerebral infarction, unspecified [I63.9] Right thalamic CVA  DATE OF ONSET: approximately 2 weeks ago   REFERRING PHYSICIAN: Sapna Daniel MD  RETURN PHYSICIAN APPOINTMENT: unknown   1/3/18: Mr. Luanne Peters is demonstrating improving range of motion and functional use of the left upper extremity. He continues to struggle with maintaining visual attention to the left upper extremity and impaired proprioception that is affecting his ability to maintain functional grasp on items such as dishes when loading and unloading the . Feel he will continue to benefit from skilled occupational therapy to maximize functional safety and independence with activities of daily living. INITIAL ASSESSMENT:  Mr. Luanne Peters presents s/p right thalamic CVA with decreased strength, coordination and range of motion of the non-dominant left upper extremity. Feel he will benefit from skilled occupational therapy to maximize functional use of the left upper extremity to complete activities of daily living independently and safely. PLAN OF CARE:   PROBLEM LIST:  1. Decreased Strength  2. Decreased ADL/Functional Activities  3. Increased Pain  4. Decreased Flexibility/Joint Mobility   5. Decreased gross and fine motor coordination of the left upper extremity  INTERVENTIONS PLANNED:  1. Activities of daily living training  2. Manual therapy training  3. Modalities  4.  Neuromuscular re-eduation  5. Therapeutic activity  6. Therapeutic exercise   TREATMENT PLAN:  Effective Dates: 11/30/17 TO 2/30/18. Frequency/Duration: 2 times a week for 12 weeks  GOALS: (Goals have been discussed and agreed upon with patient.)  Short-Term Functional Goals: Time Frame: 6 weeks  1. Patient will demonstrate independence with home exercise program for left upper extremity range of motion, strength and coordination. Met  2. Patient will increase left upper extremity strength by at least one half of one manual muscle grade in order to increase use in self-care activities. Continue to address  3. Patient will increase left  strength by 2-3 pounds in order to hold soap during bathing activities. Continue to address  Discharge Goals: Time Frame: 12 weeks  1. Patient will increase left shoulder abduction by 10-15 degrees in order to increase functional use in dressing and cooking/home management activities. Met  2. Patient will increase left fine motor coordination as evidenced by completion of 9-hole peg test in no more than 40 seconds in order to increase functional use in dressing tasks including buttoning and tying shoes and ties. Continue to address  3. Patient will increase left  strength by at least 4-5 pounds in order to use fork and knife to cut food during meals and meal preparation. Continue to address  Rehabilitation Potential For Stated Goals: Good  Regarding Facundo Bravo's therapy, I certify that the treatment plan above will be carried out by a therapist or under their direction. Thank you for this referral,  Gayla Ch, OT     Referring Physician Signature: Crissy Herman MD _________________________  Date _________            The information in this section was collected on 1/3/18 (except where otherwise noted).   OCCUPATIONAL PROFILE & HISTORY:   History of Present Injury/Illness (Reason for Referral):  Patient came to the ER approximately 2 weeks ago with symptoms of numbness on left side of face, arm and body. He returned to the hospital approximately one week later and was diagnosed with a right thalamic CVA. He and his brothers report that he has seen his PCP, Dr. Meño Oscar, as well as a teleconference/internet visit with 2 neurologists since returning home from the hospital. Patient reports pain that \"feels like electric shocks and spasms\" in his left neck, arm, trunk and leg. He and his brothers report that he has begun taking Baclofen and Valium to address the pain and spasms today. Past Medical History/Comorbidities:   Mr. Lizbet Brenner  has a past medical history of Chronic kidney disease; Hypertension; Stroke [facility]) (11/2017); and Thyroid disease. Mr. Lizbet Brenner  has no past surgical history on file. Social History/Living Environment:         Patient's brother lives across the street and is able to assist him as needed at home. Prior Level of Function/Work/Activity:  Retired but waiting on next contract work in [employer]  with [employer]  Dominant Side:         RIGHT    Current Medications:    Current Outpatient Prescriptions:     baclofen (LIORESAL) 10 mg tablet, Take 1 Tab by mouth three (3) times daily. , Disp: 15 Tab, Rfl: 0    zolpidem (AMBIEN) 5 mg tablet, Take 1 Tab by mouth nightly as needed for Sleep. Max Daily Amount: 5 mg., Disp: 10 Tab, Rfl: 0    valsartan 320 mg tab 320 mg, hydroCHLOROthiazide 25 mg tab 25 mg, Take 1 Dose by mouth daily. , Disp: , Rfl:     levothyroxine (SYNTHROID) 75 mcg tablet, Take 75 mcg by mouth Daily (before breakfast). , Disp: , Rfl:     escitalopram oxalate (LEXAPRO) 10 mg tablet, Take 10 mg by mouth daily. , Disp: , Rfl:     dextroamphetamine-amphetamine (ADDERALL) 20 mg tablet, Take 20 mg by mouth daily. , Disp: , Rfl:             Date Last Reviewed:  1/10/2018   Complexity Level : Expanded review of therapy/medical records (1-2):  MODERATE COMPLEXITY   ASSESSMENT OF OCCUPATIONAL PERFORMANCE:   ROM:            Right upper extremity WDLs Strength:                          Balance:                   Coordination:                   Mental Status:                   Vision:                   Activities of Daily Living:           Basic ADLs (From Assessment) Complex ADLs (From Assessment)         Grooming/Bathing/Dressing Activities of Daily Living                                      Physical Skills Involved:  1. Range of Motion  2. Strength  3. Fine Motor Control  4. Gross Motor Control  5. Pain (acute) Cognitive Skills Affected (resulting in the inability to perform in a timely and safe manner):  1. Sustained Attention  2. Divided Attention Psychosocial Skills Affected:  1. Environmental Adaptation  2. Social Interaction  3. Emotional Regulation   Number of elements that affect the Plan of Care: 5+:  HIGH COMPLEXITY   CLINICAL DECISION MAKING:   Outcome Measure: Tool Used: Disabilities of the Arm, Shoulder and Hand (DASH) Questionnaire - Quick Version  Score:  Initial: 42/55  Most Recent: X/55 (Date: -- )   Interpretation of Score: The DASH is designed to measure the activities of daily living in person's with upper extremity dysfunction or pain. Each section is scored on a 1-5 scale, 5 representing the greatest disability. The scores of each section are added together for a total score of 55. Score 11 12-19 20-28 29-37 38-45 46-54 55   Modifier CH CI CJ CK CL CM CN ? Self Care:     - CURRENT STATUS: CL - 60%-79% impaired, limited or restricted    - GOAL STATUS: CJ - 20%-39% impaired, limited or restricted    - D/C STATUS:  ---------------To be determined---------------  Medical Necessity:   · Patient demonstrates good rehab potential due to higher previous functional level. Reason for Services/Other Comments:  · Patient continues to require skilled intervention due to decreased safety and independence with activities of daily living.   Clinical Decision-Making Assessment:  Patient is motivated to participate in therapy; progress may be limited by his painful spasms in the left upper extremity. Assessment process, impact of co-morbidities, assessment modification\need for assistance, and selection of interventions: Analytical Complexity:MODERATE COMPLEXITY   TREATMENT:   (In addition to Assessment/Re-Assessment sessions the following treatments were rendered)    Pre-treatment Symptoms/Complaints:  Patient states, \"I feel like I have learned more today than any other session. \"  Pain: Initial: Pain Intensity 1: 0  Post Session:  0/10     Therapeutic Exercise: (  15 minutes):  Exercises per grid below to improve strength and dynamic movement of arm - left to improve functional lifting, carrying, reaching and overhead activites. Required minimal visual and verbal cues to promote proper body mechanics. Progressed resistance, range and repetitions as indicated.      Date:  12/6/17 Date:  12/8/17 Date:  12/13/17 Date:  12/15/17 Date:  12/22/17 Date:  1/3/18 Date:  1/5/18 Date:  1/10/18   Activity/Exercise Parameters Parameters Parameters        Shoulder abduction/flexion AROM with red theraband x 20 reps          Chest pulls AROM with red theraband x 20 reps AROM with green theraband x 20 reps         Elbow flexion/extension AROM with red theraband x 20 reps AROM with green theraband x 20 reps         UBE Level 6 for 5 minutes; level 7 for 3 minutes and level 8 for 2 minutes Level 5 x 10 minutes Level 5 x 10 minutes Level 5 x 10 minutes Level 5 x 10 minutes Level 5 x 10 minutes Level 4.8 x 10 minutes Level 4.8 x 10 minutes   Hand helper 50 pounds  3 x 10 55 pounds  3 x 10 55 pounds  3 x 10 55 pounds  3 x 10 55 pounds  3 x 10 55 pounds  3 x 10  With arm overhead 55 pounds  3 x 10  With arm overhead 55 pounds  3 x 10  With arm overhead   Resistive clothespin Blue to  25 foam pieces Blue to  25 foam pieces Blue to  25 foam pieces Blue to  25 foam pieces Blue to  25 foam pieces Black to  25 foam pieces and place them in cup on elevated surface  Black to  25 foam pieces and place them in cup on elevated surface   Gentle neck AROM   All planes of motion gently x 5 reps each         Wall push-ups        2 x 10     Neuromuscular Re-education: (  25 mintues):  Exercise/activities per grid below to improve coordination, kinesthetic sense and proprioception. Required minimal visual and verbal cues to promote coordination of left, upper extremity(s) and promote motor control of left, upper extremity(s). Patient completed sit to stand from floor using his left arm in base of support during transfer with SBA for safety. He then completed dynamic reach with alternating arms and legs in quadruped x 10 reps each. He placed 10 pegs, sleeves and washers into the [place] pegboard using his left hand, removed them with his right. He used light weighted ball and rebounder to toss and catch with his left hand x 5 minutes. Treatment/Session Assessment:    · Response to Treatment:  Patient demonstrating improving strength and coordination for functional activities such as getting up off the floor today. · Compliance with Program/Exercises: Will assess as treatment progresses. · Recommendations/Intent for next treatment session: \"Next visit will focus on advancements to more challenging activities and reduction in assistance provided\".   Total Treatment Duration:  OT Patient Time In/Time Out  Time In: 7405  Time Out: Chandra Lee OT

## 2018-01-11 NOTE — PROGRESS NOTES
Kamran Tricia Janusz cancelled his appointment for 1/12/18 because his family will be out of town and he will not have transportation to bring him.     Seth Adler, OT

## 2018-01-12 ENCOUNTER — APPOINTMENT (OUTPATIENT)
Dept: PHYSICAL THERAPY | Age: 63
End: 2018-01-12
Attending: HOSPITALIST
Payer: MEDICARE

## 2018-01-16 ENCOUNTER — HOSPITAL ENCOUNTER (OUTPATIENT)
Dept: PHYSICAL THERAPY | Age: 63
Discharge: HOME | End: 2018-01-16
Attending: HOSPITALIST
Payer: MEDICARE

## 2018-01-16 PROCEDURE — 97112 NEUROMUSCULAR REEDUCATION: CPT

## 2018-01-16 PROCEDURE — 97110 THERAPEUTIC EXERCISES: CPT

## 2018-01-16 NOTE — PROGRESS NOTES
Shahana Contreras  : 1955  [place]  Secondary:  [address]  at [facility]  [address], [address] [address],[address], [address]  [phone]   [phone]           OUTPATIENT PHYSICAL THERAPY:Daily Note 2018    ICD-10: Treatment Diagnosis: Other abnormalities of gait and mobility R26.89  Precautions/Allergies:   Review of patient's allergies indicates no known allergies. Fall Risk Score: 1 (? 5 = High Risk)  MD Orders: eval and treat MEDICAL/REFERRING DIAGNOSIS:  . Cerebral infarction, unspecified [I63.9] Right thalamic CVA  DATE OF ONSET: 2017  REFERRING PHYSICIAN: Shirline Boeck, MD  RETURN PHYSICIAN APPOINTMENT:      RECERTIFICATION 77:  Mr. Joshua Townsend has attended 4 PT sessions from 17 to 1/3/18 for decreased balance and gait, and decreased coordination/proprioception L LE. .  Treatment has consisted of balance, coordination, and strengthening to improve overall safety, mobility, and performance with activities of daily living. Patient would benefit from PT to address these problems to improve patient's independence and safety with mobility and daily activities. Thank you. PROBLEM LIST (Impacting functional limitations):  1. Decreased Strength  2. Decreased ADL/Functional Activities  3. Decreased Transfer Abilities  4. Decreased Ambulation Ability/Technique  5. Decreased Balance  6. Decreased Activity Tolerance  7. Decreased [place] with Home Exercise Program INTERVENTIONS PLANNED:  1. Balance Exercise  2. Gait Training  3. Home Exercise Program (HEP)  4. Neuromuscular Re-education/Strengthening  5. Therapeutic Activites  6. Therapeutic Exercise/Strengthening  7. Transfer Training   8. TREATMENT PLAN:  Effective Dates: 2017 TO 3/2/2018.   Frequency/Duration: 1-2 times a week for 8-12 weeks  GOALS: (Goals have been discussed and agreed upon with patient.)  Short-Term Functional Goals: Time Frame: 2-4 weeks  1. Patient will demonstrate independence and compliance with home exercise program to improve balance and strength for daily activities. MET  2. Patient will increase his score on the Bhagat Balance Scale to greater than or equal to 53/56 indicating improved safety and decreased fall risk for daily activities. MET  Discharge Goals: Time Frame: 8-12 weeks  1. Patient will increase his score on the Bhagat Balance Scale to greater than or equal to 54/56 indicating improved safety and decreased fall risk for daily activities. Progressing and ongoing  2. Patient will increase his score on the dynamic gait index to greater than or equal to 23/24 indicating improved balance and safety for community ambulation. Progressing and ongoing  3. Patient will ambulate with a normal gait pattern over level and unlevel surfaces without evidence of imbalance to improve safety for daily activities. Progressing and ongoing  4. Patient will demonstrate improved score on six minute walk test with stable vital signs indicating improved endurance and mobility for daily activities. New Goal  5. Patient will be independent in discharge HEP to continue to work on balance, strength, and endurance. New Goal  Rehabilitation Potential For Stated Goals: Good            The information in this section was collected on 12/8/17 (except where otherwise noted). HISTORY:   History of Present Injury/Illness (Reason for Referral):  Taking baclofen and valium for L UE pain, improved. Numbness L UE. Moving slower especially with position changes. No dizziness. No falls. No change in vision. Past Medical History/Comorbidities:   Mr. Angelica Duenas  has a past medical history of Chronic kidney disease; Hypertension; Stroke [facility]) (11/2017); and Thyroid disease. Mr. Angelica Duenas  has no past surgical history on file. Social History/Living Environment:     , was getting ready to retire.    Prior Level of Function/Work/Activity:  independent  Dominant Side: RIGHT  Previous Treatment Approaches:          OT  Personal Factors:          Sex:  male        Age:  57 y.o. Current Medications:       Current Outpatient Prescriptions:     baclofen (LIORESAL) 10 mg tablet, Take 1 Tab by mouth three (3) times daily. , Disp: 15 Tab, Rfl: 0    zolpidem (AMBIEN) 5 mg tablet, Take 1 Tab by mouth nightly as needed for Sleep. Max Daily Amount: 5 mg., Disp: 10 Tab, Rfl: 0    valsartan 320 mg tab 320 mg, hydroCHLOROthiazide 25 mg tab 25 mg, Take 1 Dose by mouth daily. , Disp: , Rfl:     levothyroxine (SYNTHROID) 75 mcg tablet, Take 75 mcg by mouth Daily (before breakfast). , Disp: , Rfl:     escitalopram oxalate (LEXAPRO) 10 mg tablet, Take 10 mg by mouth daily. , Disp: , Rfl:     dextroamphetamine-amphetamine (ADDERALL) 20 mg tablet, Take 20 mg by mouth daily. , Disp: , Rfl:    Date Last Reviewed:  1/16/2018    Number of Personal Factors/Comorbidities that affect the Plan of Care: 1-2: MODERATE COMPLEXITY   EXAMINATION:   Observation/Orthostatic Postural Assessment:          WNL  Strength:          4+/5  Functional Mobility:         Gait/Ambulation:  No AD, decreased arm swing L, mild path deviations with head movements or multitasking. Transfers:  independent        Bed Mobility:  Slowly, independently        Stairs:  With rail, step over step, decreased coordination L LE  Sensation:         Decreased sensation L LE  Postural Control & Balance:  · Bahgat Balance Scale:  53/56.   (A score less than 45/56 indicates high risk of falls)   Score at initial evaluation= 51/56  · Dynamic Gait Index:  21/24.   (A score less than or equal to19/24 is abnormal and predictive of falls)   Score at initial evaluation= 18/24   Body Structures Involved:  1. Nerves  2. Eyes and Ears  3. Muscles Body Functions Affected:  1. Sensory/Pain  2. Neuromusculoskeletal  3. Movement Related Activities and Participation Affected:  1. General Tasks and Demands  2. Mobility  3.  Domestic Life  4. Community, Social and [place]   Number of elements (examined above) that affect the Plan of Care: 3: MODERATE COMPLEXITY   CLINICAL PRESENTATION:   Presentation: Evolving clinical presentation with changing clinical characteristics: MODERATE COMPLEXITY   CLINICAL DECISION MAKING:   Outcome Measure: Tool Used: Bhagat Balance Scale  Score:  Initial: 51/56 Most Recent: 53/56 (Date: 1/3/18 )   Interpretation of Score: Each section is scored on a 0-4 scale, 0 representing the patients inability to perform the task and 4 representing independence. The scores of each section are added together for a total score of 56. The higher the patients score, the more independent the patient is. Any score below 45 indicates increased risk for falls. Score 56 55-45 44-34 33-23 22-12 11-1 0   Modifier CH CI CJ CK CL CM CN     Tool Used: Dynamic Gait Index  Score:  Initial: 18/24 Most Recent: 21/24 (Date: 1/3/18 )   Interpretation of Score: Each section is scored on a 0-3 scale, 0 representing the patients inability to perform the task and 3 representing independence. The scores of each section are added together for a total score of 24. Any score below 19 indicates increased risk for falls. Score 24 23-19 18-15 14-10 9-5 4-1 0   Modifier CH CI CJ CK CL CM CN     Tool Used: 6-MINUTE WALK TEST  Score:  Initial: 1350 feet Most Recent: X feet (Date: -- )   Interpretation of Score: Normal range varies but is approximately 5241-9683 Feet      Distance walked: 1350 feet     Baseline End of Test   Heart Rate 86bpm 97bpm   Dyspnea (Pj Scale)     Fatigue (Pj Scale)     SpO2 99% 98%   BP       Score 2133 9035-4995 5027-1353 [phone] 426-16 15-0   Modifier CH CI CJ CK CL CM CN     ?  Mobility - Walking and Moving Around:     - CURRENT STATUS: CJ - 20%-39% impaired, limited or restricted    - GOAL STATUS: CI - 1%-19% impaired, limited or restricted    - D/C STATUS:  ---------------To be determined---------------    Medical Necessity:   · Patient is expected to demonstrate progress in strength, balance and functional technique to improve safety during daily activities. Reason for Services/Other Comments:  · Patient continues to demonstrate capacity to improve strength, balance, gait which will increase independence and increase safety. Use of outcome tool(s) and clinical judgement create a POC that gives a: Questionable prediction of patient's progress: MODERATE COMPLEXITY            TREATMENT:   (In addition to Assessment/Re-Assessment sessions the following treatments were rendered)  Pre-treatment Symptoms/Complaints:  1/16/2018: \"didn't sleep much last night. Was upset with my daughter yesterday.'  Pain: Initial: Pain Intensity 1: 0  Post Session:  0     Neuromuscular Re-education (43 Minutes):  Exercise/activities per grid below to improve balance, coordination, kinesthetic sense, posture and proprioception. Required minimal verbal cues to promote static and dynamic balance in standing.   Balance/Vestibular Treatment:   Activity   Date  1/3/18 Date  1/10/2018 Date  1/16/18   Activity/Exercise   Sets/reps/equipment Sets/reps/  equipment Sets/reps/  equipment   Walking with head turns     2 laps in hallway 2 laps in hallway    Walking with head up & down     2 laps in hallway 2 laps in hallway    Step overs       Over 2 blue foams each leg forward then alternating, then sidestepping over L/R   Step taps     6 inch step x 20 reps  8 inch step 2 x 20 reps forward and cross tap   Marching    2 laps in hallway 2 laps in hallway   Sidestepping     2 laps in hallway   Crossovers     4 laps in hallway   [place]        Walking  backwards          Tandem walking    2 laps in hallway    Weaving in/out of cones          Picking up cones          Sports cord          [employer]        Kick ball        Figure 8s         Circles right/left        Walking with 360 degree turns        Sit to stand   From mat table x 20 reps with no UE assist on yellow foam.    Weight shifting:    Left & Right      Tiltboard  rockerboard   Weight shifting: Forward & Backward       Tiltboard  rockboard   Static Standing Balance      Tiltboard  sanddune eyes open with ball, rotation and diagonal for weight shifting. Standing with feet apart     Eyes open and closed Eyes open and closed On Sanddune and rockerboardeyes open and closed   Standing with feet together     Eyes open     Standing with feet semitandem   Eyes open  Blue foams eyes open and closed   Standing with feet tandem   Eyes open     Single leg stance   Eyes open, 5 sec     X1/X2 Viewing exercises              Therapeutic Exercise: ( ):  Exercises per grid below to improve mobility, strength and balance. Required minimal verbal cues to promote proper body alignment, promote proper body posture and promote proper body mechanics. Progressed resistance, range, repetitions and complexity of movement as indicated. Date:     Activity/Exercise Parameters   Treadmill    Nautilus leg press X    Nautilus leg extension X    Nautilus leg curl X    Calf stretch on slant board X    HEP: walking with arm swing, marching, tandem stance. [employer]  Treatment/Session Assessment:    · Response to Treatment:  Patient tolerated treatment well. He was challenged by the dynamic balance activities that he did today. His balance seemed to improve with repetition of the task. Continue to progress as tolerated. · Compliance with Program/Exercises: compliant  · Recommendations/Intent for next treatment session: \"Next visit will focus on advancements to more challenging activities\".   Total Treatment Duration:  PT Patient Time In/Time Out  Time In: 1257  Time Out: [address] Edie Stahl

## 2018-01-16 NOTE — PROGRESS NOTES
Flaca Soriano  : 1955  [place]  Secondary:  [address]  at [place] [id number] [facility], [address] [place] [address],[address], [address] [facility]  [phone]   [phone]        OUTPATIENT OCCUPATIONAL THERAPY: Daily Note 2018    ICD-10: Treatment Diagnosis: Other lack of coordination (R27.8)  Precautions/Allergies:   Review of patient's allergies indicates no known allergies. Fall Risk Score: 2 (? 5 = High Risk)  MD Orders: OT to evaluate and treat MEDICAL/REFERRING DIAGNOSIS:   Cerebral infarction, unspecified [I63.9] Right thalamic CVA  DATE OF ONSET: approximately 2 weeks ago   REFERRING PHYSICIAN: Bertha Lyn MD  RETURN PHYSICIAN APPOINTMENT: unknown   1/3/18: Mr. Angelica Duenas is demonstrating improving range of motion and functional use of the left upper extremity. He continues to struggle with maintaining visual attention to the left upper extremity and impaired proprioception that is affecting his ability to maintain functional grasp on items such as dishes when loading and unloading the . Feel he will continue to benefit from skilled occupational therapy to maximize functional safety and independence with activities of daily living. INITIAL ASSESSMENT:  Mr. Angelica Duenas presents s/p right thalamic CVA with decreased strength, coordination and range of motion of the non-dominant left upper extremity. Feel he will benefit from skilled occupational therapy to maximize functional use of the left upper extremity to complete activities of daily living independently and safely. PLAN OF CARE:   PROBLEM LIST:  1. Decreased Strength  2. Decreased ADL/Functional Activities  3. Increased Pain  4. Decreased Flexibility/Joint Mobility   5. Decreased gross and fine motor coordination of the left upper extremity  INTERVENTIONS PLANNED:  1. Activities of daily living training  2. Manual therapy training  3. Modalities  4.  Neuromuscular re-eduation  5. Therapeutic activity  6. Therapeutic exercise   TREATMENT PLAN:  Effective Dates: 11/30/17 TO 2/30/18. Frequency/Duration: 2 times a week for 12 weeks  GOALS: (Goals have been discussed and agreed upon with patient.)  Short-Term Functional Goals: Time Frame: 6 weeks  1. Patient will demonstrate independence with home exercise program for left upper extremity range of motion, strength and coordination. Met  2. Patient will increase left upper extremity strength by at least one half of one manual muscle grade in order to increase use in self-care activities. Continue to address  3. Patient will increase left  strength by 2-3 pounds in order to hold soap during bathing activities. Continue to address  Discharge Goals: Time Frame: 12 weeks  1. Patient will increase left shoulder abduction by 10-15 degrees in order to increase functional use in dressing and cooking/home management activities. Met  2. Patient will increase left fine motor coordination as evidenced by completion of 9-hole peg test in no more than 40 seconds in order to increase functional use in dressing tasks including buttoning and tying shoes and ties. Continue to address  3. Patient will increase left  strength by at least 4-5 pounds in order to use fork and knife to cut food during meals and meal preparation. Continue to address  Rehabilitation Potential For Stated Goals: Good  Regarding Kira Bravo's therapy, I certify that the treatment plan above will be carried out by a therapist or under their direction. Thank you for this referral,  Cheryl Nunes OT     Referring Physician Signature: Vanita Grace MD _________________________  Date _________            The information in this section was collected on 1/3/18 (except where otherwise noted).   OCCUPATIONAL PROFILE & HISTORY:   History of Present Injury/Illness (Reason for Referral):  Patient came to the ER approximately 2 weeks ago with symptoms of numbness on left side of face, arm and body. He returned to the hospital approximately one week later and was diagnosed with a right thalamic CVA. He and his brothers report that he has seen his PCP, Dr. Pankaj Mac, as well as a teleconference/internet visit with 2 neurologists since returning home from the hospital. Patient reports pain that \"feels like electric shocks and spasms\" in his left neck, arm, trunk and leg. He and his brothers report that he has begun taking Baclofen and Valium to address the pain and spasms today. Past Medical History/Comorbidities:   Mr. Madhavi Olea  has a past medical history of Chronic kidney disease; Hypertension; Stroke [facility]) (11/2017); and Thyroid disease. Mr. Madhavi Olea  has no past surgical history on file. Social History/Living Environment:         Patient's brother lives across the street and is able to assist him as needed at home. 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 , Disp: 15 Tab, Rfl: 0    zolpidem (AMBIEN) 5 mg tablet, Take 1 Tab by mouth nightly as needed for Sleep. Max Daily Amount: 5 mg., Disp: 10 Tab, Rfl: 0    valsartan 320 mg tab 320 mg, hydroCHLOROthiazide 25 mg tab 25 mg, Take 1 Dose by mouth daily. , Disp: , Rfl:     levothyroxine (SYNTHROID) 75 mcg tablet, Take 75 mcg by mouth Daily (before breakfast). , Disp: , Rfl:     escitalopram oxalate (LEXAPRO) 10 mg tablet, Take 10 mg by mouth daily. , Disp: , Rfl:     dextroamphetamine-amphetamine (ADDERALL) 20 mg tablet, Take 20 mg by mouth daily. , Disp: , Rfl:             Date Last Reviewed:  1/16/2018   Complexity Level : Expanded review of therapy/medical records (1-2):  MODERATE COMPLEXITY   ASSESSMENT OF OCCUPATIONAL PERFORMANCE:   ROM:            Right upper extremity WDLs Strength:                          Balance:                   Coordination:                   Mental Status:                   Vision:                   Activities of Daily Living:           Basic ADLs (From Assessment) Complex ADLs (From Assessment)         Grooming/Bathing/Dressing Activities of Daily Living                                      Physical Skills Involved:  1. Range of Motion  2. Strength  3. Fine Motor Control  4. Gross Motor Control  5. Pain (acute) Cognitive Skills Affected (resulting in the inability to perform in a timely and safe manner):  1. Sustained Attention  2. Divided Attention Psychosocial Skills Affected:  1. Environmental Adaptation  2. Social Interaction  3. Emotional Regulation   Number of elements that affect the Plan of Care: 5+:  HIGH COMPLEXITY   CLINICAL DECISION MAKING:   Outcome Measure: Tool Used: Disabilities of the Arm, Shoulder and Hand (DASH) Questionnaire - Quick Version  Score:  Initial: 42/55  Most Recent: X/55 (Date: -- )   Interpretation of Score: The DASH is designed to measure the activities of daily living in person's with upper extremity dysfunction or pain. Each section is scored on a 1-5 scale, 5 representing the greatest disability. The scores of each section are added together for a total score of 55. Score 11 12-19 20-28 29-37 38-45 46-54 55   Modifier CH CI CJ CK CL CM CN ? Self Care:     - CURRENT STATUS: CL - 60%-79% impaired, limited or restricted    - GOAL STATUS: CJ - 20%-39% impaired, limited or restricted    - D/C STATUS:  ---------------To be determined---------------  Medical Necessity:   · Patient demonstrates good rehab potential due to higher previous functional level. Reason for Services/Other Comments:  · Patient continues to require skilled intervention due to decreased safety and independence with activities of daily living.   Clinical Decision-Making Assessment:  Patient is motivated to participate in therapy; progress may be limited by his painful spasms in the left upper extremity. Assessment process, impact of co-morbidities, assessment modification\need for assistance, and selection of interventions: Analytical Complexity:MODERATE COMPLEXITY   TREATMENT:   (In addition to Assessment/Re-Assessment sessions the following treatments were rendered)    Pre-treatment Symptoms/Complaints:  Patient states, \"I am worn out; we did a lot of tough things today. It was good. \"  Pain: Initial: Pain Intensity 1: 0  Post Session:  0/10     Therapeutic Exercise: (  25 minutes):  Exercises per grid below to improve strength and dynamic movement of arm - left to improve functional lifting, carrying, reaching and overhead activites. Required minimal visual and verbal cues to promote proper body mechanics. Progressed resistance, range and repetitions as indicated.      Date:  12/8/17 Date:  12/13/17 Date:  12/15/17 Date:  12/22/17 Date:  1/3/18 Date:  1/5/18 Date:  1/10/18 Date:  1/16/18   Activity/Exercise Parameters Parameters         Shoulder abduction/flexion           Chest pulls AROM with green theraband x 20 reps          Elbow flexion/extension AROM with green theraband x 20 reps          UBE Level 5 x 10 minutes Level 5 x 10 minutes Level 5 x 10 minutes Level 5 x 10 minutes Level 5 x 10 minutes Level 4.8 x 10 minutes Level 4.8 x 10 minutes Level 5 x 10 minutes   Hand helper 55 pounds  3 x 10 55 pounds  3 x 10 55 pounds  3 x 10 55 pounds  3 x 10 55 pounds  3 x 10  With arm overhead 55 pounds  3 x 10  With arm overhead 55 pounds  3 x 10  With arm overhead    Resistive clothespin Blue to  25 foam pieces Blue to  25 foam pieces Blue to  25 foam pieces Blue to  25 foam pieces Black to  25 foam pieces and place them in cup on elevated surface  Black to  25 foam pieces and place them in cup on elevated surface    Gentle neck AROM  All planes of motion gently x 5 reps each          Wall push-ups       2 x 10 2 x 10   Pronation/supination        With light hammer x 25 reps   Wrist flexion/extension        With red theraband x 10 reps   Radial/ulnar deviation        With red theraband x 10 reps   Washer wand        X 3 reps                           Neuromuscular Re-education: (  15 mintues):  Exercise/activities per grid below to improve coordination, kinesthetic sense and proprioception. Required minimal visual and verbal cues to promote coordination of left, upper extremity(s) and promote motor control of left, upper extremity(s). Patient completed dynamic reach with alternating arms and legs in quadruped x 10 reps each. He then completed planks x 3, 5 seconds each. He completed toss and catch of yellow weighted ball with rebounder x 3 minutes. He then participated in Spot It game, using left hand to deal and play cards, matching objects as quickly as possible to challenge divided attention and visual scanning over multiple objects. Treatment/Session Assessment:    · Response to Treatment:  Patient demonstrating improving strength and coordination of the left upper extremity for functional activities. · Compliance with Program/Exercises: Will assess as treatment progresses. · Recommendations/Intent for next treatment session: \"Next visit will focus on advancements to more challenging activities and reduction in assistance provided\".   Total Treatment Duration:  OT Patient Time In/Time Out  Time In: 1340  Time Out: 3620 Rai Fernandez OT

## 2018-01-18 ENCOUNTER — HOSPITAL ENCOUNTER (OUTPATIENT)
Dept: PHYSICAL THERAPY | Age: 63
Discharge: HOME | End: 2018-01-18
Attending: HOSPITALIST
Payer: MEDICARE

## 2018-01-18 NOTE — PROGRESS NOTES
Rocio Christensenleatha cancelled today's appointment due to inclement weather.     Kassandra Martin, OT

## 2018-01-18 NOTE — PROGRESS NOTES
[facility]rapy Center at [address], [address] [place] [address],[address] [place], [address]  Phone: [phone]   Fax: [phone]    OUTPATIENT DAILY NOTE    NAME/AGE/GENDER: Denis Thomas is a 57 y.o. male. DATE: 1/18/2018    Patient cancelled his appointment for today due to weather. Will plan to follow up on next scheduled visit.     Beulah David, PT

## 2018-01-22 ENCOUNTER — HOSPITAL ENCOUNTER (OUTPATIENT)
Dept: PHYSICAL THERAPY | Age: 63
Discharge: HOME | End: 2018-01-22
Attending: HOSPITALIST
Payer: MEDICARE

## 2018-01-22 PROCEDURE — 97110 THERAPEUTIC EXERCISES: CPT

## 2018-01-22 PROCEDURE — 97112 NEUROMUSCULAR REEDUCATION: CPT

## 2018-01-22 NOTE — PROGRESS NOTES
Lynsey Gallardo  : 1955  [place]  Secondary:  [address]  at [place] [id number] [facility], [address] [place] [address],[address], [address] [facility]  [phone]   [phone]        OUTPATIENT OCCUPATIONAL THERAPY: Daily Note 2018    ICD-10: Treatment Diagnosis: Other lack of coordination (R27.8)  Precautions/Allergies:   Review of patient's allergies indicates no known allergies. Fall Risk Score: 2 (? 5 = High Risk)  MD Orders: OT to evaluate and treat MEDICAL/REFERRING DIAGNOSIS:   Cerebral infarction, unspecified [I63.9] Right thalamic CVA  DATE OF ONSET: approximately 2 weeks ago   REFERRING PHYSICIAN: Nena Vidal MD  RETURN PHYSICIAN APPOINTMENT: unknown   1/3/18: Mr. Lana Bowers is demonstrating improving range of motion and functional use of the left upper extremity. He continues to struggle with maintaining visual attention to the left upper extremity and impaired proprioception that is affecting his ability to maintain functional grasp on items such as dishes when loading and unloading the . Feel he will continue to benefit from skilled occupational therapy to maximize functional safety and independence with activities of daily living. INITIAL ASSESSMENT:  Mr. Lana Bowers presents s/p right thalamic CVA with decreased strength, coordination and range of motion of the non-dominant left upper extremity. Feel he will benefit from skilled occupational therapy to maximize functional use of the left upper extremity to complete activities of daily living independently and safely. PLAN OF CARE:   PROBLEM LIST:  1. Decreased Strength  2. Decreased ADL/Functional Activities  3. Increased Pain  4. Decreased Flexibility/Joint Mobility   5. Decreased gross and fine motor coordination of the left upper extremity  INTERVENTIONS PLANNED:  1. Activities of daily living training  2. Manual therapy training  3. Modalities  4.  Neuromuscular re-eduation  5. Therapeutic activity  6. Therapeutic exercise   TREATMENT PLAN:  Effective Dates: 11/30/17 TO 2/30/18. Frequency/Duration: 2 times a week for 12 weeks  GOALS: (Goals have been discussed and agreed upon with patient.)  Short-Term Functional Goals: Time Frame: 6 weeks  1. Patient will demonstrate independence with home exercise program for left upper extremity range of motion, strength and coordination. Met  2. Patient will increase left upper extremity strength by at least one half of one manual muscle grade in order to increase use in self-care activities. Continue to address  3. Patient will increase left  strength by 2-3 pounds in order to hold soap during bathing activities. Continue to address  Discharge Goals: Time Frame: 12 weeks  1. Patient will increase left shoulder abduction by 10-15 degrees in order to increase functional use in dressing and cooking/home management activities. Met  2. Patient will increase left fine motor coordination as evidenced by completion of 9-hole peg test in no more than 40 seconds in order to increase functional use in dressing tasks including buttoning and tying shoes and ties. Continue to address  3. Patient will increase left  strength by at least 4-5 pounds in order to use fork and knife to cut food during meals and meal preparation. Continue to address  Rehabilitation Potential For Stated Goals: Good  Regarding Kira Bravo's therapy, I certify that the treatment plan above will be carried out by a therapist or under their direction. Thank you for this referral,  Cheryl Nunes OT     Referring Physician Signature: Vanita Grace MD _________________________  Date _________            The information in this section was collected on 1/3/18 (except where otherwise noted).   OCCUPATIONAL PROFILE & HISTORY:   History of Present Injury/Illness (Reason for Referral):  Patient came to the ER approximately 2 weeks ago with symptoms of numbness on left side of face, arm and body. He returned to the hospital approximately one week later and was diagnosed with a right thalamic CVA. He and his brothers report that he has seen his PCP, Dr. Breann Hernandes, as well as a teleconference/internet visit with 2 neurologists since returning home from the hospital. Patient reports pain that \"feels like electric shocks and spasms\" in his left neck, arm, trunk and leg. He and his brothers report that he has begun taking Baclofen and Valium to address the pain and spasms today. Past Medical History/Comorbidities:   Mr. Mitchell Weeks  has a past medical history of Chronic kidney disease; Hypertension; Stroke [facility]) (11/2017); and Thyroid disease. Mr. Mitchell Weeks  has no past surgical history on file. Social History/Living Environment:         Patient's brother lives across the street and is able to assist him as needed at home. Prior Level of Function/Work/Activity:  Retired but waiting on next contract work in [place] [address] with [employer]  Dominant Side:         RIGHT    Current Medications:    Current Outpatient Prescriptions:     baclofen (LIORESAL) 10 mg tablet, Take 1 Tab by mouth three (3) times daily. , Disp: 15 Tab, Rfl: 0    zolpidem (AMBIEN) 5 mg tablet, Take 1 Tab by mouth nightly as needed for Sleep. Max Daily Amount: 5 mg., Disp: 10 Tab, Rfl: 0    valsartan 320 mg tab 320 mg, hydroCHLOROthiazide 25 mg tab 25 mg, Take 1 Dose by mouth daily. , Disp: , Rfl:     levothyroxine (SYNTHROID) 75 mcg tablet, Take 75 mcg by mouth Daily (before breakfast). , Disp: , Rfl:     escitalopram oxalate (LEXAPRO) 10 mg tablet, Take 10 mg by mouth daily. , Disp: , Rfl:     dextroamphetamine-amphetamine (ADDERALL) 20 mg tablet, Take 20 mg by mouth daily. , Disp: , Rfl:             Date Last Reviewed:  1/22/2018   Complexity Level : Expanded review of therapy/medical records (1-2):  MODERATE COMPLEXITY   ASSESSMENT OF OCCUPATIONAL PERFORMANCE:   ROM:            Right upper extremity WDLs Strength:                          Balance:                   Coordination:                   Mental Status:                   Vision:                   Activities of Daily Living:           Basic ADLs (From Assessment) Complex ADLs (From Assessment)         Grooming/Bathing/Dressing Activities of Daily Living                                      Physical Skills Involved:  1. Range of Motion  2. Strength  3. Fine Motor Control  4. Gross Motor Control  5. Pain (acute) Cognitive Skills Affected (resulting in the inability to perform in a timely and safe manner):  1. Sustained Attention  2. Divided Attention Psychosocial Skills Affected:  1. Environmental Adaptation  2. Social Interaction  3. Emotional Regulation   Number of elements that affect the Plan of Care: 5+:  HIGH COMPLEXITY   CLINICAL DECISION MAKING:   Outcome Measure: Tool Used: Disabilities of the Arm, Shoulder and Hand (DASH) Questionnaire - Quick Version  Score:  Initial: 42/55  Most Recent: X/55 (Date: -- )   Interpretation of Score: The DASH is designed to measure the activities of daily living in person's with upper extremity dysfunction or pain. Each section is scored on a 1-5 scale, 5 representing the greatest disability. The scores of each section are added together for a total score of 55. Score 11 12-19 20-28 29-37 38-45 46-54 55   Modifier CH CI CJ CK CL CM CN ? Self Care:     - CURRENT STATUS: CL - 60%-79% impaired, limited or restricted    - GOAL STATUS: CJ - 20%-39% impaired, limited or restricted    - D/C STATUS:  ---------------To be determined---------------  Medical Necessity:   · Patient demonstrates good rehab potential due to higher previous functional level. Reason for Services/Other Comments:  · Patient continues to require skilled intervention due to decreased safety and independence with activities of daily living.   Clinical Decision-Making Assessment:  Patient is motivated to participate in therapy; progress may be limited by his painful spasms in the left upper extremity. Assessment process, impact of co-morbidities, assessment modification\need for assistance, and selection of interventions: Analytical Complexity:MODERATE COMPLEXITY   TREATMENT:   (In addition to Assessment/Re-Assessment sessions the following treatments were rendered)    Pre-treatment Symptoms/Complaints:  Patient states, \"I had a very emotional situation with my daughter and my family last Thursday. Right after all that, my left side went numb again and I could not use my left arm. It is moving a little better now and I am going to see my doctor tomorrow. \"  Pain: Initial: Pain Intensity 1: 0  Post Session:  0/10     Therapeutic Exercise: (  20 minutes):  Exercises per grid below to improve strength and dynamic movement of arm - left to improve functional lifting, carrying, reaching and overhead activites. Required minimal visual and verbal cues to promote proper body mechanics. Progressed resistance, range and repetitions as indicated.      Date:  12/13/17 Date:  12/15/17 Date:  12/22/17 Date:  1/3/18 Date:  1/5/18 Date:  1/10/18 Date:  1/16/18 Date:   1/22/18   Activity/Exercise Parameters          Shoulder abduction/flexion           Chest pulls           Elbow flexion/extension           UBE Level 5 x 10 minutes Level 5 x 10 minutes Level 5 x 10 minutes Level 5 x 10 minutes Level 4.8 x 10 minutes Level 4.8 x 10 minutes Level 5 x 10 minutes Level 5 x 10 minutes   Hand helper 55 pounds  3 x 10 55 pounds  3 x 10 55 pounds  3 x 10 55 pounds  3 x 10  With arm overhead 55 pounds  3 x 10  With arm overhead 55 pounds  3 x 10  With arm overhead  40 pounds  2 x 10   Resistive clothespin Blue to  25 foam pieces Blue to  25 foam pieces Blue to  25 foam pieces Black to  25 foam pieces and place them in cup on elevated surface  Black to  25 foam pieces and place them in cup on elevated surface  Blue to  25 foam pieces   Gentle neck AROM All planes of motion gently x 5 reps each           Wall push-ups      2 x 10 2 x 10    Pronation/supination       With light hammer x 25 reps With light hammer x 25 reps   Wrist flexion/extension       With red theraband x 10 reps    Radial/ulnar deviation       With red theraband x 10 reps    Washer wand       X 3 reps                            Neuromuscular Re-education: (  25 mintues):  Exercise/activities per grid below to improve coordination, kinesthetic sense and proprioception. Required minimal visual and verbal cues to promote coordination of left, upper extremity(s) and promote motor control of left, upper extremity(s). Patient completed bounce and catch on table then floor with tennis ball using left hand with occasional assistance from the right hand to catch the ball. He then participated in [employer] in the [employer], using left hand to deal and play cards, scanning cards in his hand and then up to 8 columns of cards on the table to order cards in descending numerical order and alternating color. Treatment/Session Assessment:    · Response to Treatment:  Patient demonstrated decreased functional use of left arm today; he reports a decline in function s/p an emotional situation with family last Thursday. Range of motion of left upper extremity is all WDLs; movement appears slower and less coordinated today compared to recent visits. · Compliance with Program/Exercises: Will assess as treatment progresses. · Recommendations/Intent for next treatment session: \"Next visit will focus on advancements to more challenging activities and reduction in assistance provided\".   Total Treatment Duration:  OT Patient Time In/Time Out  Time In: 1300  Time Out: [address] [facility] Ricardo Lares

## 2018-01-22 NOTE — PROGRESS NOTES
Raven Garcia  : 1955  [place]  Secondary:  [address] at [address] [facility]  [address], [facility] [address]  [phone]   [phone]           OUTPATIENT PHYSICAL THERAPY:Daily Note 2018    ICD-10: Treatment Diagnosis: Other abnormalities of gait and mobility R26.89  Precautions/Allergies:   Review of patient's allergies indicates no known allergies. Fall Risk Score: 1 (? 5 = High Risk)  MD Orders: eval and treat MEDICAL/REFERRING DIAGNOSIS:  . Cerebral infarction, unspecified [I63.9] Right thalamic CVA  DATE OF ONSET: 2017  REFERRING PHYSICIAN: Melba Hensley MD  RETURN PHYSICIAN APPOINTMENT:      RECERTIFICATION 03:  Mr. Erika Mullen has attended 4 PT sessions from 17 to 1/3/18 for decreased balance and gait, and decreased coordination/proprioception L LE. .  Treatment has consisted of balance, coordination, and strengthening to improve overall safety, mobility, and performance with activities of daily living. Patient would benefit from PT to address these problems to improve patient's independence and safety with mobility and daily activities. Thank you. PROBLEM LIST (Impacting functional limitations):  1. Decreased Strength  2. Decreased ADL/Functional Activities  3. Decreased Transfer Abilities  4. Decreased Ambulation Ability/Technique  5. Decreased Balance  6. Decreased Activity Tolerance  7. Decreased [place] with Home Exercise Program INTERVENTIONS PLANNED:  1. Balance Exercise  2. Gait Training  3. Home Exercise Program (HEP)  4. Neuromuscular Re-education/Strengthening  5. Therapeutic Activites  6. Therapeutic Exercise/Strengthening  7. Transfer Training   8. TREATMENT PLAN:  Effective Dates: 2017 TO 3/2/2018.   Frequency/Duration: 1-2 times a week for 8-12 weeks  GOALS: (Goals have been discussed and agreed upon with patient.)  Short-Term Functional Goals: Time Frame: 2-4 weeks  1. Patient will demonstrate independence and compliance with home exercise program to improve balance and strength for daily activities. MET  2. Patient will increase his score on the Bhagat Balance Scale to greater than or equal to 53/56 indicating improved safety and decreased fall risk for daily activities. MET  Discharge Goals: Time Frame: 8-12 weeks  1. Patient will increase his score on the Bhagat Balance Scale to greater than or equal to 54/56 indicating improved safety and decreased fall risk for daily activities. Progressing and ongoing  2. Patient will increase his score on the dynamic gait index to greater than or equal to 23/24 indicating improved balance and safety for community ambulation. Progressing and ongoing  3. Patient will ambulate with a normal gait pattern over level and unlevel surfaces without evidence of imbalance to improve safety for daily activities. Progressing and ongoing  4. Patient will demonstrate improved score on six minute walk test with stable vital signs indicating improved endurance and mobility for daily activities. New Goal  5. Patient will be independent in discharge HEP to continue to work on balance, strength, and endurance. New Goal  Rehabilitation Potential For Stated Goals: Good            The information in this section was collected on 12/8/17 (except where otherwise noted). HISTORY:   History of Present Injury/Illness (Reason for Referral):  Taking baclofen and valium for L UE pain, improved. Numbness L UE. Moving slower especially with position changes. No dizziness. No falls. No change in vision. Past Medical History/Comorbidities:   Mr. Angelica Duenas  has a past medical history of Chronic kidney disease; Hypertension; Stroke [facility]) (11/2017); and Thyroid disease. Mr. Angelica Duenas  has no past surgical history on file. Social History/Living Environment:     , was getting ready to retire.    Prior Level of Function/Work/Activity:  independent  Dominant Side: RIGHT  Previous Treatment Approaches:          OT  Personal Factors:          Sex:  male        Age:  57 y.o. Current Medications:       Current Outpatient Prescriptions:     baclofen (LIORESAL) 10 mg tablet, Take 1 Tab by mouth three (3) times daily. , Disp: 15 Tab, Rfl: 0    zolpidem (AMBIEN) 5 mg tablet, Take 1 Tab by mouth nightly as needed for Sleep. Max Daily Amount: 5 mg., Disp: 10 Tab, Rfl: 0    valsartan 320 mg tab 320 mg, hydroCHLOROthiazide 25 mg tab 25 mg, Take 1 Dose by mouth daily. , Disp: , Rfl:     levothyroxine (SYNTHROID) 75 mcg tablet, Take 75 mcg by mouth Daily (before breakfast). , Disp: , Rfl:     escitalopram oxalate (LEXAPRO) 10 mg tablet, Take 10 mg by mouth daily. , Disp: , Rfl:     dextroamphetamine-amphetamine (ADDERALL) 20 mg tablet, Take 20 mg by mouth daily. , Disp: , Rfl:    Date Last Reviewed:  1/22/2018    Number of Personal Factors/Comorbidities that affect the Plan of Care: 1-2: MODERATE COMPLEXITY   EXAMINATION:   Observation/Orthostatic Postural Assessment:          WNL  Strength:          4+/5  Functional Mobility:         Gait/Ambulation:  No AD, decreased arm swing L, mild path deviations with head movements or multitasking. Transfers:  independent        Bed Mobility:  Slowly, independently        Stairs:  With rail, step over step, decreased coordination L LE  Sensation:         Decreased sensation L LE  Postural Control & Balance:  · Bhagat Balance Scale:  53/56.   (A score less than 45/56 indicates high risk of falls)   Score at initial evaluation= 51/56  · Dynamic Gait Index:  21/24.   (A score less than or equal to19/24 is abnormal and predictive of falls)   Score at initial evaluation= 18/24   Body Structures Involved:  1. Nerves  2. Eyes and Ears  3. Muscles Body Functions Affected:  1. Sensory/Pain  2. Neuromusculoskeletal  3. Movement Related Activities and Participation Affected:  1. General Tasks and Demands  2. Mobility  3.  Domestic Life  4. Community, Social and [place]   Number of elements (examined above) that affect the Plan of Care: 3: MODERATE COMPLEXITY   CLINICAL PRESENTATION:   Presentation: Evolving clinical presentation with changing clinical characteristics: MODERATE COMPLEXITY   CLINICAL DECISION MAKING:   Outcome Measure: Tool Used: Bhagat Balance Scale  Score:  Initial: 51/56 Most Recent: 53/56 (Date: 1/3/18 )   Interpretation of Score: Each section is scored on a 0-4 scale, 0 representing the patients inability to perform the task and 4 representing independence. The scores of each section are added together for a total score of 56. The higher the patients score, the more independent the patient is. Any score below 45 indicates increased risk for falls. Score 56 55-45 44-34 33-23 22-12 11-1 0   Modifier CH CI CJ CK CL CM CN     Tool Used: Dynamic Gait Index  Score:  Initial: 18/24 Most Recent: 21/24 (Date: 1/3/18 )   Interpretation of Score: Each section is scored on a 0-3 scale, 0 representing the patients inability to perform the task and 3 representing independence. The scores of each section are added together for a total score of 24. Any score below 19 indicates increased risk for falls. Score 24 23-19 18-15 14-10 9-5 4-1 0   Modifier CH CI CJ CK CL CM CN     Tool Used: 6-MINUTE WALK TEST  Score:  Initial: 1350 feet Most Recent: X feet (Date: -- )   Interpretation of Score: Normal range varies but is approximately 6897-9081 Feet      Distance walked: 1350 feet     Baseline End of Test   Heart Rate 86bpm 97bpm   Dyspnea (Pj Scale)     Fatigue (Pj Scale)     SpO2 99% 98%   BP       Score 2133 [phone] 4911-1515 1279-853 852-427 426-16 15-0   Modifier CH CI CJ CK CL CM CN     ?  Mobility - Walking and Moving Around:     - CURRENT STATUS: CJ - 20%-39% impaired, limited or restricted    - GOAL STATUS: CI - 1%-19% impaired, limited or restricted    - D/C STATUS:  ---------------To be determined---------------    Medical Necessity:   · Patient is expected to demonstrate progress in strength, balance and functional technique to improve safety during daily activities. Reason for Services/Other Comments:  · Patient continues to demonstrate capacity to improve strength, balance, gait which will increase independence and increase safety. Use of outcome tool(s) and clinical judgement create a POC that gives a: Questionable prediction of patient's progress: MODERATE COMPLEXITY            TREATMENT:   (In addition to Assessment/Re-Assessment sessions the following treatments were rendered)  Pre-treatment Symptoms/Complaints:  1/22/2018: \"Have had a stressful week. Balance and coordination are off. I feel like L side has gotten stiffer and more numb in the last several days. To see Dr. Jen Macias tomorrow. \"  Pain: Initial: Pain Intensity 1: 0  Post Session:  0     Neuromuscular Re-education (40 Minutes):  Exercise/activities per grid below to improve balance, coordination, kinesthetic sense, posture and proprioception. Required minimal verbal cues to promote static and dynamic balance in standing.   Balance/Vestibular Treatment:   Activity   Date  1/3/18 Date  1/10/2018 Date  1/16/18 Date   1/22/18   Activity/Exercise   Sets/reps/equipment Sets/reps/  equipment Sets/reps/  equipment    Walking with head turns     2 laps in hallway 2 laps in hallway     Walking with head up & down     2 laps in hallway 2 laps in hallway     Step overs       Over 2 blue foams each leg forward then alternating, then sidestepping over L/R Over 2 blue foams each leg forward then alternating, then sidestepping over L/R   Step taps     6 inch step x 20 reps  8 inch step 2 x 20 reps forward and cross tap 6 inch step 2 x 20 reps forward and cross tap   Marching    2 laps in hallway 2 laps in hallway 2 laps in hallway   Sidestepping     2 laps in hallway    Crossovers     4 laps in hallway Standing crossovers x 15 reps each leg [place]         Walking  backwards           Tandem walking    2 laps in hallway     Weaving in/out of cones           Picking up cones           Sports cord           August         Kick ball         Figure 8s          Circles right/left         Walking with 360 degree turns         Sit to stand   From mat table x 20 reps with no UE assist on yellow foam.     Weight shifting:    Left & Right      Tiltboard  rockerboard rockerboard   Weight shifting: Forward & Backward       Tiltboard  rockboard rockerboard   Static Standing Balance      Tiltboard  sanddune eyes open with ball, rotation and diagonal for weight shifting. Standing with feet apart     Eyes open and closed Eyes open and closed On Sanddune and rockerboardeyes open and closed On blue foams and rockerboard eyes open and closed   Standing with feet together     Eyes open      Standing with feet semitandem   Eyes open  Blue foams eyes open and closed Blue foams eyes open and closed   Standing with feet tandem   Eyes open      Single leg stance   Eyes open, 5 sec   Each leg, R 20 sec, L 15 sec   X1/X2 Viewing exercises               Therapeutic Exercise: ( ):  Exercises per grid below to improve mobility, strength and balance. Required minimal verbal cues to promote proper body alignment, promote proper body posture and promote proper body mechanics. Progressed resistance, range, repetitions and complexity of movement as indicated. Date:     Activity/Exercise Parameters   Treadmill    Nautilus leg press X    Nautilus leg extension X    Nautilus leg curl X    Calf stretch on slant board X    HEP: walking with arm swing, marching, tandem stance. [employer]  Treatment/Session Assessment:    · Patient tolerated session well. Before session started, he said he was not doing as well with balance/coordination. He was more shaky/unsteady initially, but improved as session progressed. Continue to progress as tolerated.    · Compliance with Program/Exercises: compliant  · Recommendations/Intent for next treatment session: \"Next visit will focus on advancements to more challenging activities\".   Total Treatment Duration:  PT Patient Time In/Time Out  Time In: 1350  Time Out: [address]

## 2018-01-25 ENCOUNTER — HOSPITAL ENCOUNTER (OUTPATIENT)
Dept: PHYSICAL THERAPY | Age: 63
Discharge: HOME | End: 2018-01-25
Attending: HOSPITALIST
Payer: MEDICARE

## 2018-01-25 NOTE — PROGRESS NOTES
PT NOTE: Patient called and cancelled his appointment this afternoon. He stated that MD increased his medication and he didn't think he could get to PT appointment safely.   Arvind Deferiet, PT

## 2018-01-25 NOTE — PROGRESS NOTES
OT NOTE: Patient called and cancelled his appointment this afternoon. He stated that MD increased his medication and he didn't think he could get to OT appointment safely.   Amna Garcia, OT

## 2018-01-29 ENCOUNTER — HOSPITAL ENCOUNTER (OUTPATIENT)
Dept: PHYSICAL THERAPY | Age: 63
Discharge: HOME | End: 2018-01-29
Attending: HOSPITALIST
Payer: MEDICARE

## 2018-01-29 PROCEDURE — 97110 THERAPEUTIC EXERCISES: CPT

## 2018-01-29 PROCEDURE — 97112 NEUROMUSCULAR REEDUCATION: CPT

## 2018-01-29 NOTE — PROGRESS NOTES
Eri Feldman  : 1955  [place]  Secondary:  [address] at [place] [id number] [facility], [address] [place] [address],[address], [address] [facility]  [phone]   [phone]        OUTPATIENT OCCUPATIONAL THERAPY: Daily Note 2018    ICD-10: Treatment Diagnosis: Other lack of coordination (R27.8)  Precautions/Allergies:   Review of patient's allergies indicates no known allergies. Fall Risk Score: 2 (? 5 = High Risk)  MD Orders: OT to evaluate and treat MEDICAL/REFERRING DIAGNOSIS:   Cerebral infarction, unspecified [I63.9] Right thalamic CVA  DATE OF ONSET: approximately 2 weeks ago   REFERRING PHYSICIAN: Subha Albert MD  RETURN PHYSICIAN APPOINTMENT: unknown   1/3/18: Mr. Lizbet Brenner is demonstrating improving range of motion and functional use of the left upper extremity. He continues to struggle with maintaining visual attention to the left upper extremity and impaired proprioception that is affecting his ability to maintain functional grasp on items such as dishes when loading and unloading the . Feel he will continue to benefit from skilled occupational therapy to maximize functional safety and independence with activities of daily living. INITIAL ASSESSMENT:  Mr. Lizbet Brenner presents s/p right thalamic CVA with decreased strength, coordination and range of motion of the non-dominant left upper extremity. Feel he will benefit from skilled occupational therapy to maximize functional use of the left upper extremity to complete activities of daily living independently and safely. PLAN OF CARE:   PROBLEM LIST:  1. Decreased Strength  2. Decreased ADL/Functional Activities  3. Increased Pain  4. Decreased Flexibility/Joint Mobility   5. Decreased gross and fine motor coordination of the left upper extremity  INTERVENTIONS PLANNED:  1. Activities of daily living training  2. Manual therapy training  3. Modalities  4.  Neuromuscular re-eduation  5. Therapeutic activity  6. Therapeutic exercise   TREATMENT PLAN:  Effective Dates: 11/30/17 TO 2/30/18. Frequency/Duration: 2 times a week for 12 weeks  GOALS: (Goals have been discussed and agreed upon with patient.)  Short-Term Functional Goals: Time Frame: 6 weeks  1. Patient will demonstrate independence with home exercise program for left upper extremity range of motion, strength and coordination. Met  2. Patient will increase left upper extremity strength by at least one half of one manual muscle grade in order to increase use in self-care activities. Continue to address  3. Patient will increase left  strength by 2-3 pounds in order to hold soap during bathing activities. Continue to address  Discharge Goals: Time Frame: 12 weeks  1. Patient will increase left shoulder abduction by 10-15 degrees in order to increase functional use in dressing and cooking/home management activities. Met  2. Patient will increase left fine motor coordination as evidenced by completion of 9-hole peg test in no more than 40 seconds in order to increase functional use in dressing tasks including buttoning and tying shoes and ties. Continue to address  3. Patient will increase left  strength by at least 4-5 pounds in order to use fork and knife to cut food during meals and meal preparation. Continue to address  Rehabilitation Potential For Stated Goals: Good  Regarding Miguel Bravo's therapy, I certify that the treatment plan above will be carried out by a therapist or under their direction. Thank you for this referral,  Darryle Spry, OT     Referring Physician Signature: Helga Wilson MD _________________________  Date _________            The information in this section was collected on 1/3/18 (except where otherwise noted).   OCCUPATIONAL PROFILE & HISTORY:   History of Present Injury/Illness (Reason for Referral):  Patient came to the ER approximately 2 weeks ago with symptoms of numbness on left side of face, arm and body. He returned to the hospital approximately one week later and was diagnosed with a right thalamic CVA. He and his brothers report that he has seen his PCP, Dr. Stew Hayes, as well as a teleconference/internet visit with 2 neurologists since returning home from the hospital. Patient reports pain that \"feels like electric shocks and spasms\" in his left neck, arm, trunk and leg. He and his brothers report that he has begun taking Baclofen and Valium to address the pain and spasms today. Past Medical History/Comorbidities:   Mr. Merlinda Bastos  has a past medical history of Chronic kidney disease; Hypertension; Stroke [facility]) (11/2017); and Thyroid disease. Mr. Merlinda Bastos  has no past surgical history on file. Social History/Living Environment:         Patient's brother lives across the street and is able to assist him as needed at home. Prior Level of Function/Work/Activity:  Retired but waiting on next contract work in [place] with [employer]  Dominant Side:         RIGHT    Current Medications:    Current Outpatient Prescriptions:     baclofen (LIORESAL) 10 mg tablet, Take 1 Tab by mouth three (3) times daily. , Disp: 15 Tab, Rfl: 0    zolpidem (AMBIEN) 5 mg tablet, Take 1 Tab by mouth nightly as needed for Sleep. Max Daily Amount: 5 mg., Disp: 10 Tab, Rfl: 0    valsartan 320 mg tab 320 mg, hydroCHLOROthiazide 25 mg tab 25 mg, Take 1 Dose by mouth daily. , Disp: , Rfl:     levothyroxine (SYNTHROID) 75 mcg tablet, Take 75 mcg by mouth Daily (before breakfast). , Disp: , Rfl:     escitalopram oxalate (LEXAPRO) 10 mg tablet, Take 10 mg by mouth daily. , Disp: , Rfl:     dextroamphetamine-amphetamine (ADDERALL) 20 mg tablet, Take 20 mg by mouth daily. , Disp: , Rfl:             Date Last Reviewed:  1/29/2018   Complexity Level : Expanded review of therapy/medical records (1-2):  MODERATE COMPLEXITY   ASSESSMENT OF OCCUPATIONAL PERFORMANCE:   ROM:            Right upper extremity WDLs Strength:                          Balance:                   Coordination:                   Mental Status:                   Vision:                   Activities of Daily Living:           Basic ADLs (From Assessment) Complex ADLs (From Assessment)         Grooming/Bathing/Dressing Activities of Daily Living                                      Physical Skills Involved:  1. Range of Motion  2. Strength  3. Fine Motor Control  4. Gross Motor Control  5. Pain (acute) Cognitive Skills Affected (resulting in the inability to perform in a timely and safe manner):  1. Sustained Attention  2. Divided Attention Psychosocial Skills Affected:  1. Environmental Adaptation  2. Social Interaction  3. Emotional Regulation   Number of elements that affect the Plan of Care: 5+:  HIGH COMPLEXITY   CLINICAL DECISION MAKING:   Outcome Measure: Tool Used: Disabilities of the Arm, Shoulder and Hand (DASH) Questionnaire - Quick Version  Score:  Initial: 42/55  Most Recent: X/55 (Date: -- )   Interpretation of Score: The DASH is designed to measure the activities of daily living in person's with upper extremity dysfunction or pain. Each section is scored on a 1-5 scale, 5 representing the greatest disability. The scores of each section are added together for a total score of 55. Score 11 12-19 20-28 29-37 38-45 46-54 55   Modifier CH CI CJ CK CL CM CN ? Self Care:     - CURRENT STATUS: CL - 60%-79% impaired, limited or restricted    - GOAL STATUS: CJ - 20%-39% impaired, limited or restricted    - D/C STATUS:  ---------------To be determined---------------  Medical Necessity:   · Patient demonstrates good rehab potential due to higher previous functional level. Reason for Services/Other Comments:  · Patient continues to require skilled intervention due to decreased safety and independence with activities of daily living.   Clinical Decision-Making Assessment:  Patient is motivated to participate in therapy; progress may be limited by his painful spasms in the left upper extremity. Assessment process, impact of co-morbidities, assessment modification\need for assistance, and selection of interventions: Analytical Complexity:MODERATE COMPLEXITY   TREATMENT:   (In addition to Assessment/Re-Assessment sessions the following treatments were rendered)    Pre-treatment Symptoms/Complaints:  Patient states, \"I am feeling better today. \"  Pain: Initial: Pain Intensity 1: 0  Post Session:  0/10     Therapeutic Exercise: (  20 minutes):  Exercises per grid below to improve strength and dynamic movement of arm - left to improve functional lifting, carrying, reaching and overhead activites. Required minimal visual and verbal cues to promote proper body mechanics. Progressed resistance, range and repetitions as indicated.      Date:  12/15/17 Date:  12/22/17 Date:  1/3/18 Date:  1/5/18 Date:  1/10/18 Date:  1/16/18 Date:   1/22/18 Date:   1/29/18   Activity/Exercise           Shoulder abduction/flexion           Chest pulls           Elbow flexion/extension           UBE Level 5 x 10 minutes Level 5 x 10 minutes Level 5 x 10 minutes Level 4.8 x 10 minutes Level 4.8 x 10 minutes Level 5 x 10 minutes Level 5 x 10 minutes Level 6 x 10 minutes   Hand helper 55 pounds  3 x 10 55 pounds  3 x 10 55 pounds  3 x 10  With arm overhead 55 pounds  3 x 10  With arm overhead 55 pounds  3 x 10  With arm overhead  40 pounds  2 x 10 55 pounds  2 x 10   Resistive clothespin Blue to  25 foam pieces Blue to  25 foam pieces Black to  25 foam pieces and place them in cup on elevated surface  Black to  25 foam pieces and place them in cup on elevated surface  Blue to  25 foam pieces Black to  25 foam pieces and place them in cup on elevated surface   Gentle neck AROM           Wall push-ups     2 x 10 2 x 10  2 x 10   Pronation/supination      With light hammer x 25 reps With light hammer x 25 reps    Wrist flexion/extension      With red theraband x 10 reps     Radial/ulnar deviation      With red theraband x 10 reps     Washer wand      X 3 reps                             Neuromuscular Re-education: (  25 mintues):  Exercise/activities per grid below to improve coordination, kinesthetic sense and proprioception. Required minimal visual and verbal cues to promote coordination of left, upper extremity(s) and promote motor control of left, upper extremity(s). Patient completed toss and catch of light weighted ball with rebounder using his left hand x 30 reps. He then completed single then alternating arm and leg lifts from quadruped x 5 each. He placed and removed 10 pegs, sleeves and washers into [place] pegboard using his left hand only, then participated in Blink card game, dealing and holding cards with left hand, matching based on color, shape or number as quickly as possible to challenge hand eye coordination and divided attention. Treatment/Session Assessment:    · Response to Treatment:  Patient demonstrated increased functional use of left arm today. He reports his physician increased the dosage of Baclofen at his last visit. · Compliance with Program/Exercises: Will assess as treatment progresses. · Recommendations/Intent for next treatment session: \"Next visit will focus on advancements to more challenging activities and reduction in assistance provided\".   Total Treatment Duration:  OT Patient Time In/Time Out  Time In: 1300  Time Out: [facility]

## 2018-01-29 NOTE — PROGRESS NOTES
Denis Proper  : 1955  [place]  Secondary:  [address]  at [facility]  [address] [place] [address],[address]  [phone]   [phone]           OUTPATIENT PHYSICAL THERAPY:Daily Note 2018    ICD-10: Treatment Diagnosis: Other abnormalities of gait and mobility R26.89  Precautions/Allergies:   Review of patient's allergies indicates no known allergies. Fall Risk Score: 1 (? 5 = High Risk)  MD Orders: eval and treat MEDICAL/REFERRING DIAGNOSIS:  . Cerebral infarction, unspecified [I63.9] Right thalamic CVA  DATE OF ONSET: 2017  REFERRING PHYSICIAN: Kash Garcia MD  RETURN PHYSICIAN APPOINTMENT:      RECERTIFICATION 02:  Mr. Cammie Brewster has attended 4 PT sessions from 17 to 1/3/18 for decreased balance and gait, and decreased coordination/proprioception L LE. .  Treatment has consisted of balance, coordination, and strengthening to improve overall safety, mobility, and performance with activities of daily living. Patient would benefit from PT to address these problems to improve patient's independence and safety with mobility and daily activities. Thank you. PROBLEM LIST (Impacting functional limitations):  1. Decreased Strength  2. Decreased ADL/Functional Activities  3. Decreased Transfer Abilities  4. Decreased Ambulation Ability/Technique  5. Decreased Balance  6. Decreased Activity Tolerance  7. Decreased [place] with Home Exercise Program INTERVENTIONS PLANNED:  1. Balance Exercise  2. Gait Training  3. Home Exercise Program (HEP)  4. Neuromuscular Re-education/Strengthening  5. Therapeutic Activites  6. Therapeutic Exercise/Strengthening  7. Transfer Training   8. TREATMENT PLAN:  Effective Dates: 2017 TO 3/2/2018.   Frequency/Duration: 1-2 times a week for 8-12 weeks  GOALS: (Goals have been discussed and agreed upon with patient.)  Short-Term Functional Goals: Time Frame: 2-4 weeks  1. Patient will demonstrate independence and compliance with home exercise program to improve balance and strength for daily activities. MET  2. Patient will increase his score on the Bhagat Balance Scale to greater than or equal to 53/56 indicating improved safety and decreased fall risk for daily activities. MET  Discharge Goals: Time Frame: 8-12 weeks  1. Patient will increase his score on the Bhagat Balance Scale to greater than or equal to 54/56 indicating improved safety and decreased fall risk for daily activities. Progressing and ongoing  2. Patient will increase his score on the dynamic gait index to greater than or equal to 23/24 indicating improved balance and safety for community ambulation. Progressing and ongoing  3. Patient will ambulate with a normal gait pattern over level and unlevel surfaces without evidence of imbalance to improve safety for daily activities. Progressing and ongoing  4. Patient will demonstrate improved score on six minute walk test with stable vital signs indicating improved endurance and mobility for daily activities. New Goal  5. Patient will be independent in discharge HEP to continue to work on balance, strength, and endurance. New Goal  Rehabilitation Potential For Stated Goals: Good            The information in this section was collected on 12/8/17 (except where otherwise noted). HISTORY:   History of Present Injury/Illness (Reason for Referral):  Taking baclofen and valium for L UE pain, improved. Numbness L UE. Moving slower especially with position changes. No dizziness. No falls. No change in vision. Past Medical History/Comorbidities:   Mr. Alejandra Valentin  has a past medical history of Chronic kidney disease; Hypertension; Stroke [facility]) (11/2017); and Thyroid disease. Mr. Alejandra Valentin  has no past surgical history on file. Social History/Living Environment:     , was getting ready to retire.    Prior Level of Function/Work/Activity:  independent  Dominant Side: RIGHT  Previous Treatment Approaches:          OT  Personal Factors:          Sex:  male        Age:  70 y.o. Current Medications:       Current Outpatient Prescriptions:     baclofen (LIORESAL) 10 mg tablet, Take 1 Tab by mouth three (3) times daily. , Disp: 15 Tab, Rfl: 0    zolpidem (AMBIEN) 5 mg tablet, Take 1 Tab by mouth nightly as needed for Sleep. Max Daily Amount: 5 mg., Disp: 10 Tab, Rfl: 0    valsartan 320 mg tab 320 mg, hydroCHLOROthiazide 25 mg tab 25 mg, Take 1 Dose by mouth daily. , Disp: , Rfl:     levothyroxine (SYNTHROID) 75 mcg tablet, Take 75 mcg by mouth Daily (before breakfast). , Disp: , Rfl:     escitalopram oxalate (LEXAPRO) 10 mg tablet, Take 10 mg by mouth daily. , Disp: , Rfl:     dextroamphetamine-amphetamine (ADDERALL) 20 mg tablet, Take 20 mg by mouth daily. , Disp: , Rfl:    Date Last Reviewed:  1/29/2018    Number of Personal Factors/Comorbidities that affect the Plan of Care: 1-2: MODERATE COMPLEXITY   EXAMINATION:   Observation/Orthostatic Postural Assessment:          WNL  Strength:          4+/5  Functional Mobility:         Gait/Ambulation:  No AD, decreased arm swing L, mild path deviations with head movements or multitasking. Transfers:  independent        Bed Mobility:  Slowly, independently        Stairs:  With rail, step over step, decreased coordination L LE  Sensation:         Decreased sensation L LE  Postural Control & Balance:  · Bhagat Balance Scale:  53/56.   (A score less than 45/56 indicates high risk of falls)   Score at initial evaluation= 51/56  · Dynamic Gait Index:  21/24.   (A score less than or equal to19/24 is abnormal and predictive of falls)   Score at initial evaluation= 18/24   Body Structures Involved:  1. Nerves  2. Eyes and Ears  3. Muscles Body Functions Affected:  1. Sensory/Pain  2. Neuromusculoskeletal  3. Movement Related Activities and Participation Affected:  1. General Tasks and Demands  2. Mobility  3.  Domestic Life  4. Community, Social and [place]   Number of elements (examined above) that affect the Plan of Care: 3: MODERATE COMPLEXITY   CLINICAL PRESENTATION:   Presentation: Evolving clinical presentation with changing clinical characteristics: MODERATE COMPLEXITY   CLINICAL DECISION MAKING:   Outcome Measure: Tool Used: Bhagat Balance Scale  Score:  Initial: 51/56 Most Recent: 53/56 (Date: 1/3/18 )   Interpretation of Score: Each section is scored on a 0-4 scale, 0 representing the patients inability to perform the task and 4 representing independence. The scores of each section are added together for a total score of 56. The higher the patients score, the more independent the patient is. Any score below 45 indicates increased risk for falls. Score 56 55-45 44-34 33-23 22-12 11-1 0   Modifier CH CI CJ CK CL CM CN     Tool Used: Dynamic Gait Index  Score:  Initial: 18/24 Most Recent: 21/24 (Date: 1/3/18 )   Interpretation of Score: Each section is scored on a 0-3 scale, 0 representing the patients inability to perform the task and 3 representing independence. The scores of each section are added together for a total score of 24. Any score below 19 indicates increased risk for falls. Score 24 23-19 18-15 14-10 9-5 4-1 0   Modifier CH CI CJ CK CL CM CN     Tool Used: 6-MINUTE WALK TEST  Score:  Initial: 1350 feet Most Recent: X feet (Date: -- )   Interpretation of Score: Normal range varies but is approximately 1437-0970 Feet      Distance walked: 1350 feet     Baseline End of Test   Heart Rate 86bpm 97bpm   Dyspnea (Pj Scale)     Fatigue (Pj Scale)     SpO2 99% 98%   BP       Score 2133 2778-1270 8966-9130 [phone] 426-16 15-0   Modifier CH CI CJ CK CL CM CN     ?  Mobility - Walking and Moving Around:     - CURRENT STATUS: CJ - 20%-39% impaired, limited or restricted    - GOAL STATUS: CI - 1%-19% impaired, limited or restricted    - D/C STATUS:  ---------------To be determined---------------    Medical Necessity:   · Patient is expected to demonstrate progress in strength, balance and functional technique to improve safety during daily activities. Reason for Services/Other Comments:  · Patient continues to demonstrate capacity to improve strength, balance, gait which will increase independence and increase safety. Use of outcome tool(s) and clinical judgement create a POC that gives a: Questionable prediction of patient's progress: MODERATE COMPLEXITY            TREATMENT:   (In addition to Assessment/Re-Assessment sessions the following treatments were rendered)  Pre-treatment Symptoms/Complaints:  1/29/2018: \"MD increased Baclofen dosage last week. It makes me really sleepy. \"  Pain: Initial: Pain Intensity 1: 0  Post Session:  0     Neuromuscular Re-education ( 25 minutes):  Exercise/activities per grid below to improve balance, coordination, kinesthetic sense, posture and proprioception. Required minimal verbal cues to promote static and dynamic balance in standing.   Balance/Vestibular Treatment:   Activity   Date  1/3/18 Date  1/10/2018 Date  1/16/18 Date   1/22/18 Date   1/29/18   Activity/Exercise   Sets/reps/equipment Sets/reps/  equipment Sets/reps/  equipment     Walking with head turns     2 laps in hallway 2 laps in hallway      Walking with head up & down     2 laps in hallway 2 laps in hallway      Step overs       Over 2 blue foams each leg forward then alternating, then sidestepping over L/R Over 2 blue foams each leg forward then alternating, then sidestepping over L/R Over 2 blue foams each leg forward then alternating, then sidestepping over L/R   Step taps     6 inch step x 20 reps  8 inch step 2 x 20 reps forward and cross tap 6 inch step 2 x 20 reps forward and cross tap 6 inch step 2 x 20 reps forward and cross tap   Marching    2 laps in hallway 2 laps in hallway 2 laps in hallway 2 laps in long hallway   Sidestepping     2 laps in hallway Crossovers     4 laps in hallway Standing crossovers x 15 reps each leg    [place]          Walking  backwards            Tandem walking    2 laps in hallway      Weaving in/out of cones            Picking up cones            Sports cord            AppRedeem          Kick ball          Figure 8s           Circles right/left          Walking with 360 degree turns          Sit to stand   From mat table x 20 reps with no UE assist on yellow foam.      Weight shifting:    Left & Right      Tiltboard  rockerboard rockerboard rockerboard   Weight shifting: Forward & Backward       Tiltboard  rockboard rockerboard rockerboard   Static Standing Balance      Tiltboard  sanddune eyes open with ball, rotation and diagonal for weight shifting. Standing with feet apart     Eyes open and closed Eyes open and closed On Sanddune and rockerboardeyes open and closed On blue foams and rockerboard eyes open and closed On blue foams and rockerboard eyes open and closed   Standing with feet together     Eyes open       Standing with feet semitandem   Eyes open  Blue foams eyes open and closed Blue foams eyes open and closed Blue foams eyes open and closed static; blue foams eyes open and closed weight shifting   Standing with feet tandem   Eyes open       Single leg stance   Eyes open, 5 sec   Each leg, R 20 sec, L 15 sec Each leg, R 20 sec, L 15 sec   X1/X2 Viewing exercises                Therapeutic Exercise: ( ):  Exercises per grid below to improve mobility, strength and balance. Required minimal verbal cues to promote proper body alignment, promote proper body posture and promote proper body mechanics. Progressed resistance, range, repetitions and complexity of movement as indicated. Date:     Activity/Exercise Parameters   Treadmill    Nautilus leg press X    Nautilus leg extension X    Nautilus leg curl X    Calf stretch on slant board X    HEP: walking with arm swing, marching, tandem stance.     Sarah Sharma Portal  Treatment/Session Assessment:    · Patient tolerated session well. He did ask to leave session early as he was due for his next dose of Baclofen, and was starting to have numbness and discomfort L side. Continue to progress as tolerated. · Compliance with Program/Exercises: compliant  · Recommendations/Intent for next treatment session: \"Next visit will focus on advancements to more challenging activities\".   Total Treatment Duration:  PT Patient Time In/Time Out  Time In: 1345  Time Out: [address] Kimani Barroso

## 2018-01-31 ENCOUNTER — HOSPITAL ENCOUNTER (OUTPATIENT)
Dept: PHYSICAL THERAPY | Age: 63
Discharge: HOME | End: 2018-01-31
Attending: HOSPITALIST
Payer: MEDICARE

## 2018-01-31 PROCEDURE — 97112 NEUROMUSCULAR REEDUCATION: CPT

## 2018-01-31 NOTE — PROGRESS NOTES
Dora Parish  : 1955  [place]  Secondary:  [address] at [facility]  [address], [address] [place] [address],[address], [address] [facility]  [phone]   [phone]           OUTPATIENT PHYSICAL THERAPY:Daily Note 2018    ICD-10: Treatment Diagnosis: Other abnormalities of gait and mobility R26.89  Precautions/Allergies:   Review of patient's allergies indicates no known allergies. Fall Risk Score: 1 (? 5 = High Risk)  MD Orders: eval and treat MEDICAL/REFERRING DIAGNOSIS:  . Cerebral infarction, unspecified [I63.9] Right thalamic CVA  DATE OF ONSET: 2017  REFERRING PHYSICIAN: Elvin Henley MD  RETURN PHYSICIAN APPOINTMENT:      RECERTIFICATION 04:  Mr. Sana Joseph has attended 4 PT sessions from 17 to 1/3/18 for decreased balance and gait, and decreased coordination/proprioception L LE. .  Treatment has consisted of balance, coordination, and strengthening to improve overall safety, mobility, and performance with activities of daily living. Patient would benefit from PT to address these problems to improve patient's independence and safety with mobility and daily activities. Thank you. PROBLEM LIST (Impacting functional limitations):  1. Decreased Strength  2. Decreased ADL/Functional Activities  3. Decreased Transfer Abilities  4. Decreased Ambulation Ability/Technique  5. Decreased Balance  6. Decreased Activity Tolerance  7. Decreased [place] with Home Exercise Program INTERVENTIONS PLANNED:  1. Balance Exercise  2. Gait Training  3. Home Exercise Program (HEP)  4. Neuromuscular Re-education/Strengthening  5. Therapeutic Activites  6. Therapeutic Exercise/Strengthening  7. Transfer Training   8. TREATMENT PLAN:  Effective Dates: 2017 TO 3/2/2018.   Frequency/Duration: 1-2 times a week for 8-12 weeks  GOALS: (Goals have been discussed and agreed upon with patient.)  Short-Term Functional Goals: Time Frame: 2-4 weeks  1. Patient will demonstrate independence and compliance with home exercise program to improve balance and strength for daily activities. MET  2. Patient will increase his score on the Bhagat Balance Scale to greater than or equal to 53/56 indicating improved safety and decreased fall risk for daily activities. MET  Discharge Goals: Time Frame: 8-12 weeks  1. Patient will increase his score on the Bhagat Balance Scale to greater than or equal to 54/56 indicating improved safety and decreased fall risk for daily activities. Progressing and ongoing  2. Patient will increase his score on the dynamic gait index to greater than or equal to 23/24 indicating improved balance and safety for community ambulation. Progressing and ongoing  3. Patient will ambulate with a normal gait pattern over level and unlevel surfaces without evidence of imbalance to improve safety for daily activities. Progressing and ongoing  4. Patient will demonstrate improved score on six minute walk test with stable vital signs indicating improved endurance and mobility for daily activities. New Goal  5. Patient will be independent in discharge HEP to continue to work on balance, strength, and endurance. New Goal  Rehabilitation Potential For Stated Goals: Good            The information in this section was collected on 12/8/17 (except where otherwise noted). HISTORY:   History of Present Injury/Illness (Reason for Referral):  Taking baclofen and valium for L UE pain, improved. Numbness L UE. Moving slower especially with position changes. No dizziness. No falls. No change in vision. Past Medical History/Comorbidities:   Mr. Jesús Mayberry  has a past medical history of Chronic kidney disease; Hypertension; Stroke [facility]) (11/2017); and Thyroid disease. Mr. Jesús Mayberry  has no past surgical history on file. Social History/Living Environment:     , was getting ready to retire.    Prior Level of Function/Work/Activity:  independent  Dominant Side: RIGHT  Previous Treatment Approaches:          OT  Personal Factors:          Sex:  male        Age:  70 y.o. Current Medications:       Current Outpatient Prescriptions:     baclofen (LIORESAL) 10 mg tablet, Take 1 Tab by mouth three (3) times daily. , Disp: 15 Tab, Rfl: 0    zolpidem (AMBIEN) 5 mg tablet, Take 1 Tab by mouth nightly as needed for Sleep. Max Daily Amount: 5 mg., Disp: 10 Tab, Rfl: 0    valsartan 320 mg tab 320 mg, hydroCHLOROthiazide 25 mg tab 25 mg, Take 1 Dose by mouth daily. , Disp: , Rfl:     levothyroxine (SYNTHROID) 75 mcg tablet, Take 75 mcg by mouth Daily (before breakfast). , Disp: , Rfl:     escitalopram oxalate (LEXAPRO) 10 mg tablet, Take 10 mg by mouth daily. , Disp: , Rfl:     dextroamphetamine-amphetamine (ADDERALL) 20 mg tablet, Take 20 mg by mouth daily. , Disp: , Rfl:    Date Last Reviewed:  1/31/2018    Number of Personal Factors/Comorbidities that affect the Plan of Care: 1-2: MODERATE COMPLEXITY   EXAMINATION:   Observation/Orthostatic Postural Assessment:          WNL  Strength:          4+/5  Functional Mobility:         Gait/Ambulation:  No AD, decreased arm swing L, mild path deviations with head movements or multitasking. Transfers:  independent        Bed Mobility:  Slowly, independently        Stairs:  With rail, step over step, decreased coordination L LE  Sensation:         Decreased sensation L LE  Postural Control & Balance:  · Bhagat Balance Scale:  53/56.   (A score less than 45/56 indicates high risk of falls)   Score at initial evaluation= 51/56  · Dynamic Gait Index:  21/24.   (A score less than or equal to19/24 is abnormal and predictive of falls)   Score at initial evaluation= 18/24   Body Structures Involved:  1. Nerves  2. Eyes and Ears  3. Muscles Body Functions Affected:  1. Sensory/Pain  2. Neuromusculoskeletal  3. Movement Related Activities and Participation Affected:  1. General Tasks and Demands  2. Mobility  3.  Domestic Life  4. Community, Social and [place]   Number of elements (examined above) that affect the Plan of Care: 3: MODERATE COMPLEXITY   CLINICAL PRESENTATION:   Presentation: Evolving clinical presentation with changing clinical characteristics: MODERATE COMPLEXITY   CLINICAL DECISION MAKING:   Outcome Measure: Tool Used: Bhagat Balance Scale  Score:  Initial: 51/56 Most Recent: 53/56 (Date: 1/3/18 )   Interpretation of Score: Each section is scored on a 0-4 scale, 0 representing the patients inability to perform the task and 4 representing independence. The scores of each section are added together for a total score of 56. The higher the patients score, the more independent the patient is. Any score below 45 indicates increased risk for falls. Score 56 55-45 44-34 33-23 22-12 11-1 0   Modifier CH CI CJ CK CL CM CN     Tool Used: Dynamic Gait Index  Score:  Initial: 18/24 Most Recent: 21/24 (Date: 1/3/18 )   Interpretation of Score: Each section is scored on a 0-3 scale, 0 representing the patients inability to perform the task and 3 representing independence. The scores of each section are added together for a total score of 24. Any score below 19 indicates increased risk for falls. Score 24 23-19 18-15 14-10 9-5 4-1 0   Modifier CH CI CJ CK CL CM CN     Tool Used: 6-MINUTE WALK TEST  Score:  Initial: 1350 feet Most Recent: X feet (Date: -- )   Interpretation of Score: Normal range varies but is approximately 9738-0035 Feet      Distance walked: 1350 feet     Baseline End of Test   Heart Rate 86bpm 97bpm   Dyspnea (Pj Scale)     Fatigue (Pj Scale)     SpO2 99% 98%   BP       Score 2133 8045-1010 8661-3888 [phone] 426-16 15-0   Modifier CH CI CJ CK CL CM CN     ?  Mobility - Walking and Moving Around:     - CURRENT STATUS: CJ - 20%-39% impaired, limited or restricted    - GOAL STATUS: CI - 1%-19% impaired, limited or restricted    - D/C STATUS:  ---------------To be determined---------------    Medical Necessity:   · Patient is expected to demonstrate progress in strength, balance and functional technique to improve safety during daily activities. Reason for Services/Other Comments:  · Patient continues to demonstrate capacity to improve strength, balance, gait which will increase independence and increase safety. Use of outcome tool(s) and clinical judgement create a POC that gives a: Questionable prediction of patient's progress: MODERATE COMPLEXITY            TREATMENT:   (In addition to Assessment/Re-Assessment sessions the following treatments were rendered)  Pre-treatment Symptoms/Complaints:  1/31/2018: \"Doing okay. It will be interesting to see how I do today, because I'm due to take my medication now. \"  Pain: Initial: Pain Intensity 1: 0  Post Session:  0     Neuromuscular Re-education (  27 minutes):  Exercise/activities per grid below to improve balance, coordination, kinesthetic sense, posture and proprioception. Required minimal verbal cues to promote static and dynamic balance in standing. Balance/Vestibular Treatment:   Activity   Date   1/31/18    Activity/Exercise       Walking with head turns         Walking with head up & down         Step overs     Over 2 blue foams each leg forward then alternating, then sidestepping over L/R    Step taps     6 inch step 2 x 20 reps forward and cross tap    Marching   2 laps in long hallway    Sidestepping   Over cones x 4 laps    sidestepping 4 laps    [place]       Walking  backwards         Tandem walking       Weaving in/out of cones         Picking up cones         Sports cord         [employer] ball       Figure 8s        Circles right/left       Walking with 360 degree turns       Sit to stand     Weight shifting:    Left & Right         Weight shifting:   Forward & Backward      rockerboard eyes open and closed    Static Standing Balance         Standing with feet apart         Standing with feet together         Standing with feet semitandem       Standing with feet tandem       Single leg stance       X1/X2 Viewing exercises             Therapeutic Exercise: ( ):  Exercises per grid below to improve mobility, strength and balance. Required minimal verbal cues to promote proper body alignment, promote proper body posture and promote proper body mechanics. Progressed resistance, range, repetitions and complexity of movement as indicated. Date:     Activity/Exercise Parameters   Treadmill    Nautilus leg press X    Nautilus leg extension X    Nautilus leg curl X    Calf stretch on slant board X    HEP: walking with arm swing, marching, tandem stance. [employer]  Treatment/Session Assessment:    · Patient tolerated session well. Patient did well with the activities that he completed today. He had to leave early again today as his medication was due and L side was getting more numb and tone was increasing. Patient has adjusted his schedule so that he is past the drowsiness stage of his medication but still in the effective stage. Continue to progress as tolerated. PROGRESS NOTE IS DUE. · Compliance with Program/Exercises: compliant  · Recommendations/Intent for next treatment session: \"Next visit will focus on advancements to more challenging activities\".   Total Treatment Duration:  PT Patient Time In/Time Out  Time In: 4402  Time Out: Jean Walker

## 2018-02-13 ENCOUNTER — HOSPITAL ENCOUNTER (OUTPATIENT)
Dept: PHYSICAL THERAPY | Age: 63
Discharge: HOME | End: 2018-02-13
Attending: HOSPITALIST

## 2018-02-13 NOTE — PROGRESS NOTES
Mr. Tammy Lamar arrived for therapy today but was unable to participate in therapy. Noted he presented with decreased strength and coordination of the left upper extremity compared to recent visits. He requested to cancel his other appointments this week as well. OT called his PCP, Dr. Giovanna Mathur nurse to notify her of the situation.      Wilian Donovan, OT

## 2018-02-15 ENCOUNTER — APPOINTMENT (OUTPATIENT)
Dept: PHYSICAL THERAPY | Age: 63
End: 2018-02-15
Attending: HOSPITALIST

## 2018-02-20 ENCOUNTER — APPOINTMENT (OUTPATIENT)
Dept: PHYSICAL THERAPY | Age: 63
End: 2018-02-20
Attending: HOSPITALIST

## 2018-02-22 ENCOUNTER — APPOINTMENT (OUTPATIENT)
Dept: PHYSICAL THERAPY | Age: 63
End: 2018-02-22
Attending: HOSPITALIST

## 2018-02-27 ENCOUNTER — APPOINTMENT (OUTPATIENT)
Dept: PHYSICAL THERAPY | Age: 63
End: 2018-02-27
Attending: HOSPITALIST

## 2018-03-01 ENCOUNTER — APPOINTMENT (OUTPATIENT)
Dept: PHYSICAL THERAPY | Age: 63
End: 2018-03-01
Attending: HOSPITALIST

## 2018-04-27 NOTE — THERAPY DISCHARGE
Shaun Ibarra  : 1955  [place]  Secondary:  [address]  at [facility]  [address], [address] [address],[address], [address] [facility]  [phone]   [phone]           OUTPATIENT PHYSICAL THERAPY:Discontinuation Summary 2018    ICD-10: Treatment Diagnosis: Other abnormalities of gait and mobility R26.89  Precautions/Allergies:   Review of patient's allergies indicates no known allergies. Fall Risk Score: 1 (? 5 = High Risk)  MD Orders: eval and treat MEDICAL/REFERRING DIAGNOSIS:  . Cerebral infarction, unspecified [I63.9] Right thalamic CVA  DATE OF ONSET: 2017  REFERRING PHYSICIAN: Lenora Lambert MD  RETURN PHYSICIAN APPOINTMENT:      Discontinuation 18:  Mr. Franklyn Diallo attended 9 PT sessions from 17 to 18 for decreased balance and gait, and decreased coordination/proprioception L LE. Patient cancelled an appointment on 18, then never rescheduled. His plan of treatment has , therefore we will discharge patient at this time. Thank you. PROBLEM LIST (Impacting functional limitations):  1. Decreased Strength  2. Decreased ADL/Functional Activities  3. Decreased Transfer Abilities  4. Decreased Ambulation Ability/Technique  5. Decreased Balance  6. Decreased Activity Tolerance  7. Decreased [place] with Home Exercise Program INTERVENTIONS PLANNED:  1. Balance Exercise  2. Gait Training  3. Home Exercise Program (HEP)  4. Neuromuscular Re-education/Strengthening  5. Therapeutic Activites  6. Therapeutic Exercise/Strengthening  7. Transfer Training   8. TREATMENT PLAN:  Effective Dates: 2017 TO 3/2/2018. Frequency/Duration: discharge  GOALS: (Goals have been discussed and agreed upon with patient.)  Short-Term Functional Goals: Time Frame: 2-4 weeks  1. Patient will demonstrate independence and compliance with home exercise program to improve balance and strength for daily activities. MET  2. Patient will increase his score on the Bhagat Balance Scale to greater than or equal to 53/56 indicating improved safety and decreased fall risk for daily activities. MET  Discharge Goals: Time Frame: 8-12 weeks  1. Patient will increase his score on the Bhagat Balance Scale to greater than or equal to 54/56 indicating improved safety and decreased fall risk for daily activities. Unable to reassess  2. Patient will increase his score on the dynamic gait index to greater than or equal to 23/24 indicating improved balance and safety for community ambulation. Unable to reassess  3. Patient will ambulate with a normal gait pattern over level and unlevel surfaces without evidence of imbalance to improve safety for daily activities. Unable to reassess  4. Patient will demonstrate improved score on six minute walk test with stable vital signs indicating improved endurance and mobility for daily activities. Unable to reassess  5. Patient will be independent in discharge HEP to continue to work on balance, strength, and endurance. Unable to reassess  Rehabilitation Potential For Stated Goals: Good            The information in this section was collected on 12/8/17 (except where otherwise noted). HISTORY:   History of Present Injury/Illness (Reason for Referral):  Taking baclofen and valium for L UE pain, improved. Numbness L UE. Moving slower especially with position changes. No dizziness. No falls. No change in vision. Past Medical History/Comorbidities:   Mr. Lana Bowers  has a past medical history of Chronic kidney disease; Hypertension; Stroke [facility]) (11/2017); and Thyroid disease. Mr. Lana Bowers  has no past surgical history on file. Social History/Living Environment:     , was getting ready to retire. Prior Level of Function/Work/Activity:  independent  Dominant Side:         RIGHT  Previous Treatment Approaches:          OT  Personal Factors:          Sex:  male        Age:  70 y.o.   Current Medications: Current Outpatient Prescriptions:     baclofen (LIORESAL) 10 mg tablet, Take 1 Tab by mouth three (3) times daily. , Disp: 15 Tab, Rfl: 0    zolpidem (AMBIEN) 5 mg tablet, Take 1 Tab by mouth nightly as needed for Sleep. Max Daily Amount: 5 mg., Disp: 10 Tab, Rfl: 0    valsartan 320 mg tab 320 mg, hydroCHLOROthiazide 25 mg tab 25 mg, Take 1 Dose by mouth daily. , Disp: , Rfl:     levothyroxine (SYNTHROID) 75 mcg tablet, Take 75 mcg by mouth Daily (before breakfast). , Disp: , Rfl:     escitalopram oxalate (LEXAPRO) 10 mg tablet, Take 10 mg by mouth daily. , Disp: , Rfl:     dextroamphetamine-amphetamine (ADDERALL) 20 mg tablet, Take 20 mg by mouth daily. , Disp: , Rfl:    Date Last Reviewed:  1/31/18    Number of Personal Factors/Comorbidities that affect the Plan of Care: 1-2: MODERATE COMPLEXITY   EXAMINATION:   Observation/Orthostatic Postural Assessment:          WNL  Strength:          4+/5  Functional Mobility:         Gait/Ambulation:  No AD, decreased arm swing L, mild path deviations with head movements or multitasking. Transfers:  independent        Bed Mobility:  Slowly, independently        Stairs:  With rail, step over step, decreased coordination L LE  Sensation:         Decreased sensation L LE  Postural Control & Balance:  · Bhagat Balance Scale:  53/56.   (A score less than 45/56 indicates high risk of falls)   Score at initial evaluation= 51/56  · Dynamic Gait Index:  21/24.   (A score less than or equal to19/24 is abnormal and predictive of falls)   Score at initial evaluation= 18/24   Body Structures Involved:  1. Nerves  2. Eyes and Ears  3. Muscles Body Functions Affected:  1. Sensory/Pain  2. Neuromusculoskeletal  3. Movement Related Activities and Participation Affected:  1. General Tasks and Demands  2. Mobility  3. Domestic Life  4.  Community, Social and [place]   Number of elements (examined above) that affect the Plan of Care: 3: MODERATE COMPLEXITY   CLINICAL PRESENTATION:   Presentation: Evolving clinical presentation with changing clinical characteristics: MODERATE COMPLEXITY   CLINICAL DECISION MAKING:   Outcome Measure: Tool Used: Bhagat Balance Scale  Score:  Initial: 51/56 Most Recent: 53/56 (Date: 1/3/18 )   Interpretation of Score: Each section is scored on a 0-4 scale, 0 representing the patients inability to perform the task and 4 representing independence. The scores of each section are added together for a total score of 56. The higher the patients score, the more independent the patient is. Any score below 45 indicates increased risk for falls. Score 56 55-45 44-34 33-23 22-12 11-1 0   Modifier CH CI CJ CK CL CM CN     Tool Used: Dynamic Gait Index  Score:  Initial: 18/24 Most Recent: 21/24 (Date: 1/3/18 )   Interpretation of Score: Each section is scored on a 0-3 scale, 0 representing the patients inability to perform the task and 3 representing independence. The scores of each section are added together for a total score of 24. Any score below 19 indicates increased risk for falls. Score 24 23-19 18-15 14-10 9-5 4-1 0   Modifier CH CI CJ CK CL CM CN     Tool Used: 6-MINUTE WALK TEST  Score:  Initial: 1350 feet Most Recent: X feet (Date: -- )   Interpretation of Score: Normal range varies but is approximately 4327-2883 Feet      Distance walked: 1350 feet     Baseline End of Test   Heart Rate 86bpm 97bpm   Dyspnea (Pj Scale)     Fatigue (Pj Scale)     SpO2 99% 98%   BP       Score 2133 3043-7369 8367-9564 [phone] 426-16 15-0   Modifier CH CI CJ CK CL CM CN     ?  Mobility - Walking and Moving Around:     - CURRENT STATUS: CJ - 20%-39% impaired, limited or restricted    - GOAL STATUS: CI - 1%-19% impaired, limited or restricted    - D/C STATUS:  unable to reassesss    Medical Necessity:   · Patient is expected to demonstrate progress in strength, balance and functional technique to improve safety during daily activities. Reason for Services/Other Comments:  · Patient continues to demonstrate capacity to improve strength, balance, gait which will increase independence and increase safety.    Use of outcome tool(s) and clinical judgement create a POC that gives a: Questionable prediction of patient's progress: MODERATE COMPLEXITY            TREATMENT:   (In addition to Assessment/Re-Assessment sessions the following treatments were rendered)  Baljit Fraser, PT

## 2018-05-18 NOTE — THERAPY DISCHARGE
Martha All  : 1955  [place]  Secondary:  [address] at [facility]  [address], [address], [facility] [address]  [phone]   [phone]        OUTPATIENT OCCUPATIONAL THERAPY: Discontinuation Summary 18    ICD-10: Treatment Diagnosis: Other lack of coordination (R27.8)  Precautions/Allergies:   Review of patient's allergies indicates no known allergies. Fall Risk Score: 2 (? 5 = High Risk)  MD Orders: OT to evaluate and treat MEDICAL/REFERRING DIAGNOSIS:   Cerebral infarction, unspecified [I63.9] Right thalamic CVA  DATE OF ONSET: approximately 2 weeks ago   REFERRING PHYSICIAN: Kassi Randhawa MD  RETURN PHYSICIAN APPOINTMENT: unknown   Discontinuation Assessment: Mr. Alberto Caban attended 6 out of 10 scheduled therapy visits. He cancelled his last 2 visits and did not return to therapy. Plan of care will be discontinued; please re-consult if future needs arise. Thank you for the opportunity to participate in Mr. Hollie Sanches care. 1/3/18: Mr. Alberto Caban is demonstrating improving range of motion and functional use of the left upper extremity. He continues to struggle with maintaining visual attention to the left upper extremity and impaired proprioception that is affecting his ability to maintain functional grasp on items such as dishes when loading and unloading the . Feel he will continue to benefit from skilled occupational therapy to maximize functional safety and independence with activities of daily living. INITIAL ASSESSMENT:  Mr. Alberto Caban presents s/p right thalamic CVA with decreased strength, coordination and range of motion of the non-dominant left upper extremity. Feel he will benefit from skilled occupational therapy to maximize functional use of the left upper extremity to complete activities of daily living independently and safely. PLAN OF CARE:   PROBLEM LIST:  1. Decreased Strength  2.  Decreased ADL/Functional Activities  3. Increased Pain  4. Decreased Flexibility/Joint Mobility   5. Decreased gross and fine motor coordination of the left upper extremity  INTERVENTIONS PLANNED:  1. Activities of daily living training  2. Manual therapy training  3. Modalities  4. Neuromuscular re-eduation  5. Therapeutic activity  6. Therapeutic exercise   TREATMENT PLAN:  Effective Dates: 11/30/17 TO 2/30/18. Frequency/Duration: 2 times a week for 12 weeks  GOALS: (Goals have been discussed and agreed upon with patient.)  Short-Term Functional Goals: Time Frame: 6 weeks  1. Patient will demonstrate independence with home exercise program for left upper extremity range of motion, strength and coordination. Met  2. Patient will increase left upper extremity strength by at least one half of one manual muscle grade in order to increase use in self-care activities. Unable to assess  3. Patient will increase left  strength by 2-3 pounds in order to hold soap during bathing activities. Unable to assess  Discharge Goals: Time Frame: 12 weeks  1. Patient will increase left shoulder abduction by 10-15 degrees in order to increase functional use in dressing and cooking/home management activities. Met  2. Patient will increase left fine motor coordination as evidenced by completion of 9-hole peg test in no more than 40 seconds in order to increase functional use in dressing tasks including buttoning and tying shoes and ties. Unable to assess  3. Patient will increase left  strength by at least 4-5 pounds in order to use fork and knife to cut food during meals and meal preparation.  Unable to assess    Thank you for this referral,  Malvin Ortez, OT

## 2018-09-13 ENCOUNTER — APPOINTMENT (OUTPATIENT)
Dept: GENERAL RADIOLOGY | Age: 63
DRG: 074 | End: 2018-09-13
Attending: EMERGENCY MEDICINE
Payer: MEDICARE

## 2018-09-13 ENCOUNTER — APPOINTMENT (OUTPATIENT)
Dept: MRI IMAGING | Age: 63
DRG: 074 | End: 2018-09-13
Attending: INTERNAL MEDICINE
Payer: MEDICARE

## 2018-09-13 ENCOUNTER — HOSPITAL ENCOUNTER (INPATIENT)
Age: 63
LOS: 5 days | Discharge: HOME | DRG: 074 | End: 2018-09-18
Attending: EMERGENCY MEDICINE | Admitting: INTERNAL MEDICINE
Payer: MEDICARE

## 2018-09-13 ENCOUNTER — APPOINTMENT (OUTPATIENT)
Dept: CT IMAGING | Age: 63
DRG: 074 | End: 2018-09-13
Attending: EMERGENCY MEDICINE
Payer: MEDICARE

## 2018-09-13 DIAGNOSIS — R29.898 FOCAL MOTOR DEFICIT: ICD-10-CM

## 2018-09-13 DIAGNOSIS — R73.9 HYPERGLYCEMIA: Primary | ICD-10-CM

## 2018-09-13 DIAGNOSIS — E11.44 DIABETIC AMYOTROPHY ASSOCIATED WITH TYPE 2 DIABETES MELLITUS (HCC): ICD-10-CM

## 2018-09-13 DIAGNOSIS — G62.89 OTHER SPECIFIED POLYNEUROPATHIES: ICD-10-CM

## 2018-09-13 PROBLEM — F32.9 MAJOR DEPRESSION: Status: ACTIVE | Noted: 2018-09-13

## 2018-09-13 PROBLEM — R29.90 STROKE-LIKE EPISODE: Status: ACTIVE | Noted: 2018-09-13

## 2018-09-13 LAB
ALBUMIN SERPL-MCNC: 4 G/DL (ref 3.2–4.6)
ALBUMIN/GLOB SERPL: 1.1 {RATIO}
ALP SERPL-CCNC: 188 U/L (ref 50–136)
ALT SERPL-CCNC: 26 U/L (ref 12–65)
ANION GAP SERPL CALC-SCNC: 12 MMOL/L
APTT PPP: 25.2 SEC (ref 23.2–35.3)
ARTERIAL PATENCY WRIST A: ABNORMAL
AST SERPL-CCNC: 17 U/L (ref 15–37)
ATRIAL RATE: 88 BPM
BASE DEFICIT BLDV-SCNC: 4 MMOL/L
BASOPHILS # BLD: 0 K/UL (ref 0–0.2)
BASOPHILS NFR BLD: 1 % (ref 0–2)
BDY SITE: ABNORMAL
BILIRUB SERPL-MCNC: 0.3 MG/DL (ref 0.2–1.1)
BUN SERPL-MCNC: 21 MG/DL (ref 8–23)
CALCIUM SERPL-MCNC: 9.3 MG/DL (ref 8.3–10.4)
CALCULATED P AXIS, ECG09: 59 DEGREES
CALCULATED R AXIS, ECG10: 16 DEGREES
CALCULATED T AXIS, ECG11: 48 DEGREES
CHLORIDE SERPL-SCNC: 91 MMOL/L (ref 98–107)
CO2 SERPL-SCNC: 23 MMOL/L (ref 21–32)
COHGB MFR BLD: 0.3 % (ref 0.5–1.5)
CREAT SERPL-MCNC: 1.76 MG/DL (ref 0.8–1.5)
D DIMER PPP FEU-MCNC: 0.6 UG/ML(FEU)
DIAGNOSIS, 93000: NORMAL
DIFFERENTIAL METHOD BLD: ABNORMAL
DO-HGB BLD-MCNC: 8 % (ref 0–5)
EOSINOPHIL # BLD: 0.2 K/UL (ref 0–0.8)
EOSINOPHIL NFR BLD: 3 % (ref 0.5–7.8)
ERYTHROCYTE [DISTWIDTH] IN BLOOD BY AUTOMATED COUNT: 12.8 %
GLOBULIN SER CALC-MCNC: 3.5 G/DL (ref 2.3–3.5)
GLUCOSE BLD STRIP.AUTO-MCNC: 259 MG/DL (ref 65–100)
GLUCOSE BLD STRIP.AUTO-MCNC: 588 MG/DL (ref 65–100)
GLUCOSE BLD STRIP.AUTO-MCNC: >600 MG/DL (ref 65–100)
GLUCOSE BLD STRIP.AUTO-MCNC: >600 MG/DL (ref 65–100)
GLUCOSE SERPL-MCNC: 781 MG/DL (ref 65–100)
HCO3 BLDV-SCNC: 21 MMOL/L
HCT VFR BLD AUTO: 41.4 % (ref 41.1–50.3)
HGB BLD-MCNC: 14.6 G/DL (ref 13.6–17.2)
HGB BLDMV-MCNC: 15.1 GM/DL (ref 11.7–15)
IMM GRANULOCYTES # BLD: 0 K/UL (ref 0–0.5)
IMM GRANULOCYTES NFR BLD AUTO: 0 % (ref 0–5)
INR BLD: 1 (ref 0.9–1.2)
LYMPHOCYTES # BLD: 2 K/UL (ref 0.5–4.6)
LYMPHOCYTES NFR BLD: 32 % (ref 13–44)
MCH RBC QN AUTO: 30.5 PG (ref 26.1–32.9)
MCHC RBC AUTO-ENTMCNC: 35.3 G/DL (ref 31.4–35)
MCV RBC AUTO: 86.4 FL (ref 79.6–97.8)
METHGB MFR BLD: 0.3 % (ref 0–1.5)
MONOCYTES # BLD: 0.5 K/UL (ref 0.1–1.3)
MONOCYTES NFR BLD: 9 % (ref 4–12)
NEUTS SEG # BLD: 3.4 K/UL (ref 1.7–8.2)
NEUTS SEG NFR BLD: 55 % (ref 43–78)
NRBC # BLD: 0 K/UL (ref 0–0.2)
OXYHGB MFR BLDV: 91.3 %
P-R INTERVAL, ECG05: 192 MS
PCO2 BLDV: 37.5 MMHG
PH BLDV: 7.36 [PH]
PLATELET # BLD AUTO: 239 K/UL (ref 150–450)
PMV BLD AUTO: 12.3 FL (ref 9.4–12.3)
PO2 BLDV: 64 MMHG
POTASSIUM SERPL-SCNC: 4.3 MMOL/L (ref 3.5–5.1)
PROT SERPL-MCNC: 7.5 G/DL
PT BLD: 11.9 SECS (ref 9.6–11.6)
Q-T INTERVAL, ECG07: 362 MS
QRS DURATION, ECG06: 94 MS
QTC CALCULATION (BEZET), ECG08: 438 MS
RBC # BLD AUTO: 4.79 M/UL (ref 4.23–5.6)
SAO2 % BLDV: 92 %
SODIUM SERPL-SCNC: 126 MMOL/L (ref 136–145)
TROPONIN I SERPL-MCNC: <0.02 NG/ML (ref 0.02–0.05)
VENTILATION MODE VENT: ABNORMAL
VENTRICULAR RATE, ECG03: 88 BPM
WBC # BLD AUTO: 6.1 K/UL (ref 4.3–11.1)

## 2018-09-13 PROCEDURE — 81003 URINALYSIS AUTO W/O SCOPE: CPT | Performed by: EMERGENCY MEDICINE

## 2018-09-13 PROCEDURE — 99285 EMERGENCY DEPT VISIT HI MDM: CPT | Performed by: EMERGENCY MEDICINE

## 2018-09-13 PROCEDURE — 65270000029 HC RM PRIVATE

## 2018-09-13 PROCEDURE — 96361 HYDRATE IV INFUSION ADD-ON: CPT | Performed by: EMERGENCY MEDICINE

## 2018-09-13 PROCEDURE — 82803 BLOOD GASES ANY COMBINATION: CPT

## 2018-09-13 PROCEDURE — 74011636637 HC RX REV CODE- 636/637: Performed by: EMERGENCY MEDICINE

## 2018-09-13 PROCEDURE — 85025 COMPLETE CBC W/AUTO DIFF WBC: CPT

## 2018-09-13 PROCEDURE — 93005 ELECTROCARDIOGRAM TRACING: CPT | Performed by: INTERNAL MEDICINE

## 2018-09-13 PROCEDURE — 71045 X-RAY EXAM CHEST 1 VIEW: CPT

## 2018-09-13 PROCEDURE — 74011636637 HC RX REV CODE- 636/637: Performed by: INTERNAL MEDICINE

## 2018-09-13 PROCEDURE — 96372 THER/PROPH/DIAG INJ SC/IM: CPT | Performed by: EMERGENCY MEDICINE

## 2018-09-13 PROCEDURE — 74011636637 HC RX REV CODE- 636/637: Performed by: FAMILY MEDICINE

## 2018-09-13 PROCEDURE — 86580 TB INTRADERMAL TEST: CPT | Performed by: INTERNAL MEDICINE

## 2018-09-13 PROCEDURE — 70450 CT HEAD/BRAIN W/O DYE: CPT

## 2018-09-13 PROCEDURE — 84484 ASSAY OF TROPONIN QUANT: CPT

## 2018-09-13 PROCEDURE — 74011250636 HC RX REV CODE- 250/636: Performed by: INTERNAL MEDICINE

## 2018-09-13 PROCEDURE — 93005 ELECTROCARDIOGRAM TRACING: CPT | Performed by: EMERGENCY MEDICINE

## 2018-09-13 PROCEDURE — 85379 FIBRIN DEGRADATION QUANT: CPT

## 2018-09-13 PROCEDURE — 74011000302 HC RX REV CODE- 302: Performed by: INTERNAL MEDICINE

## 2018-09-13 PROCEDURE — 82962 GLUCOSE BLOOD TEST: CPT

## 2018-09-13 PROCEDURE — 85610 PROTHROMBIN TIME: CPT

## 2018-09-13 PROCEDURE — 80053 COMPREHEN METABOLIC PANEL: CPT

## 2018-09-13 PROCEDURE — 74011250636 HC RX REV CODE- 250/636: Performed by: EMERGENCY MEDICINE

## 2018-09-13 PROCEDURE — 85730 THROMBOPLASTIN TIME PARTIAL: CPT

## 2018-09-13 PROCEDURE — 96360 HYDRATION IV INFUSION INIT: CPT | Performed by: EMERGENCY MEDICINE

## 2018-09-13 RX ORDER — HEPARIN SODIUM 5000 [USP'U]/ML
5000 INJECTION, SOLUTION INTRAVENOUS; SUBCUTANEOUS EVERY 8 HOURS
Status: DISCONTINUED | OUTPATIENT
Start: 2018-09-13 | End: 2018-09-18 | Stop reason: HOSPADM

## 2018-09-13 RX ORDER — ACETAMINOPHEN 325 MG/1
650 TABLET ORAL
Status: DISCONTINUED | OUTPATIENT
Start: 2018-09-13 | End: 2018-09-14

## 2018-09-13 RX ORDER — SODIUM CHLORIDE 9 MG/ML
125 INJECTION, SOLUTION INTRAVENOUS CONTINUOUS
Status: DISCONTINUED | OUTPATIENT
Start: 2018-09-13 | End: 2018-09-14

## 2018-09-13 RX ORDER — ASPIRIN 81 MG/1
81 TABLET ORAL DAILY
COMMUNITY

## 2018-09-13 RX ORDER — ATORVASTATIN CALCIUM 40 MG/1
40 TABLET, FILM COATED ORAL
COMMUNITY

## 2018-09-13 RX ORDER — DIAZEPAM 5 MG/1
5 TABLET ORAL
COMMUNITY

## 2018-09-13 RX ORDER — INSULIN GLARGINE 100 [IU]/ML
25 INJECTION, SOLUTION SUBCUTANEOUS
Status: DISCONTINUED | OUTPATIENT
Start: 2018-09-13 | End: 2018-09-18 | Stop reason: HOSPADM

## 2018-09-13 RX ORDER — INSULIN LISPRO 100 [IU]/ML
INJECTION, SOLUTION INTRAVENOUS; SUBCUTANEOUS
Status: DISCONTINUED | OUTPATIENT
Start: 2018-09-13 | End: 2018-09-18 | Stop reason: HOSPADM

## 2018-09-13 RX ORDER — INSULIN LISPRO 100 [IU]/ML
10 INJECTION, SOLUTION INTRAVENOUS; SUBCUTANEOUS ONCE
Status: COMPLETED | OUTPATIENT
Start: 2018-09-13 | End: 2018-09-13

## 2018-09-13 RX ORDER — ONDANSETRON 2 MG/ML
4 INJECTION INTRAMUSCULAR; INTRAVENOUS
Status: DISCONTINUED | OUTPATIENT
Start: 2018-09-13 | End: 2018-09-18 | Stop reason: HOSPADM

## 2018-09-13 RX ADMIN — SODIUM CHLORIDE 1000 ML: 900 INJECTION, SOLUTION INTRAVENOUS at 16:32

## 2018-09-13 RX ADMIN — INSULIN LISPRO 10 UNITS: 100 INJECTION, SOLUTION INTRAVENOUS; SUBCUTANEOUS at 19:34

## 2018-09-13 RX ADMIN — SODIUM CHLORIDE 125 ML/HR: 900 INJECTION, SOLUTION INTRAVENOUS at 22:40

## 2018-09-13 RX ADMIN — SODIUM CHLORIDE 125 ML/HR: 900 INJECTION, SOLUTION INTRAVENOUS at 18:03

## 2018-09-13 RX ADMIN — SODIUM CHLORIDE 1000 ML: 900 INJECTION, SOLUTION INTRAVENOUS at 16:31

## 2018-09-13 RX ADMIN — INSULIN LISPRO 6 UNITS: 100 INJECTION, SOLUTION INTRAVENOUS; SUBCUTANEOUS at 23:01

## 2018-09-13 RX ADMIN — TUBERCULIN PURIFIED PROTEIN DERIVATIVE 5 UNITS: 5 INJECTION, SOLUTION INTRADERMAL at 23:50

## 2018-09-13 RX ADMIN — HEPARIN SODIUM 5000 UNITS: 5000 INJECTION INTRAVENOUS; SUBCUTANEOUS at 23:01

## 2018-09-13 RX ADMIN — INSULIN GLARGINE 25 UNITS: 100 INJECTION, SOLUTION SUBCUTANEOUS at 23:50

## 2018-09-13 RX ADMIN — INSULIN HUMAN 10 UNITS: 100 INJECTION, SOLUTION PARENTERAL at 17:56

## 2018-09-13 NOTE — ED NOTES
Pt's symptoms are not different than residual symptoms from CVA earlier this year. Pt c/o pain to left arm but has frozen shoulder that he is not compliant with his physical therapy.

## 2018-09-13 NOTE — ED PROVIDER NOTES
Patient is a 70 y.o. male presenting with facial droop. The history is provided by the patient. Facial Droop This is a recurrent problem. The current episode started more than 2 days ago. The problem has not changed since onset. There was left facial and left upper extremity focality noted. Primary symptoms include focal weakness and loss of sensation. Pertinent negatives include no loss of balance, no slurred speech, no speech difficulty, no memory loss, no movement disorder, no agitation, no visual change, no auditory change, no mental status change, no unresponsiveness and no disorientation. There has been no fever. Associated symptoms include shortness of breath and chest pain. Pertinent negatives include no vomiting, no altered mental status, no confusion, no headaches, no choking, no nausea, no bowel incontinence and no bladder incontinence. Past Medical History:  
Diagnosis Date  Chronic kidney disease   
 kidney stone  Hypertension  Stroke [facility]) 11/2017 Left sided weakness/numbness, artherosclerotic thickening & calcified plaques  Thyroid disease History reviewed. No pertinent surgical history. History reviewed. No pertinent family history. Social History Social History  Marital status:  Spouse name: N/A  
 Number of children: N/A  
 Years of education: N/A Occupational History  Not on file. Social History Main Topics  Smoking status: Never Smoker  Smokeless tobacco: Current User Types: Chew  Alcohol use Yes Comment: nightly, 1-2 bottles  Drug use: No  
 Sexual activity: Not on file Other Topics Concern  Not on file Social History Narrative ALLERGIES: Review of patient's allergies indicates no known allergies. Review of Systems Respiratory: Positive for shortness of breath. Negative for choking. Cardiovascular: Positive for chest pain. Gastrointestinal: Negative for bowel incontinence, nausea and vomiting. Genitourinary: Negative for bladder incontinence. Neurological: Positive for focal weakness. Negative for speech difficulty, headaches and loss of balance. Psychiatric/Behavioral: Negative for agitation, confusion and memory loss. All other systems reviewed and are negative. Vitals:  
 09/13/18 1550 BP: 139/70 Pulse: 85 Resp: 22 Temp: 98.2 °F (36.8 °C) Weight: 102.1 kg (225 lb) Height: 5' 9\" (1.753 m) Physical Exam  
Constitutional: He is oriented to person, place, and time. He appears well-developed and well-nourished. HENT:  
Head: Normocephalic and atraumatic. Right Ear: External ear normal.  
Left Ear: External ear normal.  
Eyes: Conjunctivae and EOM are normal. Pupils are equal, round, and reactive to light. Neck: Normal range of motion. Neck supple. Cardiovascular: Normal rate and regular rhythm. Pulmonary/Chest: Effort normal and breath sounds normal.  
Abdominal: Soft. Bowel sounds are normal.  
Musculoskeletal: He exhibits no edema, tenderness or deformity. Decreased strength in the left upper and lower extremity but patient is a breast and unresolved power, he is able to stand on both feet without major assistance Neurological: He is alert and oriented to person, place, and time. Subtle flattening of the nasolabial fold on the left side with minor droop otherwise no weakness of the brow or any other portion of the face. Patient reports decreased sensation left side. Nursing note and vitals reviewed. MDM Number of Diagnoses or Management Options Focal motor deficit: Hyperglycemia:  
Diagnosis management comments: 15-year-old male presenting for decreased sensation on the left side of the face and left arm. He's had a stroke in the past and this is the distribution which he had symptoms before.   Differential includes recurrent stroke, activation of the old stroke, metabolic abnormality, occult infection, medication reaction. EKG was performed and interpreted as a normal sinus rhythm rate 88, normal axis, low voltage EKG, CO was 192, QRS was 94, QTc was 438 acute ischemic changes. Amount and/or Complexity of Data Reviewed Clinical lab tests: ordered and reviewed Tests in the radiology section of CPT®: ordered and reviewed Tests in the medicine section of CPT®: ordered and reviewed Decide to obtain previous medical records or to obtain history from someone other than the patient: yes Risk of Complications, Morbidity, and/or Mortality Presenting problems: high Diagnostic procedures: high Management options: moderate Patient Progress Patient progress: improved ED Course Comment By Time Upon initial evaluation by nursing a fingerstick glucose was performed and it was found to be greater than 600. The patient reports no history of diabetes in the past.  It is likely that this is the cause of all of the patient's issues resulting in reactivation of his old stroke in worsening of symptoms related to the prior infarct. Curtis Fuller MD 09/13 6047 I reviewed the patient's home medication list and he has nothing for diabetes. Curtis Fuller MD 09/13 9911 And given the patient 2 L of fluid for rehydration and 10 units of subcutaneous insulin. He is not acidotic so I do not feel that he needs insulin drip. Curtis Fuller MD 09/13 6139 I consult the hospitalist 5 minutes ago, discussed the case, treatment 70 given, and transferred care to them in stable condition for further workup regarding the patient's increased focal weakness and new onset hyperglycemia. Curtis Fuller MD 09/13 5117 Procedures

## 2018-09-13 NOTE — H&P
Hospitalist H&P Note Admit Date:  2018  3:53 PM  
Name:  Alexander Hendrix Age:  70 y.o. 
:  1955 MRN:  [id number] PCP:  Nuvia Gold MD 
Treatment Team: Attending Provider: Joel Ott MD; Primary Nurse: Alena Oscar RN 
 
HPI:  
Mr. Bisi Garner is a 71 y/o WM with a h/o CVA in 2017 with residual LUE weakness, HTN, MDD and hypothyroidism presenting today with c/o worsening left arm weakness for several days. His daughter is at the bedside and says patient was complaining to her uncle that the symptoms have been present for over 1 week. Apparently the patient had previously said he didn't see the point in coming in sooner because he didn't think anything was done for him during his last stroke. He called his PCP today who instructed him to come to the ED. He had started lifting weights and thought he had injured his arm that way, however his weakness and numbness persisted. He also c/o some left sided weakness. No facial droop or slurred speech was noted. Initial head CT here was unremarkable. His labs show hyponatremia, hyperglycemia and SHANTELL. A1C 2017 was 6.3. MRI done at that time showed small vessel ischemic disease but no acute infarct; head CT 17 showed an old thalamic infarct. He says he was compliant with PT after that. ROS otherwise negative. 10 systems reviewed and negative except as noted in HPI. Past Medical History:  
Diagnosis Date  Chronic kidney disease   
 kidney stone  Hypertension  Major depression  Stroke [facility] 2017 Left sided weakness/numbness, artherosclerotic thickening & calcified plaques  Thyroid disease History reviewed. No pertinent surgical history. No Known Allergies Social History Substance Use Topics  Smoking status: Never Smoker  Smokeless tobacco: Current User Types: Chew  Alcohol use No  
   Comment: nightly, 1-2 bottles Family History Problem Relation Age of Onset  Heart Disease Mother  Dementia Father Immunization History Administered Date(s) Administered  Influenza Vaccine (Quad) PF 11/19/2017 PTA Medications: 
Prior to Admission Medications Prescriptions Last Dose Informant Patient Reported? Taking?  
aspirin delayed-release 81 mg tablet   Yes Yes Sig: Take 81 mg by mouth daily. atorvastatin (LIPITOR) 40 mg tablet   Yes Yes Sig: Take 40 mg by mouth daily. baclofen (LIORESAL) 10 mg tablet 9/13/2018 at Unknown time  No Yes Sig: Take 1 Tab by mouth three (3) times daily. diazePAM (VALIUM) 5 mg tablet   Yes Yes Sig: Take 5 mg by mouth every six (6) hours as needed for Anxiety. escitalopram oxalate (LEXAPRO) 10 mg tablet 9/13/2018 at Unknown time  Yes Yes Sig: Take 10 mg by mouth daily. levothyroxine (SYNTHROID) 75 mcg tablet 9/13/2018 at Unknown time  Yes Yes Sig: Take 75 mcg by mouth Daily (before breakfast). valsartan 320 mg tab 320 mg, hydroCHLOROthiazide 25 mg tab 25 mg 9/13/2018 at Unknown time  Yes Yes Sig: Take 1 Dose by mouth daily. Facility-Administered Medications: None Objective:  
Patient Vitals for the past 24 hrs: 
 Temp Pulse Resp BP SpO2  
09/13/18 1703 - 74 19 99/63 94 % 09/13/18 1550 98.2 °F (36.8 °C) 85 22 139/70 - Oxygen Therapy O2 Sat (%): 94 % (09/13/18 1703) Pulse via Oximetry: 77 beats per minute (09/13/18 1703) Intake/Output Summary (Last 24 hours) at 09/13/18 [facility] Last data filed at 09/13/18 6607 Gross per 24 hour Intake             2000 ml Output                0 ml Net             2000 ml Physical Exam: 
General:    Well nourished. Alert. Eyes:   Normal sclera. Extraocular movements intact. ENT:  Normocephalic, atraumatic. Moist mucous membranes CV:   RRR. No m/r/g. Peripheral pulses 2+. Capillary refill <2s. Lungs:  CTAB. No wheezing, rhonchi, or rales. Abdomen: Soft, nontender, nondistended. Extremities: Warm and dry. No cyanosis or edema. Neurologic: CN II-XII grossly intact. Self resolving horizontal right beating nystagmus.  strength reduced in LUE; plantar flexion reduced in LLE. No facial asymmetry; tongue is midline on protrusion. Facial sensation is mildly reduced on the left side. Skin:     No rashes or jaundice. Normal coloration Psych:  Flat affect. I reviewed the labs, imaging, EKGs, telemetry, and other studies done this admission. Data Review:  
Recent Results (from the past 24 hour(s)) POC PT/INR Collection Time: 09/13/18  3:54 PM  
Result Value Ref Range Prothrombin time (POC) 11.9 (H) 9.6 - 11.6 SECS  
 INR (POC) 1.0 0.9 - 1.2 GLUCOSE, POC Collection Time: 09/13/18  3:54 PM  
Result Value Ref Range Glucose (POC) >600 (HH) 65 - 100 mg/dL CBC WITH AUTOMATED DIFF Collection Time: 09/13/18  4:00 PM  
Result Value Ref Range WBC 6.1 4.3 - 11.1 K/uL  
 RBC 4.79 4.23 - 5.6 M/uL  
 HGB 14.6 13.6 - 17.2 g/dL HCT 41.4 41.1 - 50.3 % MCV 86.4 79.6 - 97.8 FL  
 MCH 30.5 26.1 - 32.9 PG  
 MCHC 35.3 (H) 31.4 - 35.0 g/dL  
 RDW 12.8 % PLATELET 964 431 - 114 K/uL MPV 12.3 9.4 - 12.3 FL ABSOLUTE NRBC 0.00 0.0 - 0.2 K/uL  
 DF AUTOMATED NEUTROPHILS 55 43 - 78 % LYMPHOCYTES 32 13 - 44 % MONOCYTES 9 4.0 - 12.0 % EOSINOPHILS 3 0.5 - 7.8 % BASOPHILS 1 0.0 - 2.0 % IMMATURE GRANULOCYTES 0 0.0 - 5.0 %  
 ABS. NEUTROPHILS 3.4 1.7 - 8.2 K/UL  
 ABS. LYMPHOCYTES 2.0 0.5 - 4.6 K/UL  
 ABS. MONOCYTES 0.5 0.1 - 1.3 K/UL  
 ABS. EOSINOPHILS 0.2 0.0 - 0.8 K/UL  
 ABS. BASOPHILS 0.0 0.0 - 0.2 K/UL  
 ABS. IMM. GRANS. 0.0 0.0 - 0.5 K/UL METABOLIC PANEL, COMPREHENSIVE Collection Time: 09/13/18  4:00 PM  
Result Value Ref Range Sodium 126 (L) 136 - 145 mmol/L Potassium 4.3 3.5 - 5.1 mmol/L Chloride 91 (L) 98 - 107 mmol/L  
 CO2 23 21 - 32 mmol/L Anion gap 12 mmol/L Glucose 781 (HH) 65 - 100 mg/dL BUN 21 8 - 23 MG/DL  Creatinine 1.76 (H) 0.8 - 1.5 MG/DL  
 GFR est AA 50 (L) >60 ml/min/1.73m2 GFR est non-AA 42 ml/min/1.73m2 Calcium 9.3 8.3 - 10.4 MG/DL Bilirubin, total 0.3 0.2 - 1.1 MG/DL  
 ALT (SGPT) 26 12 - 65 U/L  
 AST (SGOT) 17 15 - 37 U/L Alk. phosphatase 188 (H) 50 - 136 U/L Protein, total 7.5 g/dL Albumin 4.0 3.2 - 4.6 g/dL Globulin 3.5 2.3 - 3.5 g/dL A-G Ratio 1.1 PTT Collection Time: 09/13/18  4:00 PM  
Result Value Ref Range aPTT 25.2 23.2 - 35.3 SEC  
D DIMER Collection Time: 09/13/18  4:00 PM  
Result Value Ref Range D DIMER 0.60 (HH) <0.56 ug/ml(FEU) TROPONIN I Collection Time: 09/13/18  4:00 PM  
Result Value Ref Range Troponin-I, Qt. <0.02 (L) 0.02 - 0.05 NG/ML  
BLOOD GAS, VENOUS Collection Time: 09/13/18  4:22 PM  
Result Value Ref Range VENOUS PH 7.36    
 VENOUS PCO2 37.5 mmHg VENOUS PO2 64 mmHg VENOUS BICARBONATE 21 mmol/L  
 VENOUS BASE DEFICIT 4.0 mmol/L  
 TOTAL HEMOGLOBIN 15.1 (H) 11.7 - 15.0 GM/DL VENOUS O2 SATURATION 92 % VENOUS O2 HGB 91.3 % CARBOXYHEMOGLOBIN 0.3 (L) 0.5 - 1.5 % METHEMOGLOBIN 0.3 0.0 - 1.5 % DEOXYHEMOGLOBIN 8 (H) 0.0 - 5.0 % SITE RR   
 ALLENS TEST NA   
 MODE RA   
GLUCOSE, POC Collection Time: 09/13/18  5:51 PM  
Result Value Ref Range Glucose (POC) >600 (HH) 65 - 100 mg/dL All Micro Results None Current Facility-Administered Medications Medication Dose Route Frequency  0.9% sodium chloride infusion  125 mL/hr IntraVENous CONTINUOUS  
 insulin glargine (LANTUS) injection 25 Units  25 Units SubCUTAneous QHS  insulin lispro (HUMALOG) injection   SubCUTAneous AC&HS Current Outpatient Prescriptions Medication Sig  
 atorvastatin (LIPITOR) 40 mg tablet Take 40 mg by mouth daily.  aspirin delayed-release 81 mg tablet Take 81 mg by mouth daily.  diazePAM (VALIUM) 5 mg tablet Take 5 mg by mouth every six (6) hours as needed for Anxiety.  baclofen (LIORESAL) 10 mg tablet Take 1 Tab by mouth three (3) times daily.  valsartan 320 mg tab 320 mg, hydroCHLOROthiazide 25 mg tab 25 mg Take 1 Dose by mouth daily.  levothyroxine (SYNTHROID) 75 mcg tablet Take 75 mcg by mouth Daily (before breakfast).  escitalopram oxalate (LEXAPRO) 10 mg tablet Take 10 mg by mouth daily. Other Studies: 
Ct Head Wo Cont Result Date: 9/13/2018 CT HEAD WITHOUT CONTRAST, 9/13/2018 History: Left arm pain. Comparison: CT head without contrast 11/27/2017 Technique:   5 mm axial scans from the skull base to the vertex. All CT scans performed at this facility use one or all of the following: Automated exposure control, adjustment of the mA and/or kVp according to patient's size, iterative reconstruction. Findings:  No evidence of intracranial hemorrhage is seen. No abnormal extra-axial fluid collections are seen. Moderate cortical and positional changes are seen which appear advanced for the patient's age. No evidence for acute hydrocephalus is seen. No evidence of midline shift or herniation is seen. No abnormal edema pattern is seen in a vascular distribution to suggest large artery infarction. Evaluation with bone windows shows no acute osseous changes. The visualized sinuses, middle ears, and mastoid air cells are well aerated. IMPRESSION:  1. No acute intracranial process evident by noncontrast CT study of the head. Xr Chest Amber Lauth Result Date: 9/13/2018 Portable chest x-ray CLINICAL INDICATION: Shortness of breath FINDINGS: Single AP view the chest compared to a similar exam dated 3/19/2015 show the lungs to be expanded and clear. No pleural effusion or pneumothorax. IMPRESSION: Unremarkable portable chest x-ray. Assessment and Plan:  
 
Hospital Problems as of 9/13/2018  Never Reviewed Codes Class Noted - Resolved POA Major depression ICD-10-CM: F32.9 ICD-9-CM: 296.20  9/13/2018 - Present Yes Hyperglycemia ICD-10-CM: R73.9 ICD-9-CM: 790.29  9/13/2018 - Present Unknown Stroke-like episode ([facility] [address] ICD-10-CM: I63.9 ICD-9-CM: 434.91  9/13/2018 - Present Unknown Thalamic infarct, acute ([facility] [address] ICD-10-CM: I63.9 ICD-9-CM: 434.91  11/21/2017 - Present Hyperlipidemia ICD-10-CM: E78.5 ICD-9-CM: 272.4  11/19/2017 - Present Yes  
   
 TIA (transient ischemic attack) ICD-10-CM: G45.9 ICD-9-CM: 435.9  11/18/2017 - Present Yes Hypertension ICD-10-CM: I10 
ICD-9-CM: 401.9  11/18/2017 - Present Yes Acquired hypothyroidism (Chronic) ICD-10-CM: E03.9 ICD-9-CM: 244.9  11/18/2017 - Present Yes Plan: # Progressive left sided weakness/numbness 
 - Head CT neg; stroke w/u -- FLP, A1C, MRI, echo, EKG ordered - ASA, statin, remote tele, PT/OT/ST, CM for dispo # Hyperglycemia - Likely new onset DM; will check UA and A1C 
 - Check UA and A1C 
 - Start basal/bolus insulin; not in DKA # SHANTELL 
 - IVFs, repeat in AM 
 - Hold ARB # HTN 
 - Hold ARB as above # Hyponatremia - Repeat in AM 
 
# Left shoulder pain 
 - Xray # PPx 
 - GI: not indicated 
 - DVT: heparin # FEN/FIT 
 - IVFs; replete lytes prn; NPO until dysphagia scree - No grewal; PIV; remote tele # Dispo - Admit for stroke work up. Code status:  Full Estimated LOS:  Greater than 2 midnights Risk:  high Signed: 
Nehemiah Dawson MD

## 2018-09-13 NOTE — IP AVS SNAPSHOT
[address] [id number] [facility] 
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 [address] [address] [phone] Discharge Orders None A check lynette indicates which time of day the medication should be taken. My Medications START taking these medications Instructions Each Dose to Equal  
 Morning Noon Evening Bedtime  
 gabapentin 300 mg capsule Commonly known as:  NEURONTIN Start taking on:  9/19/2018 Your next dose is:  Tomorrow Take 1 Cap by mouth daily. Indications: NEUROPATHIC PAIN  
 300 mg  
    
  
   
   
   
  
 insulin glargine 100 unit/mL injection Commonly known as:  LANTUS Your next dose is: Tonight 30 Units by SubCUTAneous route nightly. 30 Units  
    
   
   
  
   
  
 insulin lispro 100 unit/mL injection Commonly known as:  HUMALOG Your next dose is: With dinner Continue sliding scale CHANGE how you take these medications Instructions Each Dose to Equal  
 Morning Noon Evening Bedtime baclofen 10 mg tablet Commonly known as:  LIORESAL What changed:   
- how much to take - when to take this Your next dose is: This evening Take 1 Tab by mouth three (3) times daily. 10 mg CONTINUE taking these medications Instructions Each Dose to Equal  
 Morning Noon Evening Bedtime  
 aspirin delayed-release 81 mg tablet Your next dose is:  tomorrow Take 81 mg by mouth daily. 81 mg  
    
  
   
   
   
  
 atorvastatin 40 mg tablet Commonly known as:  LIPITOR Your next dose is: Tonight Take 40 mg by mouth nightly. 40 mg  
    
   
   
  
   
  
 levothyroxine 75 mcg tablet Commonly known as:  SYNTHROID Your next dose is:  Tomorrow Take 75 mcg by mouth Daily (before breakfast). 75 mcg LEXAPRO 10 mg tablet Generic drug:  escitalopram oxalate Your next dose is:  Tomorrow Take 20 mg by mouth daily. 20 mg  
    
  
   
   
   
  
 multivitamin, tx-iron-ca-min 9 mg iron-400 mcg Tab tablet Commonly known as:  THERA-M w/ IRON Your next dose is:  Resume home schedule Take 1 Tab by mouth daily. 1 Tab  
    
   
   
   
  
 VALIUM 5 mg tablet Generic drug:  diazePAM  
Your next dose is:  As needed Take 5 mg by mouth two (2) times daily as needed for Anxiety. 5 mg  
    
   
   
   
  
 valsartan 320 mg tab 320 mg, hydroCHLOROthiazide 25 mg tab 25 mg Your next dose is:  Resume home schedule Take 1 Dose by mouth daily. 1 Dose Where to Get Your Medications Information on where to get these meds will be given to you by the nurse or doctor. ! Ask your nurse or doctor about these medications  
  gabapentin 300 mg capsule  
 insulin glargine 100 unit/mL injection  
 insulin lispro 100 unit/mL injection Discharge Instructions Follow up instructions from MD: 
 
 F/u with pcp in a week with bmp. Advised to drink atleast 2 liters of fluids/day. F/u with Neurology was per their recommendations. Diabetic and 2 gm low sodium diet. Advised to keep a log of all finger stick glucose at home and show it to pcp at follow up. Out pt referral for therapy has been ordered by . Take blood pressure medication if systolic greater then 284 mmhg systolic. F/u with orthopedic physician as out pt for left shoulder arthritis. 
  
 
 
DISCHARGE SUMMARY from Nurse PATIENT INSTRUCTIONS: 
 
 
F-face looks uneven A-arms unable to move or move unevenly S-speech slurred or non-existent T-time-call 911 as soon as signs and symptoms begin-DO NOT go Back to bed or wait to see if you get better-TIME IS BRAIN. Warning Signs of HEART ATTACK Call 911 if you have these symptoms: 
? Chest discomfort. Most heart attacks involve discomfort in the center of the chest that lasts more than a few minutes, or that goes away and comes back. It can feel like uncomfortable pressure, squeezing, fullness, or pain. ? Discomfort in other areas of the upper body. Symptoms can include pain or discomfort in one or both arms, the back, neck, jaw, or stomach. ? Shortness of breath with or without chest discomfort. ? Other signs may include breaking out in a cold sweat, nausea, or lightheadedness. Don't wait more than five minutes to call [address]! Fast action can save your life. Calling 911 is almost always the fastest way to get lifesaving treatment. Emergency Medical Services staff can begin treatment when they arrive  up to an hour sooner than if someone gets to the hospital by car. The discharge information has been reviewed with the patient. The patient verbalized understanding. Discharge medications reviewed with the patient and appropriate educational materials and side effects teaching were provided. ___________________________________________________________________________________________________________________________________ Stroke: After Your Visit Your Care Instructions You have had a stroke. Risk factors for stroke include being overweight, smoking, and sedentary lifestyle. This means that the blood flow to a part of your brain was blocked for some time, which damages the nerve cells in that part of the brain. The part of your body controlled by that part of your brain may not function properly now. The brain is an amazing organ that can heal itself to some degree. The stroke you had damaged part of your brain, but other parts of your brain may take over in some way for the damaged areas. You have already started this process. Going home may be hard for you and your family. The more you can try to do for yourself, the better. Remember to take each day one at a time. Follow-up care is a key part of your treatment and safety. Be sure to make and go to all appointments, and call your doctor if you are having problems. Its also a good idea to know your test results and keep a list of the medicines you take. How can you care for yourself at home? Enter a stroke rehabilitation (rehab) program, if your doctor recommends it. Physical, speech, and occupational therapies can help you manage bathing, dressing, eating, and other basics of daily living. Eat a heart-healthy diet that is low in cholesterol, saturated fat, and salt. Eat lots of fresh fruits and vegetables and foods high in fiber. Increase your activities slowly. Take short rest breaks when you get tired. Gradually increase the amount you walk.  Start out by walking a little more than you did the day before. Do not drive until your doctor says it is okay. It is normal to feel sad or depressed after a stroke. If the blues last, talk to your doctor. If you are having problems with urine leakage, go to the bathroom at regular times, including when you first wake up and at bedtime. Also, limit fluids after dinner. If you are constipated, drink plenty of fluids, enough so that your urine is light yellow or clear like water. If you have kidney, heart, or liver disease and have to limit fluids, talk with your doctor before you increase the amount of fluids you drink. Set up a regular time for using the toilet. If you continue to have constipation, your doctor may suggest using a bulking agent, such as Metamucil, or a stool softener, laxative, or enema. Medicines Take your medicines exactly as prescribed. Call your doctor if you think you are having a problem with your medicine. You may be taking several medicines. ACE (angiotensin-converting enzyme) inhibitors, angiotensin II receptor blockers (ARBs), beta-blockers, diuretics (water pills), and calcium channel blockers control your blood pressure. Statins help lower cholesterol. Your doctor may also prescribe medicines for depression, pain, sleep problems, anxiety, or agitation. If your doctor has given you medicine that prevents blood clots, such as warfarin (Coumadin), aspirin combined with extended-release dipyridamole (Aggrenox), clopidogrel (Plavix), or aspirin to prevent another stroke, you should: 
Tell your dentist, pharmacist, and other health professionals that you take these medicines. Watch for unusual bruising or bleeding, such as blood in your urine, red or black stools, or bleeding from your nose or gums. Get regular blood tests to check your clotting time if you are taking Coumadin. Wear medical alert jewelry that says you take blood thinners. You can buy this at most drugsMyOptique Groupes. Do not take any over-the-counter medicines or herbal products without talking to your doctor first. 
If you take birth control pills or hormone replacement therapy, talk to your doctor about whether they are right for you. For family members and caregivers Make the home safe. Set up a room so that your loved one does not have to climb stairs. Be sure the bathroom is on the same floor. Move throw rugs and furniture that could cause falls, and make sure that the lighting is good. Put grab bars and seats in tubs and showers. Find out what your loved one can do and what he or she needs help with. Try not to do things for your loved one that your loved one can do on his or her own. Help him or her learn and practice new skills. Visit and talk with your loved one often. Try doing activities together that you both enjoy, such as playing cards or board games. Keep in touch with your loved one's friends as much as you can, and encourage them to visit. Take care of yourself. Do not try to do everything yourself. Ask other family members to help. Eat well, get enough rest, and take time to do things that you enjoy. Keep up with your own doctor visits, and make sure to take your medicines regularly. Get out of the house as much as you can. Join a local support group. Find out if you qualify for home health care visits to help with rehab or for adult day care. When should you call for help? Call 911 anytime you think you may need emergency care. For example, call if: 
You have signs of another stroke. These may include: 
Sudden numbness, paralysis, or weakness in your face, arm, or leg, especially on only one side of your body. New problems with walking or balance. Sudden vision changes. Drooling or slurred speech. New problems speaking or understanding simple statements, or you feel confused. A sudden, severe headache that is different from past headaches. Call 911 even if these symptoms go away in a few minutes. You cough up blood. You vomit blood or what looks like coffee grounds. You pass maroon or very bloody stools. Call your doctor now or seek immediate medical care if: 
You have new bruises or blood spots under your skin. You have a nosebleed. Your gums bleed when you brush your teeth. You have blood in your urine. Your stools are black and tarlike or have streaks of blood. You have vaginal bleeding when you are not having your period, or heavy period bleeding. You have new symptoms that may be related to your stroke, such as falls or trouble swallowing. Watch closely for changes in your health, and be sure to contact your doctor if you have any problems. Where can you learn more? Go to RTF Logic.be Enter Y924  in the search box to learn more about \"Stroke: After Your Visit\". © 6454-0854 Healthwise, [employer]. Care instructions adapted under license by [employer] (which disclaims liability or warranty for this information). This care instruction is for use with your licensed healthcare professional. If you have questions about a medical condition or this instruction, always ask your healthcare professional. Eyvonne Neat any warranty or liability for your use of this information. ipnexus Announcement We are excited to announce that we are making your provider's discharge notes available to you in ipnexus. You will see these notes when they are completed and signed by the physician that discharged you from your recent hospital stay. If you have any questions or concerns about any information you see in ipnexus, please call the Health Information Department where you were seen or reach out to your Primary Care Provider for more information about your plan of care. Introducing [facility]!    
 [employer] introduces ipnexus patient portal. Now you can access parts of your medical record, email your doctor's office, and request medication refills online. 1. In your internet browser, go to [email or web address] 2. Click on the First Time User? Click Here link in the Sign In box. You will see the New Member Sign Up page. 3. Enter your Petpace Access Code exactly as it appears below. You will not need to use this code after youve completed the sign-up process. If you do not sign up before the expiration date, you must request a new code. · Petpace Access Code: [email or web address] Expires: 12/12/2018  3:57 PM 
 
4. Enter the last four digits of your Social Security Number (xxxx) and Date of Birth (mm/dd/yyyy) as indicated and click Submit. You will be taken to the next sign-up page. 5. Create a Petpace ID. This will be your Petpace login ID and cannot be changed, so think of one that is secure and easy to remember. 6. Create a Petpace password. You can change your password at any time. 7. Enter your Password Reset Question and Answer. This can be used at a later time if you forget your password. 8. Enter your e-mail address. You will receive e-mail notification when new information is available in [address] 9. Click Sign Up. You can now view and download portions of your medical record. 10. Click the Download Summary menu link to download a portable copy of your medical information. If you have questions, please visit the Frequently Asked Questions section of the Petpace website. Remember, Petpace is NOT to be used for urgent needs. For medical emergencies, dial 911. Now available from your iPhone and Android! Introducing Kris Mackey As a Tariq Carlideep patient, I wanted to make you aware of our electronic visit tool called Kris Mackey. Tariq Alas 24/7 allows you to connect within minutes with a medical provider 24 hours a day, seven days a week via a mobile device or tablet or logging into a secure website from your computer. You can access Hear It First from anywhere in the [place]. A virtual visit might be right for you when you have a simple condition and feel like you just dont want to get out of bed, or cant get away from work for an appointment, when your regular [facility] provider is not available (evenings, weekends or holidays), or when youre out of town and need minor care. Electronic visits cost only $49 and if the Mone Arceo 24/7 provider determines a prescription is needed to treat your condition, one can be electronically transmitted to a nearby pharmacy*. Please take a moment to enroll today if you have not already done so. The enrollment process is free and takes just a few minutes. To enroll, please download the [employer] 24/PlaceFull bree to your tablet or phone, or visit [email or web address] to enroll on your computer. And, as an [address] [place] [address] patient with a [employer] account, the results of your visits will be scanned into your electronic medical record and your primary care provider will be able to view the scanned results. We urge you to continue to see your regular [facility] provider for your ongoing medical care. And while your primary care provider may not be the one available when you seek a Kris Broanneliesefin virtual visit, the peace of mind you get from getting a real diagnosis real time can be priceless. For more information on Kris NAVITIME JAPANanneliesefin, view our Frequently Asked Questions (FAQs) at [email or web address]. Sincerely, 
 
Stormy Hancock MD 
Chief Medical Officer [facility] Angelica Man *:  certain medications cannot be prescribed via Kris Brofritz Providers Seen During Your Hospitalization Provider Specialty Primary office phone Gera Sanders MD Emergency Medicine [phone] Tasneem Tang MD Internal Medicine [phone] Immunizations Administered for This Admission Name Date  
 TB Skin Test (PPD) Intradermal  Deferred (), 9/13/2018 Your Primary Care Physician (PCP) Primary Care Physician Office Phone Office Fax Kadi Singer [phone] You are allergic to the following No active allergies Recent Documentation Height Weight BMI Smoking Status 1.753 m 99.8 kg 32.49 kg/m2 Never Smoker Emergency Contacts Name Discharge Info Relation Home Work Mobile Dexter Bravo [24] [phone] Kaylie Montgomery  Daughter [21] [phone] Deidre Spencer [24] [phone] Patient Belongings The following personal items are in your possession at time of discharge: 
     Visual Aid: Glasses, With patient          Jewelry: Bracelet, Watch  Clothing: At bedside, Shirt, Pants, Footwear    Other Valuables: At bedside, Cell Phone Please provide this summary of care documentation to your next provider. Signatures-by signing, you are acknowledging that this After Visit Summary has been reviewed with you and you have received a copy. Patient Signature:  ____________________________________________________________ Date:  ____________________________________________________________  
  
Yanet Cerda Provider Signature:  ____________________________________________________________ Date:  ____________________________________________________________

## 2018-09-13 NOTE — ED NOTES
TRANSFER - OUT REPORT: 
 
Verbal report given to Pedro Abrams on Myron Slider  being transferred to [id number] for routine progression of care Report consisted of patients Situation, Background, Assessment and  
Recommendations(SBAR). Information from the following report(s) ED Summary, MAR and Recent Results was reviewed with the receiving nurse. Lines:  
Peripheral IV 09/13/18 Right Hand (Active) Opportunity for questions and clarification was provided. Patient transported with: 
Rachel

## 2018-09-14 ENCOUNTER — HOME HEALTH ADMISSION (OUTPATIENT)
Dept: HOME HEALTH SERVICES | Facility: HOME HEALTH | Age: 63
End: 2018-09-14

## 2018-09-14 ENCOUNTER — APPOINTMENT (OUTPATIENT)
Dept: MRI IMAGING | Age: 63
DRG: 074 | End: 2018-09-14
Attending: INTERNAL MEDICINE
Payer: MEDICARE

## 2018-09-14 ENCOUNTER — APPOINTMENT (OUTPATIENT)
Dept: GENERAL RADIOLOGY | Age: 63
DRG: 074 | End: 2018-09-14
Attending: INTERNAL MEDICINE
Payer: MEDICARE

## 2018-09-14 LAB
ANION GAP SERPL CALC-SCNC: 9 MMOL/L
BUN SERPL-MCNC: 17 MG/DL (ref 8–23)
CALCIUM SERPL-MCNC: 8.6 MG/DL (ref 8.3–10.4)
CHLORIDE SERPL-SCNC: 105 MMOL/L (ref 98–107)
CHOLEST SERPL-MCNC: 123 MG/DL
CO2 SERPL-SCNC: 26 MMOL/L (ref 21–32)
CREAT SERPL-MCNC: 1.02 MG/DL (ref 0.8–1.5)
ERYTHROCYTE [DISTWIDTH] IN BLOOD BY AUTOMATED COUNT: 13.2 %
EST. AVERAGE GLUCOSE BLD GHB EST-MCNC: 364 MG/DL
GLUCOSE BLD STRIP.AUTO-MCNC: 152 MG/DL (ref 65–100)
GLUCOSE BLD STRIP.AUTO-MCNC: 216 MG/DL (ref 65–100)
GLUCOSE BLD STRIP.AUTO-MCNC: 258 MG/DL (ref 65–100)
GLUCOSE BLD STRIP.AUTO-MCNC: 269 MG/DL (ref 65–100)
GLUCOSE SERPL-MCNC: 149 MG/DL (ref 65–100)
HBA1C MFR BLD: 14.3 %
HCT VFR BLD AUTO: 37.1 % (ref 41.1–50.3)
HDLC SERPL-MCNC: 40 MG/DL (ref 40–60)
HDLC SERPL: 3.1 {RATIO}
HGB BLD-MCNC: 12.8 G/DL (ref 13.6–17.2)
LDLC SERPL CALC-MCNC: 64 MG/DL
LIPID PROFILE,FLP: NORMAL
MAGNESIUM SERPL-MCNC: 1.5 MG/DL (ref 1.8–2.4)
MCH RBC QN AUTO: 30 PG (ref 26.1–32.9)
MCHC RBC AUTO-ENTMCNC: 34.5 G/DL (ref 31.4–35)
MCV RBC AUTO: 87.1 FL (ref 79.6–97.8)
MM INDURATION POC: 0 MM (ref 0–5)
NRBC # BLD: 0 K/UL (ref 0–0.2)
PLATELET # BLD AUTO: 211 K/UL (ref 150–450)
PMV BLD AUTO: 11.5 FL (ref 9.4–12.3)
POTASSIUM SERPL-SCNC: 3.7 MMOL/L (ref 3.5–5.1)
PPD POC: NEGATIVE NEGATIVE
RBC # BLD AUTO: 4.26 M/UL (ref 4.23–5.6)
SODIUM SERPL-SCNC: 140 MMOL/L (ref 136–145)
TRIGL SERPL-MCNC: 95 MG/DL (ref 35–150)
VLDLC SERPL CALC-MCNC: 19 MG/DL (ref 6–23)
WBC # BLD AUTO: 9.3 K/UL (ref 4.3–11.1)

## 2018-09-14 PROCEDURE — 70551 MRI BRAIN STEM W/O DYE: CPT

## 2018-09-14 PROCEDURE — 97165 OT EVAL LOW COMPLEX 30 MIN: CPT

## 2018-09-14 PROCEDURE — 74011250636 HC RX REV CODE- 250/636: Performed by: INTERNAL MEDICINE

## 2018-09-14 PROCEDURE — 74011636637 HC RX REV CODE- 636/637: Performed by: INTERNAL MEDICINE

## 2018-09-14 PROCEDURE — 97161 PT EVAL LOW COMPLEX 20 MIN: CPT

## 2018-09-14 PROCEDURE — 80061 LIPID PANEL: CPT

## 2018-09-14 PROCEDURE — 65270000029 HC RM PRIVATE

## 2018-09-14 PROCEDURE — 74011250637 HC RX REV CODE- 250/637: Performed by: PSYCHIATRY & NEUROLOGY

## 2018-09-14 PROCEDURE — 92610 EVALUATE SWALLOWING FUNCTION: CPT

## 2018-09-14 PROCEDURE — 74011250637 HC RX REV CODE- 250/637: Performed by: INTERNAL MEDICINE

## 2018-09-14 PROCEDURE — 85027 COMPLETE CBC AUTOMATED: CPT

## 2018-09-14 PROCEDURE — 74011000250 HC RX REV CODE- 250: Performed by: INTERNAL MEDICINE

## 2018-09-14 PROCEDURE — C8929 TTE W OR WO FOL WCON,DOPPLER: HCPCS

## 2018-09-14 PROCEDURE — 77010033678 HC OXYGEN DAILY

## 2018-09-14 PROCEDURE — 83735 ASSAY OF MAGNESIUM: CPT

## 2018-09-14 PROCEDURE — 80048 BASIC METABOLIC PNL TOTAL CA: CPT

## 2018-09-14 PROCEDURE — 74011250636 HC RX REV CODE- 250/636: Performed by: PSYCHIATRY & NEUROLOGY

## 2018-09-14 PROCEDURE — 73030 X-RAY EXAM OF SHOULDER: CPT

## 2018-09-14 PROCEDURE — 83036 HEMOGLOBIN GLYCOSYLATED A1C: CPT

## 2018-09-14 PROCEDURE — 82962 GLUCOSE BLOOD TEST: CPT

## 2018-09-14 PROCEDURE — 30233S1 TRANSFUSION OF NONAUTOLOGOUS GLOBULIN INTO PERIPHERAL VEIN, PERCUTANEOUS APPROACH: ICD-10-PCS | Performed by: INTERNAL MEDICINE

## 2018-09-14 PROCEDURE — 36415 COLL VENOUS BLD VENIPUNCTURE: CPT

## 2018-09-14 PROCEDURE — 94760 N-INVAS EAR/PLS OXIMETRY 1: CPT

## 2018-09-14 RX ORDER — BACLOFEN 10 MG/1
10 TABLET ORAL 3 TIMES DAILY
Status: DISCONTINUED | OUTPATIENT
Start: 2018-09-14 | End: 2018-09-16

## 2018-09-14 RX ORDER — INSULIN LISPRO 100 [IU]/ML
5 INJECTION, SOLUTION INTRAVENOUS; SUBCUTANEOUS
Status: DISCONTINUED | OUTPATIENT
Start: 2018-09-14 | End: 2018-09-18 | Stop reason: HOSPADM

## 2018-09-14 RX ORDER — DIPHENHYDRAMINE HYDROCHLORIDE 50 MG/ML
12.5 INJECTION, SOLUTION INTRAMUSCULAR; INTRAVENOUS DAILY
Status: DISCONTINUED | OUTPATIENT
Start: 2018-09-14 | End: 2018-09-18 | Stop reason: HOSPADM

## 2018-09-14 RX ORDER — ASPIRIN 81 MG/1
81 TABLET ORAL DAILY
Status: DISCONTINUED | OUTPATIENT
Start: 2018-09-14 | End: 2018-09-18 | Stop reason: HOSPADM

## 2018-09-14 RX ORDER — GABAPENTIN 300 MG/1
300 CAPSULE ORAL DAILY
Status: DISCONTINUED | OUTPATIENT
Start: 2018-09-14 | End: 2018-09-18 | Stop reason: HOSPADM

## 2018-09-14 RX ORDER — ESCITALOPRAM OXALATE 10 MG/1
10 TABLET ORAL DAILY
Status: DISCONTINUED | OUTPATIENT
Start: 2018-09-14 | End: 2018-09-18 | Stop reason: HOSPADM

## 2018-09-14 RX ORDER — LEVOTHYROXINE SODIUM 75 UG/1
75 TABLET ORAL
Status: DISCONTINUED | OUTPATIENT
Start: 2018-09-14 | End: 2018-09-18 | Stop reason: HOSPADM

## 2018-09-14 RX ORDER — ATORVASTATIN CALCIUM 40 MG/1
40 TABLET, FILM COATED ORAL
Status: DISCONTINUED | OUTPATIENT
Start: 2018-09-14 | End: 2018-09-18 | Stop reason: HOSPADM

## 2018-09-14 RX ORDER — ACETAMINOPHEN 500 MG
500 TABLET ORAL DAILY
Status: DISCONTINUED | OUTPATIENT
Start: 2018-09-14 | End: 2018-09-18 | Stop reason: HOSPADM

## 2018-09-14 RX ADMIN — GABAPENTIN 300 MG: 300 CAPSULE ORAL at 17:54

## 2018-09-14 RX ADMIN — INSULIN LISPRO 2 UNITS: 100 INJECTION, SOLUTION INTRAVENOUS; SUBCUTANEOUS at 09:27

## 2018-09-14 RX ADMIN — INSULIN LISPRO 4 UNITS: 100 INJECTION, SOLUTION INTRAVENOUS; SUBCUTANEOUS at 21:37

## 2018-09-14 RX ADMIN — ESCITALOPRAM OXALATE 10 MG: 10 TABLET ORAL at 09:27

## 2018-09-14 RX ADMIN — INSULIN LISPRO 6 UNITS: 100 INJECTION, SOLUTION INTRAVENOUS; SUBCUTANEOUS at 12:41

## 2018-09-14 RX ADMIN — INSULIN GLARGINE 25 UNITS: 100 INJECTION, SOLUTION SUBCUTANEOUS at 21:37

## 2018-09-14 RX ADMIN — ASPIRIN 81 MG: 81 TABLET, COATED ORAL at 09:28

## 2018-09-14 RX ADMIN — BACLOFEN 10 MG: 10 TABLET ORAL at 21:32

## 2018-09-14 RX ADMIN — HEPARIN SODIUM 5000 UNITS: 5000 INJECTION INTRAVENOUS; SUBCUTANEOUS at 05:52

## 2018-09-14 RX ADMIN — ATORVASTATIN CALCIUM 40 MG: 40 TABLET, FILM COATED ORAL at 21:32

## 2018-09-14 RX ADMIN — LEVOTHYROXINE SODIUM 75 MCG: 75 TABLET ORAL at 09:28

## 2018-09-14 RX ADMIN — DIPHENHYDRAMINE HYDROCHLORIDE 12.5 MG: 50 INJECTION, SOLUTION INTRAMUSCULAR; INTRAVENOUS at 17:54

## 2018-09-14 RX ADMIN — HEPARIN SODIUM 5000 UNITS: 5000 INJECTION INTRAVENOUS; SUBCUTANEOUS at 13:56

## 2018-09-14 RX ADMIN — PERFLUTREN 1 ML: 6.52 INJECTION, SUSPENSION INTRAVENOUS at 10:00

## 2018-09-14 RX ADMIN — ATORVASTATIN CALCIUM 40 MG: 40 TABLET, FILM COATED ORAL at 05:52

## 2018-09-14 RX ADMIN — INSULIN LISPRO 5 UNITS: 100 INJECTION, SOLUTION INTRAVENOUS; SUBCUTANEOUS at 17:54

## 2018-09-14 RX ADMIN — INSULIN LISPRO 6 UNITS: 100 INJECTION, SOLUTION INTRAVENOUS; SUBCUTANEOUS at 17:54

## 2018-09-14 RX ADMIN — HEPARIN SODIUM 5000 UNITS: 5000 INJECTION INTRAVENOUS; SUBCUTANEOUS at 21:32

## 2018-09-14 RX ADMIN — BACLOFEN 10 MG: 10 TABLET ORAL at 17:54

## 2018-09-14 RX ADMIN — ACETAMINOPHEN 500 MG: 500 TABLET, FILM COATED ORAL at 17:54

## 2018-09-14 RX ADMIN — IMMUNE GLOBULIN (HUMAN) 30 G: 10 INJECTION INTRAVENOUS; SUBCUTANEOUS at 18:34

## 2018-09-14 NOTE — DIABETES MGMT
Patient admitted 16 with stroke like episode and hyperglycemia seen by RNJUAN. 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 HbA1c 14.3 (eA). Blood glucose on admission 781. Prior HbA1c on 17 was 6.3. . Pt states that his 25year old daughter is a type 1 diabetic. Pt given educational material, \"Diabetes Self-Management: A Patient Teaching Guide\", which was reviewed with pt. Explained basic physiology of diabetes, as well as causes, s/s, and treatments for hypoglycemia and hyperglycemia. Described the effects of poor glycemic control on the development of long-term complications such as renal, eye, nerve, and cardiovascular disease. Described proper diabetic foot care and the importance of checking feet qd. Educated re: effects of carbohydrates on blood glucose, the \"plate method\" of healthy meal planning, basics of healthy meal plan, and Consistent Carbohydrate Diet. Also explained the relationship between hyperglycemia and infection. Discussed target goals for blood glucose and A1C. Pt verbalizes understanding of teaching. Explained the importance of blood glucose monitoring. Recommended frequency of qid ac, hs, and to record in log book to take to PCP appointment. Provided blood glucose meter, strips, and instructed pt in use of meter. Pt will need prescriptions for blood glucose test strips and lancets at discharge. Educated pt re: current basal/bolus regimen of Lantus 25 units hs and Humalog sliding scale coverage 4x/day ac and hs, including type of insulin, timing with meals, onset, duration of effect, and peak of insulin dose. Pt verbalizes understanding. Attempted insulin instruction, but pt states that he knows how to give insulin injections and that he had help at home from his daughter if needed.  Encouraged pt to practice insulin self-injection under RN supervision when insulin dose is due. Pt has some weakness and decreased dexterity in his left hand from previous stroke which has increased since this last episode. Would recommend home health nursing for supervision of diabetes regimen. Stressed the importance of follow up care for diabetes management with PCP. Pt is seen by Dr. Carly Thomas. Discussed the importance of following a consistent carbohydrate diet, monitoring blood glucose qid and bringing log book to PCP appointments for medication titration, and medication compliance. Pt verbalizes understanding and has no further questions at this time.

## 2018-09-14 NOTE — PROGRESS NOTES
Care Management Interventions PCP Verified by CM: Yes Mode of Transport at Discharge: Other (see comment) Transition of Care Consult (CM Consult): Home Health [address]: Yes Physical Therapy Consult: Yes Current Support Network: Lives with Caregiver Confirm Follow Up Transport: Family Plan discussed with Pt/Family/Caregiver: Yes Freedom of Choice Offered: Yes Discharge Location Discharge Placement: Home with home health Saw pt in interdisciplinarily rounds, plan of care and discharge date/ location discussed. Per the hospitalitis plan to d/c pt home on 9/15-9/16. [facility] [place] services have been ordered for PT/RN/Aide.

## 2018-09-14 NOTE — PROGRESS NOTES
Hospitalist Progress Note Admit Date:  2018  3:53 PM  
Name:  Jana Whitley Age:  70 y.o. 
:  1955 MRN:  [id number] PCP:  Mimi Shine MD 
Treatment Team: Attending Provider: Davion Landon MD; Consulting Provider: Zehra Kathleen DO Subjective:  
Resting comfortably, says his arm pain is a little worse today. Has ambulated. A1C very high, he spoke with DM educator earlier. ROM seems to be improved in LUE. No new neuro complaints. ROS otherwise negative. Objective:  
Patient Vitals for the past 24 hrs: 
 Temp Pulse Resp BP SpO2  
18 1210 95.9 °F (35.5 °C) 71 20 128/83 97 % 18 0754 - - - - 96 % 18 0749 97.3 °F (36.3 °C) 60 16 98/62 99 % 18 0650 - - - (!) 89/46 -  
18 0554 96.3 °F (35.7 °C) (!) 59 16 (!) 82/60 97 % 18 0226 - (!) 59 - - -  
18 96.4 °F (35.8 °C) (!) 59 20 121/80 95 % 18 1941 - (!) 57 9 116/70 92 % 18 1921 - (!) 58 21 119/81 95 % 18 1915 - 63 13 130/80 96 % 18 1703 - 74 19 99/63 94 % 18 1630 - 86 10 118/69 94 % 18 1615 - 87 8 114/62 94 % 18 1600 - 87 14 117/67 97 % 18 1550 98.2 °F (36.8 °C) 85 22 139/70 - Oxygen Therapy O2 Sat (%): 97 % (18 1210) Pulse via Oximetry: 58 beats per minute (18 0754) O2 Device: Nasal cannula (18) O2 Flow Rate (L/min): 1 l/min (18) Intake/Output Summary (Last 24 hours) at 18 1508 Last data filed at 18 1749 Gross per 24 hour Intake             2000 ml Output                0 ml Net             2000 ml General:    Well nourished. Alert. CV:   RRR. No murmur, rub, or gallop. Lungs:   CTAB. No wheezing, rhonchi, or rales. Abdomen:   Soft, nontender, nondistended. Extremities: Warm and dry. No cyanosis or edema. LUE with limited ROM due to pain. Skin:     No rashes or jaundice. Neuro:  No gross focal deficits. Data Review: I have reviewed all labs, meds, telemetry events, and studies from the last 24 hours: 
 
Recent Results (from the past 24 hour(s)) EKG, 12 LEAD, INITIAL Collection Time: 09/13/18  3:52 PM  
Result Value Ref Range Ventricular Rate 88 BPM  
 Atrial Rate 88 BPM  
 P-R Interval 192 ms QRS Duration 94 ms Q-T Interval 362 ms QTC Calculation (Bezet) 438 ms Calculated P Axis 59 degrees Calculated R Axis 16 degrees Calculated T Axis 48 degrees Diagnosis    
  !! AGE AND GENDER SPECIFIC ECG ANALYSIS !! Normal sinus rhythm Low voltage QRS Borderline ECG When compared with ECG of 27-NOV-2017 18:05, 
T wave amplitude has decreased in Anterior leads Confirmed by ST NILS SHARMA MD (), LAKESHA CHAPIN ([id number]) on 9/13/2018 9:52:18 PM 
  
POC PT/INR Collection Time: 09/13/18  3:54 PM  
Result Value Ref Range Prothrombin time (POC) 11.9 (H) 9.6 - 11.6 SECS  
 INR (POC) 1.0 0.9 - 1.2 GLUCOSE, POC Collection Time: 09/13/18  3:54 PM  
Result Value Ref Range Glucose (POC) >600 (HH) 65 - 100 mg/dL CBC WITH AUTOMATED DIFF Collection Time: 09/13/18  4:00 PM  
Result Value Ref Range WBC 6.1 4.3 - 11.1 K/uL  
 RBC 4.79 4.23 - 5.6 M/uL  
 HGB 14.6 13.6 - 17.2 g/dL HCT 41.4 41.1 - 50.3 % MCV 86.4 79.6 - 97.8 FL  
 MCH 30.5 26.1 - 32.9 PG  
 MCHC 35.3 (H) 31.4 - 35.0 g/dL  
 RDW 12.8 % PLATELET 204 177 - 263 K/uL MPV 12.3 9.4 - 12.3 FL ABSOLUTE NRBC 0.00 0.0 - 0.2 K/uL  
 DF AUTOMATED NEUTROPHILS 55 43 - 78 % LYMPHOCYTES 32 13 - 44 % MONOCYTES 9 4.0 - 12.0 % EOSINOPHILS 3 0.5 - 7.8 % BASOPHILS 1 0.0 - 2.0 % IMMATURE GRANULOCYTES 0 0.0 - 5.0 %  
 ABS. NEUTROPHILS 3.4 1.7 - 8.2 K/UL  
 ABS. LYMPHOCYTES 2.0 0.5 - 4.6 K/UL  
 ABS. MONOCYTES 0.5 0.1 - 1.3 K/UL  
 ABS. EOSINOPHILS 0.2 0.0 - 0.8 K/UL  
 ABS. BASOPHILS 0.0 0.0 - 0.2 K/UL  
 ABS. IMM. GRANS. 0.0 0.0 - 0.5 K/UL METABOLIC PANEL, COMPREHENSIVE Collection Time: 09/13/18  4:00 PM  
Result Value Ref Range Sodium 126 (L) 136 - 145 mmol/L Potassium 4.3 3.5 - 5.1 mmol/L Chloride 91 (L) 98 - 107 mmol/L  
 CO2 23 21 - 32 mmol/L Anion gap 12 mmol/L Glucose 781 (HH) 65 - 100 mg/dL BUN 21 8 - 23 MG/DL Creatinine 1.76 (H) 0.8 - 1.5 MG/DL  
 GFR est AA 50 (L) >60 ml/min/1.73m2 GFR est non-AA 42 ml/min/1.73m2 Calcium 9.3 8.3 - 10.4 MG/DL Bilirubin, total 0.3 0.2 - 1.1 MG/DL  
 ALT (SGPT) 26 12 - 65 U/L  
 AST (SGOT) 17 15 - 37 U/L Alk. phosphatase 188 (H) 50 - 136 U/L Protein, total 7.5 g/dL Albumin 4.0 3.2 - 4.6 g/dL Globulin 3.5 2.3 - 3.5 g/dL A-G Ratio 1.1 PTT Collection Time: 09/13/18  4:00 PM  
Result Value Ref Range aPTT 25.2 23.2 - 35.3 SEC  
D DIMER Collection Time: 09/13/18  4:00 PM  
Result Value Ref Range D DIMER 0.60 (HH) <0.56 ug/ml(FEU) TROPONIN I Collection Time: 09/13/18  4:00 PM  
Result Value Ref Range Troponin-I, Qt. <0.02 (L) 0.02 - 0.05 NG/ML  
BLOOD GAS, VENOUS Collection Time: 09/13/18  4:22 PM  
Result Value Ref Range VENOUS PH 7.36    
 VENOUS PCO2 37.5 mmHg VENOUS PO2 64 mmHg VENOUS BICARBONATE 21 mmol/L  
 VENOUS BASE DEFICIT 4.0 mmol/L  
 TOTAL HEMOGLOBIN 15.1 (H) 11.7 - 15.0 GM/DL VENOUS O2 SATURATION 92 % VENOUS O2 HGB 91.3 % CARBOXYHEMOGLOBIN 0.3 (L) 0.5 - 1.5 % METHEMOGLOBIN 0.3 0.0 - 1.5 % DEOXYHEMOGLOBIN 8 (H) 0.0 - 5.0 % SITE RR   
 ALLENS TEST NA   
 MODE RA   
GLUCOSE, POC Collection Time: 09/13/18  5:51 PM  
Result Value Ref Range Glucose (POC) >600 (HH) 65 - 100 mg/dL GLUCOSE, POC Collection Time: 09/13/18  7:22 PM  
Result Value Ref Range Glucose (POC) 588 (HH) 65 - 100 mg/dL GLUCOSE, POC Collection Time: 09/13/18 10:56 PM  
Result Value Ref Range Glucose (POC) 259 (H) 65 - 100 mg/dL CBC W/O DIFF Collection Time: 09/14/18  6:41 AM  
Result Value Ref Range WBC 9.3 4.3 - 11.1 K/uL  
 RBC 4.26 4.23 - 5.6 M/uL  
 HGB 12.8 (L) 13.6 - 17.2 g/dL HCT 37.1 (L) 41.1 - 50.3 % MCV 87.1 79.6 - 97.8 FL  
 MCH 30.0 26.1 - 32.9 PG  
 MCHC 34.5 31.4 - 35.0 g/dL  
 RDW 13.2 % PLATELET 080 884 - 058 K/uL MPV 11.5 9.4 - 12.3 FL ABSOLUTE NRBC 0.00 0.0 - 0.2 K/uL LIPID PANEL Collection Time: 18  6:41 AM  
Result Value Ref Range LIPID PROFILE Cholesterol, total 123 MG/DL Triglyceride 95 35 - 150 MG/DL  
 HDL Cholesterol 40 40 - 60 MG/DL  
 LDL, calculated 64 <100 MG/DL VLDL, calculated 19 6.0 - 23.0 MG/DL  
 CHOL/HDL Ratio 3.1 <200 HEMOGLOBIN A1C WITH EAG Collection Time: 18  6:41 AM  
Result Value Ref Range Hemoglobin A1c 14.3 % Est. average glucose 364 mg/dL METABOLIC PANEL, BASIC Collection Time: 18  6:41 AM  
Result Value Ref Range Sodium 140 136 - 145 mmol/L Potassium 3.7 3.5 - 5.1 mmol/L Chloride 105 98 - 107 mmol/L  
 CO2 26 21 - 32 mmol/L Anion gap 9 mmol/L Glucose 149 (H) 65 - 100 mg/dL BUN 17 8 - 23 MG/DL Creatinine 1.02 0.8 - 1.5 MG/DL  
 GFR est AA >60 >60 ml/min/1.73m2 GFR est non-AA >60 ml/min/1.73m2 Calcium 8.6 8.3 - 10.4 MG/DL MAGNESIUM Collection Time: 18  6:41 AM  
Result Value Ref Range Magnesium 1.5 (L) 1.8 - 2.4 mg/dL GLUCOSE, POC Collection Time: 18  7:22 AM  
Result Value Ref Range Glucose (POC) 152 (H) 65 - 100 mg/dL GLUCOSE, POC Collection Time: 18 11:59 AM  
Result Value Ref Range Glucose (POC) 258 (H) 65 - 100 mg/dL All Micro Results None Results for orders placed or performed during the hospital encounter of 18  
2D ECHO COMPLETE ADULT (TTE) W OR WO CONTR Narrative [address] [place], [address] [facility] 
[phone] Transthoracic Echocardiogram 
2D, M-mode, Doppler, and Color Doppler Patient: Fabiano Montelongo 
MR #: [id number] : 1955 Age: 70 years Gender: Male Study date: 14-Sep-2018 Account #: [de-identified] Height: 69 in 
Weight: 224.6 lb 
BSA: 2.17 mï¾² Status:Routine Location: 359 BP: 89/ 46 Allergies: NO KNOWN ALLERGIES Sonographer:  Maria T Hall [facility] Group:  [address] Cardiology Referring Physician:  Kwan Duvall MD 
Reading Physician:  Sarah Haji. Mica Simpson MD [facility] INDICATIONS: TIA with transient neurological disturbance. PROCEDURE: This was a routine study. A transthoracic echocardiogram was 
performed. The study included complete 2D imaging, M-mode, complete spectral 
Doppler, and color Doppler. Intravenous contrast (Definity) was administered. Image quality was adequate. LEFT VENTRICLE: Size was normal. Systolic function was normal. Ejection 
fraction was estimated in the range of 55 % to 60 %. There were no regional 
wall motion abnormalities. Wall thickness was mildly increased. Avg. E/e'= 
11.6. Left ventricular diastolic function parameters were normal. 
 
RIGHT VENTRICLE: The size was normal. Systolic function was normal. The 
tricuspid jet envelope definition was inadequate for estimation of RV  
systolic 
pressure. LEFT ATRIUM: Size was normal. 
 
ATRIAL SEPTUM: Contrast injection was performed. There was no right-to-left 
shunt, with provocative maneuvers to increase right atrial pressure. RIGHT ATRIUM: Size was normal. 
 
SYSTEMIC VEINS: IVC: The inferior vena cava was normal in size and course. AORTIC VALVE: The valve was trileaflet. Leaflets exhibited mild sclerosis. There was no evidence for stenosis. There was no insufficiency. MITRAL VALVE: There was mild calcification of the anterior leaflet. There was 
no evidence for stenosis. There was no regurgitation. TRICUSPID VALVE: The valve structure was normal. There was no evidence for 
stenosis. There was trace regurgitation. PULMONIC VALVE: Not well visualized. There was no evidence for stenosis. There 
was trace insufficiency. PERICARDIUM: There was no pericardial effusion. AORTA: The root exhibited dilatation. (4.3cm) There was mild dilatation of  
the 
ascending aorta. (4.0 cm) SUMMARY: 
 
-  Left ventricle: Systolic function was normal. Ejection fraction was 
estimated in the range of 55 % to 60 %. There were no regional wall motion 
abnormalities. Wall thickness was mildly increased. Avg. E/e'= 11.6. Left 
ventricular diastolic function parameters were normal. 
 
-  Atrial septum: Contrast injection was performed. There was no  
right-to-left 
shunt, with provocative maneuvers to increase right atrial pressure. -  Aorta, systemic arteries: There was mild dilatation of the ascending  
aorta. (4.0 cm) SYSTEM MEASUREMENT TABLES 
 
2D mode AoR Diam (2D): 4.3 cm 
LA Dimension (2D): 3.5 cm Left Atrium Systolic Volume Index; Method of Disks, Biplane; 2D mode;: 28.6 
ml/m2 IVS/LVPW (2D): 1 IVSd (2D): 1.1 cm 
LVIDd (2D): 4.2 cm LVIDs (2D): 2.1 cm 
LVOT Area (2D): 4.9 cm2 LVPWd (2D): 1.1 cm RVIDd (2D): 3.5 cm Tissue Doppler Imaging LV Peak Early Monahan Tissue Carlin; Lateral MA (TDI): 9.1 cm/s LV Peak Early Monahan Tissue Carlin; Medial MA (TDI): 6 cm/s Unspecified Scan Mode Peak Grad; Mean; Antegrade Flow: 6 mm[Hg] Vmax; Antegrade Flow: 123 cm/s LVOT Diam: 2.5 cm 
MV Peak Carlin/LV Peak Tissue Carlin E-Wave; Lateral MA: 9.2 MV Peak Carlin/LV Peak Tissue Carlin E-Wave; Medial MA: 14 Prepared and signed by 
 
Manan Hart. Abbey Chan MD [facility] Signed 14-Sep-2018 14:37:09 Current Meds: 
Current Facility-Administered Medications Medication Dose Route Frequency  aspirin delayed-release tablet 81 mg  81 mg Oral DAILY  atorvastatin (LIPITOR) tablet 40 mg  40 mg Oral QHS  escitalopram oxalate (LEXAPRO) tablet 10 mg  10 mg Oral DAILY  levothyroxine (SYNTHROID) tablet 75 mcg  75 mcg Oral ACB  insulin lispro (HUMALOG) injection 5 Units  5 Units SubCUTAneous TIDAC  tuberculin injection 5 Units  5 Units IntraDERMal ONCE  
  ondansetron (ZOFRAN) injection 4 mg  4 mg IntraVENous Q6H PRN  
 heparin (porcine) injection 5,000 Units  5,000 Units SubCUTAneous Q8H  
 acetaminophen (TYLENOL) tablet 650 mg  650 mg Oral Q6H PRN  
 insulin glargine (LANTUS) injection 25 Units  25 Units SubCUTAneous QHS  insulin lispro (HUMALOG) injection   SubCUTAneous AC&HS Other Studies (last 24 hours): 
Xr Shoulder Lt Ap/lat Min 2 V Result Date: 9/14/2018 THREE-VIEW LEFT SHOULDER: CLINICAL HISTORY:  Moderate left shoulder pain and limited range of motion for one month. COMPARISON:  Portable chest of September 13, 2018. FINDINGS:  No definite fracture, malalignment, or fish bone destruction is evident. No persistent radiopaque foreign body is seen. There is severe acromioclavicular osteoarthritis with prominent inferior spurring. There are overlying radiopaque support devices. IMPRESSION:  Acromioclavicular osteoarthritis with inferior spurring may predispose to impingement. Followup MRI of the shoulder would be useful for further evaluation of possible associated rotator cuff pathology, if clinically indicated. Mri Brain Wo Cont Result Date: 9/14/2018 MRI BRAIN WITHOUT CONTRAST 9/14/2018 HISTORY: Pain in left arm. Frozen shoulder. Recent stroke earlier this year. TECHNIQUE: Sagittal and axial T1-weighted, axial T2-weighted, axial and coronal FLAIR, axial T2-weighted gradient-echo, axial diffusion weighted images with ADC maps of the brain. COMPARISON: November 19, 2017 FINDINGS: There is no acute infarction, acute intracranial hemorrhage, intra-axial mass, hydrocephalus, abnormal extra-axial fluid collection. Moderate cerebral volume loss is present. On the T2 weighted and FLAIR sequences, there are a few scattered hyperintense foci within the supratentorial white matter. There has been an interval small infarct in the right thalamus and there is an old small infarct in the posterior medial left thalamus. IMPRESSION: 1. Moderate cerebral volume loss. 2. White matter findings compatible with chronic small vessel ischemic disease. 3. Subacute or old right thalamic infarct that has developed since the November 2017 MRI. Ct Head Wo Cont Result Date: 9/13/2018 CT HEAD WITHOUT CONTRAST, 9/13/2018 History: Left arm pain. Comparison: CT head without contrast 11/27/2017 Technique:   5 mm axial scans from the skull base to the vertex. All CT scans performed at this facility use one or all of the following: Automated exposure control, adjustment of the mA and/or kVp according to patient's size, iterative reconstruction. Findings:  No evidence of intracranial hemorrhage is seen. No abnormal extra-axial fluid collections are seen. Moderate cortical and positional changes are seen which appear advanced for the patient's age. No evidence for acute hydrocephalus is seen. No evidence of midline shift or herniation is seen. No abnormal edema pattern is seen in a vascular distribution to suggest large artery infarction. Evaluation with bone windows shows no acute osseous changes. The visualized sinuses, middle ears, and mastoid air cells are well aerated. IMPRESSION:  1. No acute intracranial process evident by noncontrast CT study of the head. Xr Chest [facility] Result Date: 9/13/2018 Portable chest x-ray CLINICAL INDICATION: Shortness of breath FINDINGS: Single AP view the chest compared to a similar exam dated 3/19/2015 show the lungs to be expanded and clear. No pleural effusion or pneumothorax. IMPRESSION: Unremarkable portable chest x-ray. Assessment and Plan:  
 
Hospital Problems as of 9/14/2018  Never Reviewed Codes Class Noted - Resolved POA Major depression ICD-10-CM: F32.9 ICD-9-CM: 296.20  9/13/2018 - Present Yes Hyperglycemia ICD-10-CM: R73.9 ICD-9-CM: 790.29  9/13/2018 - Present Unknown [facility] ICD-10-CM: I63.9 ICD-9-CM: 434.91  9/13/2018 - Present Unknown Thalamic infarct, acute ([facility] ICD-10-CM: I63.9 ICD-9-CM: 434.91  11/21/2017 - Present Hyperlipidemia ICD-10-CM: E78.5 ICD-9-CM: 272.4  11/19/2017 - Present Yes * (Principal)TIA (transient ischemic attack) ICD-10-CM: G45.9 ICD-9-CM: 435.9  11/18/2017 - Present Yes Hypertension ICD-10-CM: I10 
ICD-9-CM: 401.9  11/18/2017 - Present Yes Acquired hypothyroidism (Chronic) ICD-10-CM: E03.9 ICD-9-CM: 244.9  11/18/2017 - Present Yes Plan: # Uncontrolled new onset DM 
 - A1C 14, started on basal/bolus/sliding scale insulin 
 - Statin, DM educator - Needs close outpt follow up and therapeutic lifestyle changes # Progressive left sided weakness/numbness 
             - Head CT neg; MRI brain with old thalamic infarct noted on head CT from 11/2017 
 - ROM of LUE is actually better 9/14. I suspect his LUE weakness may be more related to his shoulder arthropathy based on the xray. However, he does have risk factors for CVA. # Left shoulder pain 
 - Xray reviewed; likely impingement - Needs MRI outpt # SHANTELL 
                      - Resolved 
  
# HTN 
                      - BPs still slightly low, pt asymptomatic, but will hold meds again today. 
  
# Hyponatremia - Resolved with sugar control 
  
# PPx 
                      - GI: not indicated 
                      - DVT: heparin 
  
# FEN/FIT 
                      - IVFs; replete lytes prn; DM diet - No grewal; PIV; remote tele 
  
# Dispo - Blood sugar control, MRI non-acute. If sugars better possibly dc tomorrow. DC planning/Dispo:   
Diet:  DIET DIABETIC CONSISTENT CARB Signed: 
Raeann Singh MD

## 2018-09-14 NOTE — PROGRESS NOTES
Speech language pathology: bedside swallow note: iNITIAL ASSESSMENT AND dISCHARGE 
 
NAME/AGE/GENDER: Tianna Lau is a 70 y.o. male DATE: 9/14/2018 PRIMARY DIAGNOSIS: Stroke-like episode ([facility] Hyperglycemia ICD-10: Treatment Diagnosis: Oropharyngeal dysphagia (R13.12) INTERDISCIPLINARY COLLABORATION: speech therapist 
PRECAUTIONS/ALLERGIES: Review of patient's allergies indicates no known allergies. ASSESSMENT:Based on the objective data described below, Mr. Sherryle Evangelist presents with oral and pharyngeal phase of swallow within functional limits with no overt signs or symptoms of aspiration observed with any consistency assessed. Swallows of all textures timely, clear to cervical auscultation and with adequate laryngeal excursion. Voice clear after swallow. No oral residue observed. Conversational speech clear and appropriate. Recommend continue current regular texture diet and thin liquids. Give meds with water. No further skilled speech/swallow intervention currently indicated. PLAN OF CARE:  
Patient will benefit from skilled intervention to address the following impairments. RECOMMENDATIONS AND PLANNED INTERVENTIONS (Benefits and precautions of therapy have been discussed with the patient.): 
· continue prescribed diet MEDICATIONS: 
· With water COMPENSATORY STRATEGIES/MODIFICATIONS INCLUDING: 
· Upright for all PO 
RECOMMENDED REHABILITATION/EQUIPMENT: (at time of discharge pending progress): Due to the probability of continued deficits (see above) this patient will not likely need continued skilled speech therapy after discharge. SUBJECTIVE:  
\"I don't want to stay here this weekend. \" History of Present Injury/Illness: Mr. Sherryle Evangelist  has a past medical history of Chronic kidney disease; Hypertension; Major depression; Stroke ([facility] (11/2017); and Thyroid disease. Yen Ferrera He also  has no past surgical history on file. Present Symptoms: TIA Pain Intensity 1: 0 Pain Location 1: Arm, Shoulder Pain Orientation 1: Left Current Medications: No current facility-administered medications on file prior to encounter. Current Outpatient Prescriptions on File Prior to Encounter Medication Sig Dispense Refill  baclofen (LIORESAL) 10 mg tablet Take 1 Tab by mouth three (3) times daily. (Patient taking differently: Take 20 mg by mouth four (4) times daily. ) 15 Tab 0  
 valsartan 320 mg tab 320 mg, hydroCHLOROthiazide 25 mg tab 25 mg Take 1 Dose by mouth daily.  levothyroxine (SYNTHROID) 75 mcg tablet Take 75 mcg by mouth Daily (before breakfast).  escitalopram oxalate (LEXAPRO) 10 mg tablet Take 20 mg by mouth daily. Current Dietary Status:  Regular textures, thin liquids Social History/Home Situation:  
Home Environment: Apartment One/Two Story Residence: One story Living Alone: No 
Support Systems: Child(anuradha) Patient Expects to be Discharged to[de-identified] [facility] Current DME Used/Available at Home: Shower chair OBJECTIVE:  
Respiratory Status:  Nasal cannula  1 l/min CXR Results:IMPRESSION: Unremarkable portable chest x-ray. MRI/CT Results:IMPRESSION: 
1. Moderate cerebral volume loss. 2. White matter findings compatible with chronic small vessel ischemic disease. 3. Subacute or old right thalamic infarct that has developed since the November 2017 MRI. Cognitive and Communication Status: 
Neurologic State: Alert Orientation Level: Appropriate for age;Oriented X4 Cognition: Follows commands Perception: Appears intact Perseveration: No perseveration noted Safety/Judgement: Awareness of environment; Insight into deficits BEDSIDE SWALLOW EVALUATION Oral Assessment: 
Oral Assessment Labial: No impairment Dentition: Intact Oral Hygiene: adequate Lingual: No impairment Velum: No impairment P.O. Trials: 
Patient Position: upright in bed The patient was given the following:  
Consistency Presented: Thin liquid; Solid;Puree;Mixed consistency How Presented: Self-fed/presented;Cup/sip;Cup/gulp; Spoon;Straw;Successive swallows ORAL PHASE: 
Bolus Acceptance: No impairment Bolus Formation/Control: No impairment Propulsion: No impairment Oral Residue: None PHARYNGEAL PHASE: 
Initiation of Swallow: No impairment Laryngeal Elevation: Functional 
Aspiration Signs/Symptoms: None Vocal Quality: No impairment Pharyngeal Phase Characteristics: No impairment, issues, or problems OTHER OBSERVATIONS: 
Rate/bite size: WNL Endurance: WNL Tool Used: Dysphagia Outcome and Severity Scale (TIBURCIO) Score Comments Normal Diet  [x] 7 With no strategies or extra time needed Functional Swallow  [] 6 May have mild oral or pharyngeal delay Mild Dysphagia 
  [] 5 Which may require one diet consistency restricted (those who demonstrate penetration which is entirely cleared on MBS would be included) Mild-Moderate Dysphagia  [] 4 With 1-2 diet consistencies restricted Moderate Dysphagia  [] 3 With 2 or more diet consistencies restricted Moderately Severe Dysphagia  [] 2 With partial PO strategies (trials with ST only) Severe Dysphagia  [] 1 With inability to tolerate any PO safely Score:  Initial: 7 Most Recent: X (Date: -- ) Interpretation of Tool: The Dysphagia Outcome and Severity Scale (TIBURCIO) is a simple, easy-to-use, 7-point scale developed to systematically rate the functional severity of dysphagia based on objective assessment and make recommendations for diet level, independence level, and type of nutrition. Score 7 6 5 4 3 2 1 Modifier CH CI CJ CK CL CM CN ? Swallowing:  
  - CURRENT STATUS: CH - 0% impaired, limited or restricted  - GOAL STATUS:  CH - 0% impaired, limited or restricted  - D/C STATUS:  CH - 0% impaired, limited or restricted [place] / [place] / Product Type: Medicare /  
 
TREATMENT:  
 (In addition to Assessment/Re-Assessment sessions the following treatments were rendered) Assessment/Reassessment only, no treatment provided today. Safety: After treatment position/precautions: · Upright in Bed Treatment Assessment:  No further skilled speech/swallow intervention currently recommended. Total Treatment Duration: 
Time In: 7174 Time Out: 9720 Inés Weeks MA, CCC-SLP

## 2018-09-14 NOTE — PROGRESS NOTES
Patient is alert and oriented x 3, no sign of distress. Asymptomatic, Dr Karl Hays called , he requested to monitor pt BP. No further order received.

## 2018-09-14 NOTE — PROGRESS NOTES
Care Management Interventions PCP Verified by CM: Yes Mode of Transport at Discharge: Other (see comment) Transition of Care Consult (CM Consult): Other Current Support Network: Lives with Caregiver Confirm Follow Up Transport: Family Plan discussed with Pt/Family/Caregiver: Yes Freedom of Choice Offered: Yes Discharge Location Discharge Placement: Home Visited with pt regarding plans for discharge, pt plans to return home with possible [facility] services. Pt's dtr lives with him, drives (some)/cooks and inde with ADL's. Has no needs at this time, will continue to follow to aid in any services.

## 2018-09-14 NOTE — PROGRESS NOTES
Bedside report received from [facility] [address]. Pt in bed this AM, assessment complete. Neuro checks per doc flowsheet. MRI checklist complete. Pt with brother at side. RR even, unlabored, no noted distress. Bed L/L, call bell is within reach and side rails are up x 2.

## 2018-09-14 NOTE — PROGRESS NOTES
Problem: Mobility Impaired (Adult and Pediatric) Goal: *Acute Goals and Plan of Care (Insert Text) LTG: 
(1.)Mr. Vilma Bush will move from supine to sit and sit to supine  in bed with MODIFIED INDEPENDENCE within 7 treatment day(s). (2.)Mr. Vilma Bush will transfer from bed to chair and chair to bed with STAND BY ASSIST using the least restrictive device within 7 treatment day(s). (3.)Mr. Vilma Bush will ambulate with STAND BY ASSIST for 200 feet with the least restrictive device within 7 treatment day(s). ________________________________________________________________________________________________ PHYSICAL THERAPY: Initial Assessment, PM 9/14/2018 INPATIENT: Hospital Day: 2 [place] / Product Type: Medicare /  
  
NAME/AGE/GENDER: Mryon Guardado is a 70 y.o. male PRIMARY DIAGNOSIS: Stroke-like episode ([facility] [address] Hyperglycemia TIA (transient ischemic attack) TIA (transient ischemic attack) ICD-10: Treatment Diagnosis:  
 · Other abnormalities of gait and mobility (R26.89) · Ataxic gait (R26.0) Precaution/Allergies: 
Review of patient's allergies indicates no known allergies. ASSESSMENT:  
 
Mr. Vilma Bush presents with limited independence with bed mobility, transfers and gait. He reports balance and gait stability are worse than normal. Reports numbness and tingling in L LE. Not very agreeable to the thought of using a cane for increased gait stability. Will follow to increase independence and safety with mobility. HHPT recommended This section established at most recent assessment PROBLEM LIST (Impairments causing functional limitations): 1. Decreased Strength 2. Decreased Transfer Abilities 3. Decreased Ambulation Ability/Technique 4. Decreased Balance INTERVENTIONS PLANNED: (Benefits and precautions of physical therapy have been discussed with the patient.) 1. Bed Mobility 2. Gait Training 3. Transfer Training TREATMENT PLAN: Frequency/Duration: daily for duration of hospital stay Rehabilitation Potential For Stated Goals: Good RECOMMENDED REHABILITATION/EQUIPMENT: (at time of discharge pending progress): Due to the probability of continued deficits (see above) this patient will likely need continued skilled physical therapy after discharge. Equipment:  
? None at this time HISTORY:  
History of Present Injury/Illness (Reason for Referral): Mr. Shani Briseno is a 71 y/o WM with a h/o CVA in 11/2017 with residual LUE weakness, HTN, MDD and hypothyroidism presenting today with c/o worsening left arm weakness for several days. His daughter is at the bedside and says patient was complaining to her uncle that the symptoms have been present for over 1 week. Apparently the patient had previously said he didn't see the point in coming in sooner because he didn't think anything was done for him during his last stroke. He called his PCP today who instructed him to come to the ED. He had started lifting weights and thought he had injured his arm that way, however his weakness and numbness persisted. He also c/o some left sided weakness. No facial droop or slurred speech was noted. Initial head CT here was unremarkable. His labs show hyponatremia, hyperglycemia and SHANTELL. A1C 11/2017 was 6.3. 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 He says he was compliant with PT after that. ROS otherwise negative. Past Medical History/Comorbidities:  
Mr. Shani Briseno  has a past medical history of Chronic kidney disease; Hypertension; Major depression; Stroke [facility]) (11/2017); and Thyroid disease. Mr. Shani Briseno  has no past surgical history on file. Social History/Living Environment:  
Home Environment: Apartment # Steps to Enter: 0 One/Two Story Residence: One story Living Alone: No 
Support Systems: Child(anuradha) Patient Expects to be Discharged to[de-identified] [id number] Current DME Used/Available at Home: Shower chair Prior Level of Function/Work/Activity: 
Ambulatory without an assistive device. Some sporadic unsteadiness but still able to drive short distances from the house and independent with ADL's. Number of Personal Factors/Comorbidities that affect the Plan of Care: 0: LOW COMPLEXITY EXAMINATION:  
Most Recent Physical Functioning:  
Gross Assessment: 
AROM: Generally decreased, functional (meseret LE ) Strength: Generally decreased, functional (L LE > R LE) Sensation: Impaired (L LE numbness reported) Posture: 
  
Balance: 
Sitting: Intact Standing: Pull to stand; With support Bed Mobility: 
Supine to Sit: Stand-by assistance Sit to Supine: Stand-by assistance Scooting: Stand-by assistance Wheelchair Mobility: 
  
Transfers: 
Sit to Stand: Minimum assistance Stand to Sit: Minimum assistance Gait: 
  
Base of Support: Narrowed Speed/Ewa: Fluctuations; Slow Gait Abnormalities: Ataxic; Shuffling gait; Path deviations Distance (ft): 60 Feet (ft) Assistive Device:  (tight hold of gait belt but no assistive device) Ambulation - Level of Assistance: Minimal assistance;Assist x1 Interventions: Safety awareness training Body Structures Involved: 1. Joints 2. Muscles Body Functions Affected: 1. Movement Related Activities and Participation Affected: 1. Mobility Number of elements that affect the Plan of Care: 4+: HIGH COMPLEXITY CLINICAL PRESENTATION:  
Presentation: Stable and uncomplicated: LOW COMPLEXITY CLINICAL DECISION MAKIN [facility] [id number] AM-PAC 6 Clicks Basic Mobility Inpatient Short Form How much difficulty does the patient currently have. .. Unable A Lot A Little None 1. Turning over in bed (including adjusting bedclothes, sheets and blankets)? [] 1   [] 2   [x] 3   [] 4  
2.   Sitting down on and standing up from a chair with arms ( e.g., wheelchair, bedside commode, etc.)   [] 1   [] 2   [x] 3   [] 4  
3. Moving from lying on back to sitting on the side of the bed? [] 1   [] 2   [x] 3   [] 4 How much help from another person does the patient currently need. .. Total A Lot A Little None 4. Moving to and from a bed to a chair (including a wheelchair)? [] 1   [] 2   [x] 3   [] 4  
5. Need to walk in hospital room? [] 1   [] 2   [x] 3   [] 4  
6. Climbing 3-5 steps with a railing? [] 1   [] 2   [x] 3   [] 4  
© 2007, Trustees of [address] [place] [address] [address] [address], under license to [employer]. All rights reserved Score:  Initial: 18 Most Recent: X (Date: -- ) Interpretation of Tool:  Represents activities that are increasingly more difficult (i.e. Bed mobility, Transfers, Gait). Score 24 23 22-20 19-15 14-10 9-7 6 Modifier CH CI CJ CK CL CM CN   
 
? Mobility - Walking and Moving Around:  
  - CURRENT STATUS: CK - 40%-59% impaired, limited or restricted  - GOAL STATUS: CJ - 20%-39% impaired, limited or restricted  - D/C STATUS:  ---------------To be determined--------------- [place] / [place] / Product Type: Medicare /   
 
Medical Necessity:    
· Patient is expected to demonstrate progress in strength, balance and coordination to increase independence with gait, transfers and bed mobility. Reason for Services/Other Comments: 
· Patient continues to require skilled intervention due to limited functional independence. Use of outcome tool(s) and clinical judgement create a POC that gives a: Clear prediction of patient's progress: LOW COMPLEXITY  
  
 
 
 
TREATMENT:  
(In addition to Assessment/Re-Assessment sessions the following treatments were rendered) Pre-treatment Symptoms/Complaints:  Heavy painful L shoulder and trunk Pain: Initial: 7 Post Session:  7 Assessment/Reassessment only, no treatment provided today Braces/Orthotics/Lines/Etc:  
· O2 Device: Nasal cannula Treatment/Session Assessment:   
· Response to Treatment:  Tolerated therapy fine · Interdisciplinary Collaboration:  
o Physical Therapist 
o Occupational Therapist 
o  · After treatment position/precautions:  
o Supine in bed 
o Bed alarm/tab alert on 
o Bed/Chair-wheels locked 
o Bed in low position 
o Call light within reach 
o RN notified 
o Family at bedside · Compliance with Program/Exercises: compliant most of the time. · Recommendations/Intent for next treatment session: \"Next visit will focus on advancements to more challenging activities and reduction in assistance provided\". Total Treatment Duration: PT Patient Time In/Time Out Time In: 1430 Time Out: 1440 Kathy Cason PT

## 2018-09-14 NOTE — PROGRESS NOTES
Problem: Self Care Deficits Care Plan (Adult) Goal: *Acute Goals and Plan of Care (Insert Text) 1. Patient will perform grooming with supervision. 2. Patient will perform Upper body dressing with supervision 3. Patient will perform lower body dressing with SBA 4. Patient will perform upper and lower body bathing with supervision. 5. Patient will perform toilet transfers with SBA 6. Patient will perform shower transfer with CGA. 7. Patient will participate in 30 + minutes of ADL/ therapeutic exercise/therapeutic activity with min rest breaks to increase activity tolerance for self care. 8. Patient will perform ADL functional mobility in room with SBA Goals to be achieved in 7 days. OCCUPATIONAL THERAPY: Initial Assessment and PM 9/14/2018 INPATIENT: Hospital Day: 2 [place] / Product Type: Medicare /  
  
NAME/AGE/GENDER: Annette Núñez is a 70 y.o. male PRIMARY DIAGNOSIS:  Stroke-like episode ([address] Hyperglycemia TIA (transient ischemic attack) TIA (transient ischemic attack) ICD-10: Treatment Diagnosis:  
 · Pain in Left Shoulder (M25.512) · Stiffness of Left Shoulder, Not elsewhere classified (M25.612) · Other lack of cordination (R27.8) · Difficulty in walking, Not elsewhere classified (R26.2) Precautions/Allergies: 
   Review of patient's allergies indicates no known allergies. ASSESSMENT:  
 
Mr. Annetet Jose presents with above diagnosis. He transferred to [facility] with SBA and was able to perform UE and LE dressing with SBA seated. He donned B shoes fig. 4 technique SBA. He reported his L UE is numb and tingling. Had increased tone and pain with ambulation 7/10 in his L side of neck, mid back, L UE. This has gotten progressively worse since his CVA a year ago. He has had residual deficits from CVA on L UE.  He ambulated with min assist and lost his balance a few times requiring min assist for correction. He returned to bed and supine. His brother was present. Patient plans to return home with has family to assist him. He would benefit from skilled OT services. Will follow. Educated patient that his balance was compromised and he should call for assistance with transfers. Bed alarm on. This section established at most recent assessment PROBLEM LIST (Impairments causing functional limitations): 1. Decreased Strength 2. Decreased ADL/Functional Activities 3. Decreased Transfer Abilities 4. Decreased Ambulation Ability/Technique 5. Decreased Balance 6. Increased Pain 7. Increased Shortness of Breath INTERVENTIONS PLANNED: (Benefits and precautions of occupational therapy have been discussed with the patient.) 1. Activities of daily living training 2. Adaptive equipment training 3. Balance training 4. Clothing management 5. Donning&doffing training 6. Neuromuscular re-eduation 7. Therapeutic activity 8. Therapeutic exercise 9. Home safety and DME  
 
TREATMENT PLAN: Frequency/Duration: Follow patient 4 times to address above goals. Rehabilitation Potential For Stated Goals: Good RECOMMENDED REHABILITATION/EQUIPMENT: (at time of discharge pending progress): Due to the probability of continued deficits (see above) this patient will likely need continued skilled occupational therapy after discharge. Equipment:  
? None at this time OCCUPATIONAL PROFILE AND HISTORY:  
History of Present Injury/Illness (Reason for Referral): Decreased self care  And functional mobiltiy Past Medical History/Comorbidities:  
Mr. Annamaria Burgos  has a past medical history of Chronic kidney disease; Hypertension; Major depression; Stroke [facility]) (11/2017); and Thyroid disease. Mr. Annamaria Burgos  has no past surgical history on file. Social History/Living Environment:  
Home Environment: Apartment # Steps to Enter: 0 One/Two Story Residence: One story Living Alone: No 
Support Systems: Child(anuradha) Patient Expects to be Discharged to[de-identified] NRGIIMB Current DME Used/Available at Home: Shower chair Prior Level of Function/Work/Activity: 
I/mod I the ADLs drives short distances, cooks Number of Personal Factors/Comorbidities that affect the Plan of Care: Brief history (0):  LOW COMPLEXITY ASSESSMENT OF OCCUPATIONAL PERFORMANCE[de-identified]  
Activities of Daily Living:  
Basic ADLs (From Assessment) Complex ADLs (From Assessment) Feeding: Supervision Oral Facial Hygiene/Grooming: Supervision Bathing: Stand-by assistance Upper Body Dressing: Stand-by assistance Lower Body Dressing: Stand-by assistance, Contact guard assistance (donned B shoes fig. 4 technique) Toileting: Contact guard assistance, Minimum assistance (standing balance) Grooming/Bathing/Dressing Activities of Daily Living Cognitive Retraining Safety/Judgement: Awareness of environment;Decreased awareness of need for assistance; Fall prevention Bed/Mat Mobility Supine to Sit: Stand-by assistance Sit to Supine: Stand-by assistance Sit to Stand: Minimum assistance Scooting: Stand-by assistance Most Recent Physical Functioning:  
Gross Assessment: 
  
         
  
Posture: 
  
Balance: 
Sitting: Intact Standing: Pull to stand; With support Bed Mobility: 
Supine to Sit: Stand-by assistance Sit to Supine: Stand-by assistance Scooting: Stand-by assistance Wheelchair Mobility: 
  
Transfers: 
Sit to Stand: Minimum assistance Stand to Sit: Minimum assistance Patient Vitals for the past 6 hrs: 
 BP BP Patient Position SpO2 O2 Flow Rate (L/min) Pulse 09/14/18 0928 - - - 1 l/min -  
09/14/18 1210 128/83 At rest;Sitting 97 % - 71 Mental Status Neurologic State: Alert, Appropriate for age Orientation Level: Appropriate for age Cognition: Follows commands, Poor safety awareness Perception: Cues to maintain midline in standing Perseveration: No perseveration noted Safety/Judgement: Awareness of environment, Decreased awareness of need for assistance, Fall prevention Physical Skills Involved: 
1. Balance 2. Strength 3. Activity Tolerance 4. Pain (acute) Cognitive Skills Affected (resulting in the inability to perform in a timely and safe manner): 1. Perception Psychosocial Skills Affected: 1. Habits/Routines 2. Self-Awareness Number of elements that affect the Plan of Care: 5+:  HIGH COMPLEXITY CLINICAL DECISION MAKIN[address] [place] [address] AM-PAC 6 Clicks Daily Activity Inpatient Short Form How much help from another person does the patient currently need. .. Total A Lot A Little None 1. Putting on and taking off regular lower body clothing? [] 1   [] 2   [x] 3   [] 4  
2. Bathing (including washing, rinsing, drying)? [] 1   [] 2   [x] 3   [] 4  
3. Toileting, which includes using toilet, bedpan or urinal?   [] 1   [] 2   [x] 3   [] 4  
4. Putting on and taking off regular upper body clothing? [] 1   [] 2   [x] 3   [] 4  
5. Taking care of personal grooming such as brushing teeth? [] 1   [] 2   [] 3   [x] 4  
6. Eating meals? [] 1   [] 2   [] 3   [x] 4  
© , Trustees of [address] [place] [address], under license to [employer]. All rights reserved Score:  Initial: 19 Most Recent: X (Date: -- ) Interpretation of Tool:  Represents activities that are increasingly more difficult (i.e. Bed mobility, Transfers, Gait). Score 24 23 22-20 19-15 14-10 9-7 6 Modifier CH CI CJ CK CL CM CN   
 
? Self Care:  
  - CURRENT STATUS: CK - 40%-59% impaired, limited or restricted  - GOAL STATUS: CJ - 20%-39% impaired, limited or restricted  - D/C STATUS:  ---------------To be determined--------------- [place] / Product Type: Medicare /   
 
Medical Necessity:    
· Patient is expected to demonstrate progress in balance, coordination and functional technique to decrease assistance required with self care and functional mobiltiy and improve safety during self care and functional mobiltiy. Reason for Services/Other Comments: 
· Patient continues to require skilled intervention due to decreased self care and functional mobiltiy. Use of outcome tool(s) and clinical judgement create a POC that gives a: LOW COMPLEXITY  
 
 
 
TREATMENT:  
(In addition to Assessment/Re-Assessment sessions the following treatments were rendered) Pre-treatment Symptoms/Complaints:   
Pain: Initial:  
Pain Intensity 1: 0 (at rest)  Post Session:  7/10 in L side neck, back UE with activity,  
 
Assessment/Reassessment only, no treatment provided today Braces/Orthotics/Lines/Etc:  
· O2 Device: Nasal cannula Treatment/Session Assessment:   
· Response to Treatment:  Tolerated fair, lost his balance required assistance to steady · Interdisciplinary Collaboration:  
o Physical Therapist 
o Occupational Therapist 
o Registered Nurse 
o  · After treatment position/precautions:  
o Supine in bed 
o Bed alarm/tab alert on 
o Bed/Chair-wheels locked 
o Bed in low position 
o Call light within reach 
o RN notified 
o Family at bedside 
o Side rails x 3  
· Compliance with Program/Exercises: compliant most of the time. · Recommendations/Intent for next treatment session: \"Next visit will focus on advancements to more challenging activities and reduction in assistance provided\". Total Treatment Duration: OT Patient Time In/Time Out Time In: 5541 Time Out: 1430 Amanda Moura OT

## 2018-09-14 NOTE — PROGRESS NOTES
[facility] [facility] Face to Face Encounter Patients Name: Horacio Camacho    YOB: 1955 Ordering Physician: Dr. Madonna Torres Primary Diagnosis: Stroke-like episode ([facility] [address] Hyperglycemia Date of Face to Face:   9/14/2018 Face to Face Encounter findings are related to primary reason for home care:   yes. 1. I certify that the patient needs intermittent care as follows: skilled nursing care:  teaching/training of diabetic education 2. I certify that this patient is homebound, that is: 1) patient requires the use of a cane device, special transportation, or assistance of another to leave the home; or 2) patient's condition makes leaving the home medically contraindicated; and 3) patient has a normal inability to leave the home and leaving the home requires considerable and taxing effort. Patient may leave the home for infrequent and short duration for medical reasons, and occasional absences for non-medical reasons. Homebound status is due to the following functional limitations: Patient's ambulation limited secondary to severe pain and requires the use of an assistive device and the assistance of a caregiver for safe completion. Patient with strength and ROM deficits limiting ambulation endurance requiring the use of an assistive device and the assistance of a caregiver. Patient deemed temporarily homebound secondary to increased risk for infection when leaving home and going out into the community. 3. I certify that this patient is under my care and that I, or a nurse practitioner or  [id number], or clinical nurse specialist, or certified nurse midwife, working with me, had a Face-to-Face Encounter that meets the physician Face-to-Face Encounter requirements. The following are the clinical findings from the [address] [place] [address] encounter that support the need for skilled services and is a summary of the encounter:  See hospital chart Sharyle Addison, RN 
9/14/2018 THE FOLLOWING TO BE COMPLETED BY THE COMMUNITY PHYSICIAN: 
 
I concur with the findings described above from the F2F encounter that this patient is homebound and in need of a skilled service. Certifying Physician: _____________________________________ Printed Certifying Physician Name: _____________________________________ Date: _________________

## 2018-09-14 NOTE — CONSULTS
Consult    Patient: Gemma Luis MRN: [id number]     YOB: 1955  Age: 70 y.o. Sex: male      Subjective:      Gemma Luis is a 70 y.o. male who is being seen for left-sided numbness, sensory changes on the left side, severe pain, and weakness. These are his chief complaints. The patient had a small lacunar stroke in November 2017. At that time he had his hemoglobin A1c tested which was in the range of 6-7. He presented to the emergency department most recently with a blood glucose over 700 and hemoglobin A1c of 14. He has had 25 pounds of weight loss in the last few months. While driving he had sudden onset pain in the left upper limb. This progressed over a couple of days and included pain in the thoracic distribution on the left and pain in the left lower limb. This was associated with  Sensory changes and atypical myotome pattern. This was then followed by weakness and the patient is now developing atrophy. He is starting to have similar symptoms that are beginning on the right side. Onset of these symptoms is subacute. Duration has been a few days. Characterized as above. Past Medical History:   Diagnosis Date    Chronic kidney disease     kidney stone    Hypertension     Major depression     Stroke ([facility] [address] 11/2017    Left sided weakness/numbness, artherosclerotic thickening & calcified plaques    Thyroid disease      History reviewed. No pertinent surgical history.    Family History   Problem Relation Age of Onset    Heart Disease Mother     Dementia Father      Social History   Substance Use Topics    Smoking status: Never Smoker    Smokeless tobacco: Current User     Types: Chew    Alcohol use No      Comment: nightly, 1-2 bottles      Current Facility-Administered Medications   Medication Dose Route Frequency Provider Last Rate Last Dose    aspirin delayed-release tablet 81 mg  81 mg Oral DAILY Deonna Dickey MD   81 mg at 09/14/18 0220    atorvastatin (LIPITOR) tablet 40 mg  40 mg Oral QHS Cesar Marquez MD   40 mg at 09/14/18 0552    escitalopram oxalate (LEXAPRO) tablet 10 mg  10 mg Oral DAILY Cesar Marquez MD   10 mg at 09/14/18 6502    levothyroxine (SYNTHROID) tablet 75 mcg  75 mcg Oral ACB Cesar Marquez MD   75 mcg at 09/14/18 0928    insulin lispro (HUMALOG) injection 5 Units  5 Units SubCUTAneous Sasha Izquierdo MD        baclofen (LIORESAL) tablet 10 mg  10 mg Oral TID Cesar Marquez MD        tuberculin injection 5 Units  5 Units IntraDERMal ONCE Cesar Marquez MD   5 Units at 09/13/18 2350    ondansetron (ZOFRAN) injection 4 mg  4 mg IntraVENous Q6H PRN Cesar Marquez MD        heparin (porcine) injection 5,000 Units  5,000 Units SubCUTAneous Q8H Cesar Marquez MD   5,000 Units at 09/14/18 1356    acetaminophen (TYLENOL) tablet 650 mg  650 mg Oral Q6H PRN Cesar Marquez MD        insulin glargine (LANTUS) injection 25 Units  25 Units SubCUTAneous QHS Cesar Marquez MD   25 Units at 09/13/18 2350    insulin lispro (HUMALOG) injection   SubCUTAneous AC&HS Cesar Marquez MD   6 Units at 09/14/18 1241        No Known Allergies    Review of Systems:  CONSTITUTIONAL: positive weight loss, but negative for fever, chills, positive for weakness or fatigue. HEENT: Eyes: No visual loss, blurred vision, double vision or yellow sclerae. Ears, Nose, Throat: No hearing loss, sneezing, congestion, runny nose or sore throat. SKIN: No rash or itching. CARDIOVASCULAR: positive chest pain, positive chest pressure and chest discomfort. No palpitations or edema. RESPIRATORY: No shortness of breath, cough or sputum. GASTROINTESTINAL: No anorexia, nausea, vomiting or diarrhea. No abdominal pain or blood. GENITOURINARY: no burning with urination. NEUROLOGICAL: No headache, dizziness, syncope, ataxia. Otherwise see above  MUSCULOSKELETAL: No muscle, back pain, joint pain or stiffness.   HEMATOLOGIC: No anemia, bleeding or bruising. LYMPHATICS: No enlarged nodes. No history of splenectomy. PSYCHIATRIC: No history of depression or anxiety. ENDOCRINOLOGIC: No reports of sweating, cold or heat intolerance. No polyuria or polydipsia. ALLERGIES: No history of asthma, hives, eczema or rhinitis. Objective:     Vitals:    09/14/18 0749 09/14/18 0754 09/14/18 0900 09/14/18 1210   BP: 98/62   128/83   Pulse: 60   71   Resp: 16   20   Temp: 97.3 °F (36.3 °C)   95.9 °F (35.5 °C)   SpO2: 99% 96%  97%   Weight:   220 lb (99.8 kg)    Height:   5' 9\" (1.753 m)         Physical Exam:  General - Well developed, well nourished, in no apparent distress. Pleasant and conversent. HEENT - Normocephalic, atraumatic. Conjunctiva are clear. Neurological examination - Comprehension, attention , memory and reasoning are intact. Language and speech are normal. Visual fields are full to confrontation. Extraocular motility is normal. Face is symmetric. Hearing is intact to finger rustle bilaterally. Motor examination - There is normal muscle tone and bulk; However, there is relative atrophy of the left triceps and left quadricep. Ashley [employer] reduced on the right side. Cerebellar examination is normal.  The patient outlines his sensory abnormalities which are all in cervical, thoracic, and lumbar myotomes. Lab Results   Component Value Date/Time    Cholesterol, total 123 09/14/2018 06:41 AM    HDL Cholesterol 40 09/14/2018 06:41 AM    LDL, calculated 64 09/14/2018 06:41 AM    VLDL, calculated 19 09/14/2018 06:41 AM    Triglyceride 95 09/14/2018 06:41 AM    CHOL/HDL Ratio 3.1 09/14/2018 06:41 AM        Lab Results   Component Value Date/Time    Hemoglobin A1c 14.3 09/14/2018 06:41 AM        CT Results (most recent): Personally Reviewed     Results from [place] encounter on 09/13/18   CT HEAD WO CONT   Narrative CT HEAD WITHOUT CONTRAST, 9/13/2018     History: Left arm pain.      Comparison: CT head without contrast 11/27/2017     Technique:   5 mm axial scans from the skull base to the vertex. All CT scans  performed at this facility use one or all of the following: Automated exposure  control, adjustment of the mA and/or kVp according to patient's size, iterative  reconstruction. Findings:  No evidence of intracranial hemorrhage is seen. No abnormal  extra-axial fluid collections are seen. Moderate cortical and positional  changes are seen which appear advanced for the patient's age. No evidence for  acute hydrocephalus is seen. No evidence of midline shift or herniation is  seen. No abnormal edema pattern is seen in a vascular distribution to suggest  large artery infarction. Evaluation with bone windows shows no acute osseous changes. The visualized  sinuses, middle ears, and mastoid air cells are well aerated. Impression IMPRESSION:    1. No acute intracranial process evident by noncontrast CT study of the head. Results for orders placed or performed during the hospital encounter of 09/13/18   EKG, 12 LEAD, INITIAL   Result Value Ref Range    Ventricular Rate 88 BPM    Atrial Rate 88 BPM    P-R Interval 192 ms    QRS Duration 94 ms    Q-T Interval 362 ms    QTC Calculation (Bezet) 438 ms    Calculated P Axis 59 degrees    Calculated R Axis 16 degrees    Calculated T Axis 48 degrees    Diagnosis       !! AGE AND GENDER SPECIFIC ECG ANALYSIS !! Normal sinus rhythm  Low voltage QRS  Borderline ECG  When compared with ECG of 27-NOV-2017 18:05,  T wave amplitude has decreased in Anterior leads  Confirmed by ST NILS SHARMA MD (), LAKESHA CHAPIN ([id number]) on 9/13/2018 9:52:18 PM          MRI Results (most recent): Personally Reviewed    Results from [place] encounter on 09/13/18   MRI BRAIN WO CONT   Narrative MRI BRAIN WITHOUT CONTRAST 9/14/2018    HISTORY: Pain in left arm. Frozen shoulder. Recent stroke earlier this year.     TECHNIQUE: Sagittal and axial T1-weighted, axial T2-weighted, axial and coronal  FLAIR, axial T2-weighted gradient-echo, axial diffusion weighted images with ADC  maps of the brain. COMPARISON: 2017    FINDINGS: There is no acute infarction, acute intracranial hemorrhage,  intra-axial mass, hydrocephalus, abnormal extra-axial fluid collection. Moderate cerebral volume loss is present. On the T2 weighted and FLAIR  sequences, there are a few scattered hyperintense foci within the supratentorial  white matter. There has been an interval small infarct in the right thalamus and  there is an old small infarct in the posterior medial left thalamus. Impression IMPRESSION:    1. Moderate cerebral volume loss. 2. White matter findings compatible with chronic small vessel ischemic disease. 3. Subacute or old right thalamic infarct that has developed since the 2017 MRI. Most recent Echo  Results for orders placed or performed during the hospital encounter of 18   2D ECHO COMPLETE ADULT ([facility]E) [address]  [address]  [phone]    Transthoracic Echocardiogram  2D, M-mode, Doppler, and Color Doppler    Patient: Giovanna Khan  MR #: [id number]  : 1955  Age: 70 years  Gender: Male  Study date: 14-Sep-2018  Account #: [de-identified]  Height: 69 in  Weight: 224.6 lb  BSA: 2.17 mï¾²  Status:Routine  Location: 359  BP: 89/ 46    Allergies: NO KNOWN ALLERGIES    Sonographer:  Lianne Kellogg RDCS  Group:  [facility]  Referring Physician:  Nory Hyman MD  Reading Physician:  Reagan Conklin. Deja Mejia MD [facility] [place]    INDICATIONS: TIA with transient neurological disturbance. PROCEDURE: This was a routine study. A transthoracic echocardiogram was  performed. The study included complete 2D imaging, M-mode, complete spectral  Doppler, and color Doppler. Intravenous contrast (Definity) was administered.   Image quality was adequate. LEFT VENTRICLE: Size was normal. Systolic function was normal. Ejection  fraction was estimated in the range of 55 % to 60 %. There were no regional  wall motion abnormalities. Wall thickness was mildly increased. Avg. E/e'=  11.6. Left ventricular diastolic function parameters were normal.    RIGHT VENTRICLE: The size was normal. Systolic function was normal. The  tricuspid jet envelope definition was inadequate for estimation of RV   systolic  pressure. LEFT ATRIUM: Size was normal.    ATRIAL SEPTUM: Contrast injection was performed. There was no right-to-left  shunt, with provocative maneuvers to increase right atrial pressure. RIGHT ATRIUM: Size was normal.    SYSTEMIC VEINS: IVC: The inferior vena cava was normal in size and course. AORTIC VALVE: The valve was trileaflet. Leaflets exhibited mild sclerosis. There was no evidence for stenosis. There was no insufficiency. MITRAL VALVE: There was mild calcification of the anterior leaflet. There was  no evidence for stenosis. There was no regurgitation. TRICUSPID VALVE: The valve structure was normal. There was no evidence for  stenosis. There was trace regurgitation. PULMONIC VALVE: Not well visualized. There was no evidence for stenosis. There  was trace insufficiency. PERICARDIUM: There was no pericardial effusion. AORTA: The root exhibited dilatation. (4.3cm) There was mild dilatation of   the  ascending aorta. (4.0 cm)    SUMMARY:    -  Left ventricle: Systolic function was normal. Ejection fraction was  estimated in the range of 55 % to 60 %. There were no regional wall motion  abnormalities. Wall thickness was mildly increased. Avg. E/e'= 11.6. Left  ventricular diastolic function parameters were normal.    -  Atrial septum: Contrast injection was performed. There was no   right-to-left  shunt, with provocative maneuvers to increase right atrial pressure. -  Aorta, systemic arteries:  There was mild dilatation of the ascending   aorta. (4.0 cm)    SYSTEM MEASUREMENT TABLES    2D mode  AoR Diam (2D): 4.3 cm  LA Dimension (2D): 3.5 cm  Left Atrium Systolic Volume Index; Method of Disks, Biplane; 2D mode;: 28.6  ml/m2  IVS/LVPW (2D): 1  IVSd (2D): 1.1 cm  LVIDd (2D): 4.2 cm  LVIDs (2D): 2.1 cm  LVOT Area (2D): 4.9 cm2  LVPWd (2D): 1.1 cm  RVIDd (2D): 3.5 cm    Tissue Doppler Imaging  LV Peak Early Monahan Tissue Carlin; Lateral MA (TDI): 9.1 cm/s  LV Peak Early Monahan Tissue Carlin; Medial MA (TDI): 6 cm/s    Unspecified Scan Mode  Peak Grad; Mean; Antegrade Flow: 6 mm[Hg]  Vmax; Antegrade Flow: 123 cm/s  LVOT Diam: 2.5 cm  MV Peak Carlin/LV Peak Tissue Carlin E-Wave; Lateral MA: 9.2  MV Peak Carlin/LV Peak Tissue Carlin E-Wave; Medial MA: 14    Prepared and signed by    Sarah Haji. 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 Unfortunately, this will likely get worse before it gets better and in 85% of cases it involves the contralateral side of the body which is already beginning. There is a fair evidence that intravenous immunoglobulin helps with recovery from this condition. We will start this medication. It should be known that IVIG has a delayed effect, typically about 10 days. The patient will likely continue to get worse before he starts getting better. Plan:     Intravenous immunoglobulin 2 grams/kg divided over 5 days (0.4 mg/kg/day x 5 days). Pretreat with antihistamine and Tylenol 30 minutes prior to infusion. Monitor kidney function through treatment. Signed By: Tiffany Rodriguez DO     September 14, 2018      I spent 70 minutes in this patient's care, over half that time was face-to-face with the patient via computer interface answering questions and counseling him on his neurological condition.

## 2018-09-14 NOTE — PROGRESS NOTES
OT/PT note: orders received chart reviewed, attempted to see patient. He was off the floor for testing. Will re attempt at another time.  Arlee Riedel OTR/L

## 2018-09-15 LAB
GLUCOSE BLD STRIP.AUTO-MCNC: 111 MG/DL (ref 65–100)
GLUCOSE BLD STRIP.AUTO-MCNC: 133 MG/DL (ref 65–100)
GLUCOSE BLD STRIP.AUTO-MCNC: 150 MG/DL (ref 65–100)
GLUCOSE BLD STRIP.AUTO-MCNC: 232 MG/DL (ref 65–100)
MM INDURATION POC: NORMAL MM (ref 0–5)
PPD POC: NORMAL NEGATIVE

## 2018-09-15 PROCEDURE — 74011250637 HC RX REV CODE- 250/637: Performed by: PSYCHIATRY & NEUROLOGY

## 2018-09-15 PROCEDURE — 74011250637 HC RX REV CODE- 250/637: Performed by: FAMILY MEDICINE

## 2018-09-15 PROCEDURE — 65270000029 HC RM PRIVATE

## 2018-09-15 PROCEDURE — 74011250637 HC RX REV CODE- 250/637: Performed by: INTERNAL MEDICINE

## 2018-09-15 PROCEDURE — 97530 THERAPEUTIC ACTIVITIES: CPT

## 2018-09-15 PROCEDURE — 82962 GLUCOSE BLOOD TEST: CPT

## 2018-09-15 PROCEDURE — 94760 N-INVAS EAR/PLS OXIMETRY 1: CPT

## 2018-09-15 PROCEDURE — 74011250636 HC RX REV CODE- 250/636: Performed by: INTERNAL MEDICINE

## 2018-09-15 PROCEDURE — 74011250636 HC RX REV CODE- 250/636: Performed by: PSYCHIATRY & NEUROLOGY

## 2018-09-15 PROCEDURE — 97110 THERAPEUTIC EXERCISES: CPT

## 2018-09-15 PROCEDURE — 74011636637 HC RX REV CODE- 636/637: Performed by: INTERNAL MEDICINE

## 2018-09-15 RX ORDER — ZOLPIDEM TARTRATE 5 MG/1
5 TABLET ORAL
Status: DISCONTINUED | OUTPATIENT
Start: 2018-09-15 | End: 2018-09-18 | Stop reason: HOSPADM

## 2018-09-15 RX ADMIN — ACETAMINOPHEN 500 MG: 500 TABLET, FILM COATED ORAL at 08:06

## 2018-09-15 RX ADMIN — HEPARIN SODIUM 5000 UNITS: 5000 INJECTION INTRAVENOUS; SUBCUTANEOUS at 14:06

## 2018-09-15 RX ADMIN — BACLOFEN 10 MG: 10 TABLET ORAL at 22:56

## 2018-09-15 RX ADMIN — INSULIN LISPRO 5 UNITS: 100 INJECTION, SOLUTION INTRAVENOUS; SUBCUTANEOUS at 11:51

## 2018-09-15 RX ADMIN — INSULIN GLARGINE 25 UNITS: 100 INJECTION, SOLUTION SUBCUTANEOUS at 22:00

## 2018-09-15 RX ADMIN — INSULIN LISPRO 4 UNITS: 100 INJECTION, SOLUTION INTRAVENOUS; SUBCUTANEOUS at 11:52

## 2018-09-15 RX ADMIN — INSULIN LISPRO 5 UNITS: 100 INJECTION, SOLUTION INTRAVENOUS; SUBCUTANEOUS at 08:02

## 2018-09-15 RX ADMIN — BACLOFEN 10 MG: 10 TABLET ORAL at 08:06

## 2018-09-15 RX ADMIN — ASPIRIN 81 MG: 81 TABLET, COATED ORAL at 08:06

## 2018-09-15 RX ADMIN — ATORVASTATIN CALCIUM 40 MG: 40 TABLET, FILM COATED ORAL at 22:56

## 2018-09-15 RX ADMIN — HEPARIN SODIUM 5000 UNITS: 5000 INJECTION INTRAVENOUS; SUBCUTANEOUS at 06:39

## 2018-09-15 RX ADMIN — ZOLPIDEM TARTRATE 5 MG: 5 TABLET, COATED ORAL at 23:00

## 2018-09-15 RX ADMIN — INSULIN LISPRO 5 UNITS: 100 INJECTION, SOLUTION INTRAVENOUS; SUBCUTANEOUS at 16:59

## 2018-09-15 RX ADMIN — ESCITALOPRAM OXALATE 10 MG: 10 TABLET ORAL at 08:06

## 2018-09-15 RX ADMIN — LEVOTHYROXINE SODIUM 75 MCG: 75 TABLET ORAL at 08:06

## 2018-09-15 RX ADMIN — BACLOFEN 10 MG: 10 TABLET ORAL at 16:58

## 2018-09-15 RX ADMIN — HEPARIN SODIUM 5000 UNITS: 5000 INJECTION INTRAVENOUS; SUBCUTANEOUS at 22:56

## 2018-09-15 RX ADMIN — DIPHENHYDRAMINE HYDROCHLORIDE 12.5 MG: 50 INJECTION, SOLUTION INTRAMUSCULAR; INTRAVENOUS at 08:05

## 2018-09-15 RX ADMIN — IMMUNE GLOBULIN (HUMAN) 30 G: 10 INJECTION INTRAVENOUS; SUBCUTANEOUS at 08:44

## 2018-09-15 RX ADMIN — GABAPENTIN 300 MG: 300 CAPSULE ORAL at 08:06

## 2018-09-15 NOTE — PROGRESS NOTES
Hospitalist Progress Note Admit Date:  2018  3:53 PM  
Name:  Karen Kelley Age:  70 y.o. 
:  1955 MRN:  [id number] PCP:  Gemma Giraldo MD 
Treatment Team: Attending Provider: Evelyn Green MD; Consulting Provider: Tia Johnston DO Subjective:  
Slept well, pain and ROM is a little better to LUE but symptoms still present, no n/v/d. Appetite is good, he's ready for breakfast. Ambulating some in room. ROS otherwise negative. Objective:  
Patient Vitals for the past 24 hrs: 
 Temp Pulse Resp BP SpO2  
09/15/18 0508 96.8 °F (36 °C) 90 20 99/64 98 % 09/15/18 0036 97.9 °F (36.6 °C) 78 20 107/70 94 % 18 2109 98 °F (36.7 °C) - 12 - 96 % 18 - - - - 94 % 18 1930 98 °F (36.7 °C) 76 12 115/78 97 % 18 1756 97.8 °F (36.6 °C) 74 19 141/90 96 % 18 1210 95.9 °F (35.5 °C) 71 20 128/83 97 % 18 0754 - - - - 96 % 18 0749 97.3 °F (36.3 °C) 60 16 98/62 99 % Oxygen Therapy O2 Sat (%): 98 % (09/15/18 0508) Pulse via Oximetry: 70 beats per minute (18) O2 Device: CPAP mask (Home CPAP set up. NAD.) (18) O2 Flow Rate (L/min): 0 l/min (18) No intake or output data in the 24 hours ending 09/15/18 0731 General:    Well nourished. Alert. CV:   RRR. No murmur, rub, or gallop. Lungs:   CTAB. No wheezing, rhonchi, or rales. Abdomen:   Soft, nontender, nondistended. Extremities: Warm and dry. No cyanosis or edema. Skin:     No rashes or jaundice. Neuro:  No gross focal deficits Data Review: 
I have reviewed all labs, meds, telemetry events, and studies from the last 24 hours: 
 
Recent Results (from the past 24 hour(s)) GLUCOSE, POC Collection Time: 18 11:59 AM  
Result Value Ref Range Glucose (POC) 258 (H) 65 - 100 mg/dL GLUCOSE, POC Collection Time: 18  5:52 PM  
Result Value Ref Range Glucose (POC) 269 (H) 65 - 100 mg/dL GLUCOSE, POC  
 Collection Time: 18  9:34 PM  
Result Value Ref Range Glucose (POC) 216 (H) 65 - 100 mg/dL PLEASE READ & DOCUMENT PPD TEST IN 24 HRS Collection Time: 18 11:00 PM  
Result Value Ref Range PPD negative Negative  
 mm Induration 0 mm All Micro Results None Results for orders placed or performed during the hospital encounter of 18  
2D ECHO COMPLETE ADULT (TTE) W OR WO CONTR Narrative [place] [facility] [address] [place], [address] [facility] 
[phone] Transthoracic Echocardiogram 
2D, M-mode, Doppler, and Color Doppler Patient: Gemma Thrasher 
MR #: [id number] : 1955 Age: 70 years Gender: Male Study date: 14-Sep-2018 Account #: [de-identified] Height: 69 in 
Weight: 224.6 lb 
BSA: 2.17 mï¾² Status:Routine Location: [facility] BP: 89/ 46 Allergies: NO KNOWN ALLERGIES Sonographer:  Angel Chairez RD Group:  [facility] Referring Physician:  Trina Ordonez MD 
Reading Physician:  Yecenia Berrios. Dylon Parkinson MD [facility] INDICATIONS: TIA with transient neurological disturbance. PROCEDURE: This was a routine study. A transthoracic echocardiogram was 
performed. The study included complete 2D imaging, M-mode, complete spectral 
Doppler, and color Doppler. Intravenous contrast (Definity) was administered. Image quality was adequate. LEFT VENTRICLE: Size was normal. Systolic function was normal. Ejection 
fraction was estimated in the range of 55 % to 60 %. There were no regional 
wall motion abnormalities. Wall thickness was mildly increased. Avg. E/e'= 
11.6. Left ventricular diastolic function parameters were normal. 
 
RIGHT VENTRICLE: The size was normal. Systolic function was normal. The 
tricuspid jet envelope definition was inadequate for estimation of RV  
systolic 
pressure. LEFT ATRIUM: Size was normal. 
 
ATRIAL SEPTUM: Contrast injection was performed. There was no right-to-left shunt, with provocative maneuvers to increase right atrial pressure. RIGHT ATRIUM: Size was normal. 
 
SYSTEMIC VEINS: IVC: The inferior vena cava was normal in size and course. AORTIC VALVE: The valve was trileaflet. Leaflets exhibited mild sclerosis. There was no evidence for stenosis. There was no insufficiency. MITRAL VALVE: There was mild calcification of the anterior leaflet. There was 
no evidence for stenosis. There was no regurgitation. TRICUSPID VALVE: The valve structure was normal. There was no evidence for 
stenosis. There was trace regurgitation. PULMONIC VALVE: Not well visualized. There was no evidence for stenosis. There 
was trace insufficiency. PERICARDIUM: There was no pericardial effusion. AORTA: The root exhibited dilatation. (4.3cm) There was mild dilatation of  
the 
ascending aorta. (4.0 cm) SUMMARY: 
 
-  Left ventricle: Systolic function was normal. Ejection fraction was 
estimated in the range of 55 % to 60 %. There were no regional wall motion 
abnormalities. Wall thickness was mildly increased. Avg. E/e'= 11.6. Left 
ventricular diastolic function parameters were normal. 
 
-  Atrial septum: Contrast injection was performed. There was no  
right-to-left 
shunt, with provocative maneuvers to increase right atrial pressure. -  Aorta, systemic arteries: There was mild dilatation of the ascending  
aorta. (4.0 cm) SYSTEM MEASUREMENT TABLES 
 
2D mode AoR Diam (2D): 4.3 cm 
LA Dimension (2D): 3.5 cm Left Atrium Systolic Volume Index; Method of Disks, Biplane; 2D mode;: 28.6 
ml/m2 IVS/LVPW (2D): 1 IVSd (2D): 1.1 cm 
LVIDd (2D): 4.2 cm LVIDs (2D): 2.1 cm 
LVOT Area (2D): 4.9 cm2 LVPWd (2D): 1.1 cm RVIDd (2D): 3.5 cm Tissue Doppler Imaging LV Peak Early Monahan Tissue Carlin; Lateral MA (TDI): 9.1 cm/s LV Peak Early Monahan Tissue Carlin; Medial MA (TDI): 6 cm/s Unspecified Scan Mode Peak Grad; Mean; Antegrade Flow: 6 mm[Hg] Vmax; Antegrade Flow: 123 cm/s LVOT Diam: 2.5 cm 
MV Peak Carlin/LV Peak Tissue Carlin E-Wave; Lateral MA: 9.2 MV Peak Carlin/LV Peak Tissue Carlin E-Wave; Medial MA: 14 Prepared and signed by 
 
Lizbeth Moura. Lolis Marie MD [facility] [place] Signed 14-Sep-2018 14:37:09 Current Meds: 
Current Facility-Administered Medications Medication Dose Route Frequency  aspirin delayed-release tablet 81 mg  81 mg Oral DAILY  atorvastatin (LIPITOR) tablet 40 mg  40 mg Oral QHS  escitalopram oxalate (LEXAPRO) tablet 10 mg  10 mg Oral DAILY  levothyroxine (SYNTHROID) tablet 75 mcg  75 mcg Oral ACB  insulin lispro (HUMALOG) injection 5 Units  5 Units SubCUTAneous TIDAC  
 baclofen (LIORESAL) tablet 10 mg  10 mg Oral TID  immune globulin 10% (GAMUNEX-C) infusion 30 g  30 g IntraVENous DAILY  diphenhydrAMINE (BENADRYL) injection 12.5 mg  12.5 mg IntraVENous DAILY  acetaminophen (TYLENOL) tablet 500 mg  500 mg Oral DAILY  gabapentin (NEURONTIN) capsule 300 mg  300 mg Oral DAILY  ondansetron (ZOFRAN) injection 4 mg  4 mg IntraVENous Q6H PRN  
 heparin (porcine) injection 5,000 Units  5,000 Units SubCUTAneous Q8H  
 insulin glargine (LANTUS) injection 25 Units  25 Units SubCUTAneous QHS  insulin lispro (HUMALOG) injection   SubCUTAneous AC&HS Other Studies (last 24 hours): 
Xr Shoulder Lt Ap/lat Min 2 V Result Date: 9/14/2018 THREE-VIEW LEFT SHOULDER: CLINICAL HISTORY:  Moderate left shoulder pain and limited range of motion for one month. COMPARISON:  Portable chest of September 13, 2018. FINDINGS:  No definite fracture, malalignment, or fish bone destruction is evident. No persistent radiopaque foreign body is seen. There is severe acromioclavicular osteoarthritis with prominent inferior spurring. There are overlying radiopaque support devices. IMPRESSION:  Acromioclavicular osteoarthritis with inferior spurring may predispose to impingement.   Followup MRI of the shoulder would be useful for further evaluation of possible associated rotator cuff pathology, if clinically indicated. Mri Brain Wo Cont Result Date: 9/14/2018 MRI BRAIN WITHOUT CONTRAST 9/14/2018 HISTORY: Pain in left arm. Frozen shoulder. Recent stroke earlier this year. TECHNIQUE: Sagittal and axial T1-weighted, axial T2-weighted, axial and coronal FLAIR, axial T2-weighted gradient-echo, axial diffusion weighted images with ADC maps of the brain. COMPARISON: November 19, 2017 FINDINGS: There is no acute infarction, acute intracranial hemorrhage, intra-axial mass, hydrocephalus, abnormal extra-axial fluid collection. Moderate cerebral volume loss is present. On the T2 weighted and FLAIR sequences, there are a few scattered hyperintense foci within the supratentorial white matter. There has been an interval small infarct in the right thalamus and there is an old small infarct in the posterior medial left thalamus. IMPRESSION: 1. Moderate cerebral volume loss. 2. White matter findings compatible with chronic small vessel ischemic disease. 3. Subacute or old right thalamic infarct that has developed since the November 2017 MRI. Assessment and Plan:  
 
Hospital Problems as of 9/15/2018  Never Reviewed Codes Class Noted - Resolved POA Major depression ICD-10-CM: F32.9 ICD-9-CM: 296.20  9/13/2018 - Present Yes Hyperglycemia ICD-10-CM: R73.9 ICD-9-CM: 790.29  9/13/2018 - Present Unknown Stroke-like episode ([facility] [address] ICD-10-CM: I63.9 ICD-9-CM: 434.91  9/13/2018 - Present Unknown Thalamic infarct, acute ([facility] [address] ICD-10-CM: I63.9 ICD-9-CM: 434.91  11/21/2017 - Present Hyperlipidemia ICD-10-CM: E78.5 ICD-9-CM: 272.4  11/19/2017 - Present Yes * (Principal)TIA (transient ischemic attack) ICD-10-CM: G45.9 ICD-9-CM: 435.9  11/18/2017 - Present Yes Hypertension ICD-10-CM: I10 
ICD-9-CM: 401.9  11/18/2017 - Present Yes Acquired hypothyroidism (Chronic) ICD-10-CM: E03.9 ICD-9-CM: 244.9  11/18/2017 - Present Yes Plan: # Uncontrolled new onset DM 
                      - A1C 14, started on basal/bolus/sliding scale insulin 
                      - Statin, DM educator - Needs close outpt follow up and therapeutic lifestyle changes 
  
# Diabetic amyotrophy 
 - Neuro consultation appreciated - IVIg x5 days - Con't therapies 
  
# Left shoulder pain 
                      - Xray reviewed; likely impingement but will monitor response to IVIg now that diabetic amyotrophy is suspected - ? MRI outpt; con't therapies 
  
# SHANTELL 
                      - Resolved 
  
# HTN 
                      - BPs still slightly low, pt asymptomatic, but will hold meds again today. 
   
# Hyponatremia                       - Resolved with sugar control 
   
# PPx                       - GI: not indicated 
                      - DVT: heparin 
   
# FEN/FIT 
                      - IVFs; replete lytes prn; DM diet 
                      - No grewal; PIV; dc tele 
   
# Dispo 
                      - Blood sugar control, MRI non-acute. IVIg x5 days for diabetic amyotrophy. DC planning/Dispo:   
Diet:  DIET DIABETIC CONSISTENT CARB Signed: 
Ruth Hernandez MD

## 2018-09-15 NOTE — PROGRESS NOTES
Problem: Mobility Impaired (Adult and Pediatric) Goal: *Acute Goals and Plan of Care (Insert Text) LTG: 
(1.)Mr. Zion Saldivar will move from supine to sit and sit to supine  in bed with MODIFIED INDEPENDENCE within 7 treatment day(s). Met 9/15/18 
(2.)Mr. Zion Saldivar will transfer from bed to chair and chair to bed with STAND BY ASSIST using the least restrictive device within 7 treatment day(s). (3.)Mr. Zion Saldivar will ambulate with STAND BY ASSIST for 200 feet with the least restrictive device within 7 treatment day(s). Met 9/15/18 NEW GOALS 
(4.)Mr. Zion Saldivar will ambulate independently x 650 feet with the least restrictive device within 5 treatment days. (5.)Mr. Zion Saldivar will be independent with home exercise program to improve balance and LE strength within 5 treatment days. ________________________________________________________________________________________________ PHYSICAL THERAPY: Daily Note, PM 9/15/2018 INPATIENT: Hospital Day: 3 [place] / Product Type: Medicare /  
  
NAME/AGE/GENDER: Des Wilson is a 70 y.o. male PRIMARY DIAGNOSIS: Stroke-like episode ([facility] [address] Hyperglycemia TIA (transient ischemic attack) TIA (transient ischemic attack) ICD-10: Treatment Diagnosis:  
 · Other abnormalities of gait and mobility (R26.89) · Ataxic gait (R26.0) Precaution/Allergies: 
Review of patient's allergies indicates no known allergies. ASSESSMENT:  
 
Mr. Zion Salidvar presents with limited independence with bed mobility, transfers and gait. He reports balance and gait stability are worse than normal. Reports numbness and tingling in L LE. Not very agreeable to the thought of using a cane for increased gait stability. Will follow to increase independence and safety with mobility. HHPT recommended. Patient worked well this afternoon. Reports L side feeling a little better but continued numbness. Stability appears better today. Occasional scissoring/adduction on L with gait but no LOB. Patient did walk ~100' with a cane on R side. Less adduction of L LE noted but patient did not feel the cane was helpful. Added sidelying LE exercises to address hip strength for stability. Will continue therapy but patient will likely benefit from outpatient therapies more than HH-too high level. This section established at most recent assessment PROBLEM LIST (Impairments causing functional limitations): 1. Decreased Strength 2. Decreased Transfer Abilities 3. Decreased Ambulation Ability/Technique 4. Decreased Balance INTERVENTIONS PLANNED: (Benefits and precautions of physical therapy have been discussed with the patient.) 1. Bed Mobility 2. Gait Training 3. Transfer Training TREATMENT PLAN: Frequency/Duration: daily for duration of hospital stay Rehabilitation Potential For Stated Goals: Good RECOMMENDED REHABILITATION/EQUIPMENT: (at time of discharge pending progress): Due to the probability of continued deficits (see above) this patient will likely need continued skilled physical therapy after discharge. Equipment:  
? None at this time HISTORY:  
History of Present Injury/Illness (Reason for Referral): Mr. 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 His daughter is at the bedside and says patient was complaining to her uncle that the symptoms have been present for over 1 week. Apparently the patient had previously said he didn't see the point in coming in sooner because he didn't think anything was done for him during his last stroke. He called his PCP today who instructed him to come to the ED. He had started lifting weights and thought he had injured his arm that way, however his weakness and numbness persisted. He also c/o some left sided weakness.  No facial droop or slurred speech was noted. Initial head CT here was unremarkable. His labs show hyponatremia, hyperglycemia and SHANTELL. A1C 11/2017 was 6.3. 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 He says he was compliant with PT after that. ROS otherwise negative. Past Medical History/Comorbidities:  
Mr. Bisi Garner  has a past medical history of Chronic kidney disease; Hypertension; Major depression; Stroke [facility]) (11/2017); and Thyroid disease. Mr. Bisi Garner  has no past surgical history on file. Social History/Living Environment:  
Home Environment: Apartment # Steps to Enter: 0 One/Two Story Residence: One story Living Alone: No 
Support Systems: Child(anuradha) Patient Expects to be Discharged to[de-identified] PARAG Current DME Used/Available at Home: Shower chair Prior Level of Function/Work/Activity: 
Ambulatory without an assistive device. Some sporadic unsteadiness but still able to drive short distances from the house and independent with ADL's. Number of Personal Factors/Comorbidities that affect the Plan of Care: 0: LOW COMPLEXITY EXAMINATION:  
Most Recent Physical Functioning:  
Gross Assessment: 
AROM: Generally decreased, functional (R better than L) Strength: Generally decreased, functional (R side stronger than L) Sensation: Impaired (L side of body-more lateral distribution) Posture: 
  
Balance: 
  Bed Mobility: 
Rolling: Independent Supine to Sit: Independent Sit to Supine: Independent Scooting: Independent Wheelchair Mobility: 
  
Transfers: 
Sit to Stand: Stand-by assistance Stand to Sit: Stand-by assistance Gait: 
  
Speed/Ewa: Pace decreased (<100 feet/min) Gait Abnormalities: Scissoring (intermittent adduction on L) Distance (ft): 750 Feet (ft) Assistive Device:  (cane for ~100'; otherwise no device) Ambulation - Level of Assistance: Stand-by assistance Interventions: Safety awareness training;Verbal cues Body Structures Involved: 1. Joints 2. Muscles Body Functions Affected: 1. Movement Related Activities and Participation Affected: 1. Mobility Number of elements that affect the Plan of Care: 4+: HIGH COMPLEXITY CLINICAL PRESENTATION:  
Presentation: Stable and uncomplicated: LOW COMPLEXITY CLINICAL DECISION MAKIN[address] [place] [address] AM-PAC 6 Clicks Basic Mobility Inpatient Short Form How much difficulty does the patient currently have. .. Unable A Lot A Little None 1. Turning over in bed (including adjusting bedclothes, sheets and blankets)? [] 1   [] 2   [x] 3   [] 4  
2. Sitting down on and standing up from a chair with arms ( e.g., wheelchair, bedside commode, etc.)   [] 1   [] 2   [x] 3   [] 4  
3. Moving from lying on back to sitting on the side of the bed? [] 1   [] 2   [x] 3   [] 4 How much help from another person does the patient currently need. .. Total A Lot A Little None 4. Moving to and from a bed to a chair (including a wheelchair)? [] 1   [] 2   [x] 3   [] 4  
5. Need to walk in hospital room? [] 1   [] 2   [x] 3   [] 4  
6. Climbing 3-5 steps with a railing? [] 1   [] 2   [x] 3   [] 4  
© , Trustees of [address] [place] [address], under license to [employer]. All rights reserved Score:  Initial: 18 Most Recent: X (Date: -- ) Interpretation of Tool:  Represents activities that are increasingly more difficult (i.e. Bed mobility, Transfers, Gait). Score 24 23 22-20 19-15 14-10 9-7 6 Modifier CH CI CJ CK CL CM CN   
 
? Mobility - Walking and Moving Around:  
  - CURRENT STATUS: CK - 40%-59% impaired, limited or restricted  - GOAL STATUS: CJ - 20%-39% impaired, limited or restricted  - D/C STATUS:  ---------------To be determined--------------- [place] / [place] / Product Type: Medicare /   
 
Medical Necessity: · Patient is expected to demonstrate progress in strength, balance and coordination to increase independence with gait, transfers and bed mobility. Reason for Services/Other Comments: 
· Patient continues to require skilled intervention due to limited functional independence. Use of outcome tool(s) and clinical judgement create a POC that gives a: Clear prediction of patient's progress: LOW COMPLEXITY  
  
 
 
 
TREATMENT:  
(In addition to Assessment/Re-Assessment sessions the following treatments were rendered) Pre-treatment Symptoms/Complaints:  Patient reports he feels a little better today. Pain: Initial: 4/10 - L shoulder Post Session:  4/10 Therapeutic Activity: (    25 minutes): Therapeutic activities including Bed transfers, Ambulation on level ground and LE exercises to improve mobility, strength, balance and coordination. Required minimal Safety awareness training;Verbal cues to promote dynamic balance in standing. Date: 
9/15/18 Date: 
 Date: Activity/Exercise Parameters Parameters Parameters Clams 8 B Hip abduction 10 B Braces/Orthotics/Lines/Etc:  
· none Treatment/Session Assessment:   
· Response to Treatment:  Patient participated well. Did not feel cane was helpful. Decreased tolerance for lying on L side but able to complete exercises. Can work in standing if needed. · Interdisciplinary Collaboration:  
o Physical Therapist 
o Registered Nurse · After treatment position/precautions:  
o Supine in bed 
o Bed/Chair-wheels locked 
o Bed in low position 
o Call light within reach 
o RN notified 
o Family at bedside · Compliance with Program/Exercises: compliant most of the time. · Recommendations/Intent for next treatment session: \"Next visit will focus on advancements to more challenging activities and reduction in assistance provided\". Total Treatment Duration: PT Patient Time In/Time Out Time In: 4226 Time Out: 7115 Rohan Cooper, PT

## 2018-09-15 NOTE — PROGRESS NOTES
PM Shift assessment - Patient is alert and oriented x4. No complaint of pain or discomfort at this time. Patient has numbness and weakness to left side extremities. Left sided facial droop noted. Patient continues neuro-checks every four hours. Respirations are even and unlabored. Currently resting in a low, locked bed with call light within reach.

## 2018-09-15 NOTE — PROGRESS NOTES
Pt resting in bed, watching TV, alert & oriented x4. Neuro checks done via doc flow sheet. Pt complained of left arm pain, Tylenol 500 mg po given as ordered. Bed low & locked, side rails x3 up with call light within reach, pt instructed to call for assistance as needed.

## 2018-09-15 NOTE — PROGRESS NOTES
Problem: Self Care Deficits Care Plan (Adult) Goal: *Acute Goals and Plan of Care (Insert Text) 1. Patient will perform grooming with supervision. 2. Patient will perform Upper body dressing with supervision 3. Patient will perform lower body dressing with SBA 4. Patient will perform upper and lower body bathing with supervision. 5. Patient will perform toilet transfers with SBA 6. Patient will perform shower transfer with CGA. 7. Patient will participate in 30 + minutes of ADL/ therapeutic exercise/therapeutic activity with min rest breaks to increase activity tolerance for self care. 8. Patient will perform ADL functional mobility in room with SBA Goals to be achieved in 7 days. OCCUPATIONAL THERAPY: Daily Note, Treatment Day: 1st and PM 9/15/2018 INPATIENT: Hospital Day: 3 [place] / Product Type: Medicare /  
  
NAME/AGE/GENDER: Farida Dietz is a 70 y.o. male PRIMARY DIAGNOSIS:  Stroke-like episode ([facility] [address] Hyperglycemia TIA (transient ischemic attack) TIA (transient ischemic attack) ICD-10: Treatment Diagnosis:  
 · Pain in Left Shoulder (M25.512) · Stiffness of Left Shoulder, Not elsewhere classified (M25.612) · Other lack of cordination (R27.8) · Difficulty in walking, Not elsewhere classified (R26.2) Precautions/Allergies: 
   Review of patient's allergies indicates no known allergies. ASSESSMENT:  
 
Mr. Annamaria Burgos presents with above diagnosis. Pt completed bed mobility and functional transfer with the assistance listed below. Pt completed the exercises below on B UE's. Pt left sitting in chair. Continue POC. This section established at most recent assessment PROBLEM LIST (Impairments causing functional limitations): 1. Decreased Strength 2. Decreased ADL/Functional Activities 3. Decreased Transfer Abilities 4. Decreased Ambulation Ability/Technique 5. Decreased Balance 6. Increased Pain 7. Increased Shortness of Breath INTERVENTIONS PLANNED: (Benefits and precautions of occupational therapy have been discussed with the patient.) 1. Activities of daily living training 2. Adaptive equipment training 3. Balance training 4. Clothing management 5. Donning&doffing training 6. Neuromuscular re-eduation 7. Therapeutic activity 8. Therapeutic exercise 9. Home safety and DME  
 
TREATMENT PLAN: Frequency/Duration: Follow patient 4 times to address above goals. Rehabilitation Potential For Stated Goals: Good RECOMMENDED REHABILITATION/EQUIPMENT: (at time of discharge pending progress): Due to the probability of continued deficits (see above) this patient will likely need continued skilled occupational therapy after discharge. Equipment:  
? None at this time OCCUPATIONAL PROFILE AND HISTORY:  
History of Present Injury/Illness (Reason for Referral): Decreased self care  And functional mobiltiy Past Medical History/Comorbidities:  
Mr. Shabnam Martinez  has a past medical history of Chronic kidney disease; Hypertension; Major depression; Stroke [facility]) (11/2017); and Thyroid disease. Mr. Shabnam Martinez  has no past surgical history on file. Social History/Living Environment:  
Home Environment: Apartment # Steps to Enter: 0 One/Two Story Residence: One story Living Alone: No 
Support Systems: Child(anuradha) Patient Expects to be Discharged to[de-identified] [id number] Current DME Used/Available at Home: Shower chair Prior Level of Function/Work/Activity: 
I/mod I the ADLs drives short distances, cooks Number of Personal Factors/Comorbidities that affect the Plan of Care: Brief history (0):  LOW COMPLEXITY ASSESSMENT OF OCCUPATIONAL PERFORMANCE[de-identified]  
Activities of Daily Living:  
Basic ADLs (From Assessment) Complex ADLs (From Assessment) Feeding: Supervision Oral Facial Hygiene/Grooming: Supervision Bathing: Stand-by assistance Upper Body Dressing: Stand-by assistance Lower Body Dressing: Stand-by assistance, Contact guard assistance (donned B shoes fig. 4 technique) Toileting: Contact guard assistance, Minimum assistance (standing balance) Grooming/Bathing/Dressing Activities of Daily Living Cognitive Retraining Safety/Judgement: Fall prevention Bed/Mat Mobility Supine to Sit: Supervision Sit to Stand: Supervision Bed to Chair: Supervision Most Recent Physical Functioning:  
Gross Assessment: 
  
         
  
Posture: 
  
Balance: 
  Bed Mobility: 
Supine to Sit: Supervision Wheelchair Mobility: 
  
Transfers: 
Sit to Stand: Supervision Bed to Chair: Supervision Patient Vitals for the past 6 hrs: 
 BP BP Patient Position SpO2 Pulse 09/15/18 1140 102/68 At rest 93 % 66 Mental Status Neurologic State: Alert Orientation Level: Oriented to person Cognition: Follows commands Perception: Cues to maintain midline in standing Perseveration: No perseveration noted Safety/Judgement: Fall prevention Physical Skills Involved: 
1. Balance 2. Strength 3. Activity Tolerance 4. Pain (acute) Cognitive Skills Affected (resulting in the inability to perform in a timely and safe manner): 1. Perception Psychosocial Skills Affected: 1. Habits/Routines 2. Self-Awareness Number of elements that affect the Plan of Care: 5+:  HIGH COMPLEXITY CLINICAL DECISION MAKIN [facility] [address] [address] AM-PAC 6 Clicks Daily Activity Inpatient Short Form How much help from another person does the patient currently need. .. Total A Lot A Little None 1. Putting on and taking off regular lower body clothing? [] 1   [] 2   [x] 3   [] 4  
2. Bathing (including washing, rinsing, drying)? [] 1   [] 2   [x] 3   [] 4  
3. Toileting, which includes using toilet, bedpan or urinal?   [] 1   [] 2   [x] 3   [] 4  
4. Putting on and taking off regular upper body clothing?    [] 1   [] 2   [x] 3   [] 4  
 5. Taking care of personal grooming such as brushing teeth? [] 1   [] 2   [] 3   [x] 4  
6. Eating meals? [] 1   [] 2   [] 3   [x] 4  
© 2007, Trustees of [address] [place] [address] [address] [id number], under license to [employer]. All rights reserved Score:  Initial: 19 Most Recent: X (Date: -- ) Interpretation of Tool:  Represents activities that are increasingly more difficult (i.e. Bed mobility, Transfers, Gait). Score 24 23 22-20 19-15 14-10 9-7 6 Modifier CH CI CJ CK CL CM CN   
 
? Self Care:  
  - CURRENT STATUS: CK - 40%-59% impaired, limited or restricted  - GOAL STATUS: CJ - 20%-39% impaired, limited or restricted  - D/C STATUS:  ---------------To be determined--------------- [place] / [place] / Product Type: Medicare /   
 
Medical Necessity:    
· Patient is expected to demonstrate progress in balance, coordination and functional technique to decrease assistance required with self care and functional mobiltiy and improve safety during self care and functional mobiltiy. Reason for Services/Other Comments: 
· Patient continues to require skilled intervention due to decreased self care and functional mobiltiy. Use of outcome tool(s) and clinical judgement create a POC that gives a: LOW COMPLEXITY  
 
 
 
TREATMENT:  
(In addition to Assessment/Re-Assessment sessions the following treatments were rendered) Pre-treatment Symptoms/Complaints:   
Pain: Initial:  
Pain Intensity 1: 0  Post Session:  7/10 in L side neck, back UE with activity, Therapeutic Activity: (    23): Therapeutic activities including bed mobility and functional transfer to improve mobility and strength. Required min   to promote motor control of bilateral, upper extremity(s), lower extremity(s). B UE's Date: 
9/15/18 Date: 
 Date: Activity/Exercise Parameters Parameters Parameters Shoulder flexion/extension  10 reps Shoulder horizontal abduction/adduction  10 reps Elbow flexion/extension  10 reps Wrist flexion/extension  10 reps Braces/Orthotics/Lines/Etc:  
· O2 Device: Nasal cannula Treatment/Session Assessment:   
· Response to Treatment:  Tolerated fair, lost his balance required assistance to steady · Interdisciplinary Collaboration:  
o Certified Occupational Therapy Assistant 
o Registered Nurse · After treatment position/precautions:  
o Up in chair 
o Bed/Chair-wheels locked 
o Bed in low position 
o Call light within reach 
o RN notified · Compliance with Program/Exercises: compliant most of the time. · Recommendations/Intent for next treatment session: \"Next visit will focus on advancements to more challenging activities and reduction in assistance provided\". Total Treatment Duration: OT Patient Time In/Time Out Time In: 1082 Time Out: 1334 Kyler Fraser

## 2018-09-16 ENCOUNTER — APPOINTMENT (OUTPATIENT)
Dept: GENERAL RADIOLOGY | Age: 63
DRG: 074 | End: 2018-09-16
Attending: INTERNAL MEDICINE
Payer: MEDICARE

## 2018-09-16 LAB
ANION GAP SERPL CALC-SCNC: 10 MMOL/L
BUN SERPL-MCNC: 14 MG/DL (ref 8–23)
CALCIUM SERPL-MCNC: 8.3 MG/DL (ref 8.3–10.4)
CHLORIDE SERPL-SCNC: 102 MMOL/L (ref 98–107)
CO2 SERPL-SCNC: 25 MMOL/L (ref 21–32)
CREAT SERPL-MCNC: 0.84 MG/DL (ref 0.8–1.5)
ERYTHROCYTE [DISTWIDTH] IN BLOOD BY AUTOMATED COUNT: 13.2 %
GLUCOSE BLD STRIP.AUTO-MCNC: 133 MG/DL (ref 65–100)
GLUCOSE BLD STRIP.AUTO-MCNC: 239 MG/DL (ref 65–100)
GLUCOSE BLD STRIP.AUTO-MCNC: 299 MG/DL (ref 65–100)
GLUCOSE BLD STRIP.AUTO-MCNC: 92 MG/DL (ref 65–100)
GLUCOSE SERPL-MCNC: 136 MG/DL (ref 65–100)
HCT VFR BLD AUTO: 36.3 % (ref 41.1–50.3)
HGB BLD-MCNC: 12.6 G/DL (ref 13.6–17.2)
MCH RBC QN AUTO: 30.2 PG (ref 26.1–32.9)
MCHC RBC AUTO-ENTMCNC: 34.7 G/DL (ref 31.4–35)
MCV RBC AUTO: 87.1 FL (ref 79.6–97.8)
NRBC # BLD: 0 K/UL (ref 0–0.2)
PLATELET # BLD AUTO: 191 K/UL (ref 150–450)
PMV BLD AUTO: 11.3 FL (ref 9.4–12.3)
POTASSIUM SERPL-SCNC: 3.7 MMOL/L (ref 3.5–5.1)
RBC # BLD AUTO: 4.17 M/UL (ref 4.23–5.6)
SODIUM SERPL-SCNC: 137 MMOL/L (ref 136–145)
TROPONIN I SERPL-MCNC: <0.02 NG/ML (ref 0.02–0.05)
WBC # BLD AUTO: 4.6 K/UL (ref 4.3–11.1)

## 2018-09-16 PROCEDURE — 82962 GLUCOSE BLOOD TEST: CPT

## 2018-09-16 PROCEDURE — 36415 COLL VENOUS BLD VENIPUNCTURE: CPT

## 2018-09-16 PROCEDURE — 71045 X-RAY EXAM CHEST 1 VIEW: CPT

## 2018-09-16 PROCEDURE — 80048 BASIC METABOLIC PNL TOTAL CA: CPT

## 2018-09-16 PROCEDURE — 74011250637 HC RX REV CODE- 250/637: Performed by: FAMILY MEDICINE

## 2018-09-16 PROCEDURE — 74011250636 HC RX REV CODE- 250/636: Performed by: INTERNAL MEDICINE

## 2018-09-16 PROCEDURE — 84484 ASSAY OF TROPONIN QUANT: CPT

## 2018-09-16 PROCEDURE — 93005 ELECTROCARDIOGRAM TRACING: CPT | Performed by: INTERNAL MEDICINE

## 2018-09-16 PROCEDURE — 74011250637 HC RX REV CODE- 250/637: Performed by: INTERNAL MEDICINE

## 2018-09-16 PROCEDURE — 65270000029 HC RM PRIVATE

## 2018-09-16 PROCEDURE — 94760 N-INVAS EAR/PLS OXIMETRY 1: CPT

## 2018-09-16 PROCEDURE — 74011636637 HC RX REV CODE- 636/637: Performed by: INTERNAL MEDICINE

## 2018-09-16 PROCEDURE — 74011250637 HC RX REV CODE- 250/637: Performed by: PSYCHIATRY & NEUROLOGY

## 2018-09-16 PROCEDURE — 74011250636 HC RX REV CODE- 250/636: Performed by: PSYCHIATRY & NEUROLOGY

## 2018-09-16 PROCEDURE — 85027 COMPLETE CBC AUTOMATED: CPT

## 2018-09-16 RX ORDER — BACLOFEN 10 MG/1
20 TABLET ORAL 4 TIMES DAILY
Status: DISCONTINUED | OUTPATIENT
Start: 2018-09-16 | End: 2018-09-18 | Stop reason: HOSPADM

## 2018-09-16 RX ORDER — NITROGLYCERIN 0.4 MG/1
0.4 TABLET SUBLINGUAL AS NEEDED
Status: DISCONTINUED | OUTPATIENT
Start: 2018-09-16 | End: 2018-09-18 | Stop reason: HOSPADM

## 2018-09-16 RX ORDER — NITROGLYCERIN 0.4 MG/1
TABLET SUBLINGUAL
Status: ACTIVE
Start: 2018-09-16 | End: 2018-09-17

## 2018-09-16 RX ADMIN — INSULIN LISPRO 6 UNITS: 100 INJECTION, SOLUTION INTRAVENOUS; SUBCUTANEOUS at 22:00

## 2018-09-16 RX ADMIN — INSULIN GLARGINE 25 UNITS: 100 INJECTION, SOLUTION SUBCUTANEOUS at 21:54

## 2018-09-16 RX ADMIN — LEVOTHYROXINE SODIUM 75 MCG: 75 TABLET ORAL at 08:30

## 2018-09-16 RX ADMIN — ESCITALOPRAM OXALATE 10 MG: 10 TABLET ORAL at 08:30

## 2018-09-16 RX ADMIN — ACETAMINOPHEN 500 MG: 500 TABLET, FILM COATED ORAL at 08:30

## 2018-09-16 RX ADMIN — HEPARIN SODIUM 5000 UNITS: 5000 INJECTION INTRAVENOUS; SUBCUTANEOUS at 21:53

## 2018-09-16 RX ADMIN — HEPARIN SODIUM 5000 UNITS: 5000 INJECTION INTRAVENOUS; SUBCUTANEOUS at 05:50

## 2018-09-16 RX ADMIN — INSULIN LISPRO 5 UNITS: 100 INJECTION, SOLUTION INTRAVENOUS; SUBCUTANEOUS at 12:18

## 2018-09-16 RX ADMIN — INSULIN LISPRO 5 UNITS: 100 INJECTION, SOLUTION INTRAVENOUS; SUBCUTANEOUS at 08:27

## 2018-09-16 RX ADMIN — IMMUNE GLOBULIN (HUMAN) 30 G: 10 INJECTION INTRAVENOUS; SUBCUTANEOUS at 09:11

## 2018-09-16 RX ADMIN — ZOLPIDEM TARTRATE 5 MG: 5 TABLET, COATED ORAL at 22:07

## 2018-09-16 RX ADMIN — ATORVASTATIN CALCIUM 40 MG: 40 TABLET, FILM COATED ORAL at 21:53

## 2018-09-16 RX ADMIN — BACLOFEN 20 MG: 10 TABLET ORAL at 17:24

## 2018-09-16 RX ADMIN — INSULIN LISPRO 4 UNITS: 100 INJECTION, SOLUTION INTRAVENOUS; SUBCUTANEOUS at 12:19

## 2018-09-16 RX ADMIN — ASPIRIN 81 MG: 81 TABLET, COATED ORAL at 08:30

## 2018-09-16 RX ADMIN — HEPARIN SODIUM 5000 UNITS: 5000 INJECTION INTRAVENOUS; SUBCUTANEOUS at 15:10

## 2018-09-16 RX ADMIN — BACLOFEN 20 MG: 10 TABLET ORAL at 12:19

## 2018-09-16 RX ADMIN — BACLOFEN 10 MG: 10 TABLET ORAL at 08:30

## 2018-09-16 RX ADMIN — DIPHENHYDRAMINE HYDROCHLORIDE 12.5 MG: 50 INJECTION, SOLUTION INTRAMUSCULAR; INTRAVENOUS at 08:30

## 2018-09-16 RX ADMIN — BACLOFEN 20 MG: 10 TABLET ORAL at 21:53

## 2018-09-16 RX ADMIN — GABAPENTIN 300 MG: 300 CAPSULE ORAL at 08:30

## 2018-09-16 RX ADMIN — NITROGLYCERIN 0.4 MG: 0.4 TABLET, ORALLY DISINTEGRATING SUBLINGUAL at 17:23

## 2018-09-16 NOTE — PROGRESS NOTES
Patient given single dose of Nitro and patient noted marked improvement in his chest pain and shortness of breath. Please see flow sheet for details of serial blood pressures.

## 2018-09-16 NOTE — PROGRESS NOTES
Patient's pm assessment unchanged from this am with the exception of less weakness of the left arm after increasing Baclofen dose. Patient's family at bedside. No complaints or needs at this time.

## 2018-09-16 NOTE — PROGRESS NOTES
Hospitalist Progress Note Admit Date:  2018  3:53 PM  
Name:  Andre Orlando Age:  70 y.o. 
:  1955 MRN:  [id number] PCP:  Veto Montano MD 
Treatment Team: Attending Provider: Emily Hughes MD; Consulting Provider: Megan Mcneill DO Subjective: LUE is uncomfortable this morning, ROM improved. Overall symptoms are about the same. Appetite is ok. No other new pain. Objective:  
Patient Vitals for the past 24 hrs: 
 Temp Pulse Resp BP SpO2  
18 08 98 °F (36.7 °C) 83 18 160/86 98 % 18 0411 98 °F (36.7 °C) 71 18 124/80 96 % 09/15/18 2348 98.1 °F (36.7 °C) 80 17 122/72 100 % 09/15/18 2123 - - - - 98 % 09/15/18 1924 97.7 °F (36.5 °C) 74 20 (!) 149/92 95 % 09/15/18 1615 97.5 °F (36.4 °C) 68 18 123/85 94 % 09/15/18 1140 97.1 °F (36.2 °C) 66 18 102/68 93 % Oxygen Therapy O2 Sat (%): 98 % (18) Pulse via Oximetry: 74 beats per minute (09/15/18 2123) O2 Device: CPAP mask (09/15/18 2123) O2 Flow Rate (L/min): 0 l/min (09/15/18 2123) No intake or output data in the 24 hours ending 18 3781 General:    Well nourished. Alert. CV:   RRR. No murmur, rub, or gallop. Lungs:   CTAB. No wheezing, rhonchi, or rales. Abdomen:   Soft, nontender, nondistended. Extremities: Warm and dry. No cyanosis or edema. Left shoulder pain. Good ROM of left elbow, limited to left shoulder. Skin:     No rashes or jaundice. Neuro:  No gross focal deficits. Mild numbness and weakness of UEs. Data Review: 
I have reviewed all labs, meds, telemetry events, and studies from the last 24 hours: 
 
Recent Results (from the past 24 hour(s)) GLUCOSE, POC Collection Time: 09/15/18 11:24 AM  
Result Value Ref Range Glucose (POC) 232 (H) 65 - 100 mg/dL GLUCOSE, POC Collection Time: 09/15/18  4:33 PM  
Result Value Ref Range Glucose (POC) 133 (H) 65 - 100 mg/dL GLUCOSE, POC  Collection Time: 09/15/18  9:26 PM  
 Result Value Ref Range Glucose (POC) 111 (H) 65 - 100 mg/dL PLEASE READ & DOCUMENT PPD TEST IN 48 HRS Collection Time: 09/15/18 10:57 PM  
Result Value Ref Range PPD  Negative  
 mm Induration  mm CBC W/O DIFF Collection Time: 18  5:27 AM  
Result Value Ref Range WBC 4.6 4.3 - 11.1 K/uL  
 RBC 4.17 (L) 4.23 - 5.6 M/uL  
 HGB 12.6 (L) 13.6 - 17.2 g/dL HCT 36.3 (L) 41.1 - 50.3 % MCV 87.1 79.6 - 97.8 FL  
 MCH 30.2 26.1 - 32.9 PG  
 MCHC 34.7 31.4 - 35.0 g/dL  
 RDW 13.2 % PLATELET 186 159 - 543 K/uL MPV 11.3 9.4 - 12.3 FL ABSOLUTE NRBC 0.00 0.0 - 0.2 K/uL METABOLIC PANEL, BASIC Collection Time: 18  5:27 AM  
Result Value Ref Range Sodium 137 136 - 145 mmol/L Potassium 3.7 3.5 - 5.1 mmol/L Chloride 102 98 - 107 mmol/L  
 CO2 25 21 - 32 mmol/L Anion gap 10 mmol/L Glucose 136 (H) 65 - 100 mg/dL BUN 14 8 - 23 MG/DL Creatinine 0.84 0.8 - 1.5 MG/DL  
 GFR est AA >60 >60 ml/min/1.73m2 GFR est non-AA >60 ml/min/1.73m2 Calcium 8.3 8.3 - 10.4 MG/DL  
GLUCOSE, POC Collection Time: 18  7:22 AM  
Result Value Ref Range Glucose (POC) 133 (H) 65 - 100 mg/dL All Micro Results None Results for orders placed or performed during the hospital encounter of 18  
2D ECHO COMPLETE ADULT (TTE) W OR WO CONTR Narrative [facility] [address] [place], [address] [facility] 
[phone] Transthoracic Echocardiogram 
2D, M-mode, Doppler, and Color Doppler Patient: Eusebio Bruno 
MR #: [id number] : 1955 Age: 70 years Gender: Male Study date: 14-Sep-2018 Account #: [de-identified] Height: 69 in 
Weight: 224.6 lb 
BSA: 2.17 mï¾² Status:Routine Location: 359 BP: 89/ 46 Allergies: NO KNOWN ALLERGIES Sonographer:  Whit Deluca RD Group:  [id number] Cardiology Referring Physician:  Hoa Villafana MD 
Reading Physician:  Yesenia Melo.  Chance Mohan MD [facility] 
 
 INDICATIONS: TIA with transient neurological disturbance. PROCEDURE: This was a routine study. A transthoracic echocardiogram was 
performed. The study included complete 2D imaging, M-mode, complete spectral 
Doppler, and color Doppler. Intravenous contrast (Definity) was administered. Image quality was adequate. LEFT VENTRICLE: Size was normal. Systolic function was normal. Ejection 
fraction was estimated in the range of 55 % to 60 %. There were no regional 
wall motion abnormalities. Wall thickness was mildly increased. Avg. E/e'= 
11.6. Left ventricular diastolic function parameters were normal. 
 
RIGHT VENTRICLE: The size was normal. Systolic function was normal. The 
tricuspid jet envelope definition was inadequate for estimation of RV  
systolic 
pressure. LEFT ATRIUM: Size was normal. 
 
ATRIAL SEPTUM: Contrast injection was performed. There was no right-to-left 
shunt, with provocative maneuvers to increase right atrial pressure. RIGHT ATRIUM: Size was normal. 
 
SYSTEMIC VEINS: IVC: The inferior vena cava was normal in size and course. AORTIC VALVE: The valve was trileaflet. Leaflets exhibited mild sclerosis. There was no evidence for stenosis. There was no insufficiency. MITRAL VALVE: There was mild calcification of the anterior leaflet. There was 
no evidence for stenosis. There was no regurgitation. TRICUSPID VALVE: The valve structure was normal. There was no evidence for 
stenosis. There was trace regurgitation. PULMONIC VALVE: Not well visualized. There was no evidence for stenosis. There 
was trace insufficiency. PERICARDIUM: There was no pericardial effusion. AORTA: The root exhibited dilatation. (4.3cm) There was mild dilatation of  
the 
ascending aorta. (4.0 cm) SUMMARY: 
 
-  Left ventricle: Systolic function was normal. Ejection fraction was 
estimated in the range of 55 % to 60 %. There were no regional wall motion abnormalities. Wall thickness was mildly increased. Avg. E/e'= 11.6. Left 
ventricular diastolic function parameters were normal. 
 
-  Atrial septum: Contrast injection was performed. There was no  
right-to-left 
shunt, with provocative maneuvers to increase right atrial pressure. -  Aorta, systemic arteries: There was mild dilatation of the ascending  
aorta. (4.0 cm) SYSTEM MEASUREMENT TABLES 
 
2D mode AoR Diam (2D): 4.3 cm 
LA Dimension (2D): 3.5 cm Left Atrium Systolic Volume Index; Method of Disks, Biplane; 2D mode;: 28.6 
ml/m2 IVS/LVPW (2D): 1 IVSd (2D): 1.1 cm 
LVIDd (2D): 4.2 cm LVIDs (2D): 2.1 cm 
LVOT Area (2D): 4.9 cm2 LVPWd (2D): 1.1 cm RVIDd (2D): 3.5 cm Tissue Doppler Imaging LV Peak Early Monahan Tissue Carlin; Lateral MA (TDI): 9.1 cm/s LV Peak Early Monahan Tissue Carlin; Medial MA (TDI): 6 cm/s Unspecified Scan Mode Peak Grad; Mean; Antegrade Flow: 6 mm[Hg] Vmax; Antegrade Flow: 123 cm/s LVOT Diam: 2.5 cm 
MV Peak Carlin/LV Peak Tissue Carlin E-Wave; Lateral MA: 9.2 MV Peak Carlin/LV Peak Tissue Carlin E-Wave; Medial MA: 14 Prepared and signed by 
 
Clraice Rosenbaum. Nasir Hoang MD [facility] Signed 14-Sep-2018 14:37:09 Current Meds: 
Current Facility-Administered Medications Medication Dose Route Frequency  zolpidem (AMBIEN) tablet 5 mg  5 mg Oral QHS PRN  
 aspirin delayed-release tablet 81 mg  81 mg Oral DAILY  atorvastatin (LIPITOR) tablet 40 mg  40 mg Oral QHS  escitalopram oxalate (LEXAPRO) tablet 10 mg  10 mg Oral DAILY  levothyroxine (SYNTHROID) tablet 75 mcg  75 mcg Oral ACB  insulin lispro (HUMALOG) injection 5 Units  5 Units SubCUTAneous TIDAC  
 baclofen (LIORESAL) tablet 10 mg  10 mg Oral TID  immune globulin 10% (GAMUNEX-C) infusion 30 g  30 g IntraVENous DAILY  diphenhydrAMINE (BENADRYL) injection 12.5 mg  12.5 mg IntraVENous DAILY  acetaminophen (TYLENOL) tablet 500 mg  500 mg Oral DAILY  gabapentin (NEURONTIN) capsule 300 mg  300 mg Oral DAILY  ondansetron (ZOFRAN) injection 4 mg  4 mg IntraVENous Q6H PRN  
 heparin (porcine) injection 5,000 Units  5,000 Units SubCUTAneous Q8H  
 insulin glargine (LANTUS) injection 25 Units  25 Units SubCUTAneous QHS  insulin lispro (HUMALOG) injection   SubCUTAneous AC&HS Other Studies (last 24 hours): No results found. Assessment and Plan:  
 
Hospital Problems as of 9/16/2018  Never Reviewed Codes Class Noted - Resolved POA Major depression ICD-10-CM: F32.9 ICD-9-CM: 296.20  9/13/2018 - Present Yes Hyperglycemia ICD-10-CM: R73.9 ICD-9-CM: 790.29  9/13/2018 - Present Unknown [facility] ICD-10-CM: I63.9 ICD-9-CM: 434.91  9/13/2018 - Present Unknown Thalamic infarct, acute ([facility] ICD-10-CM: I63.9 ICD-9-CM: 434.91  11/21/2017 - Present Hyperlipidemia ICD-10-CM: E78.5 ICD-9-CM: 272.4  11/19/2017 - Present Yes * (Principal)TIA (transient ischemic attack) ICD-10-CM: G45.9 ICD-9-CM: 435.9  11/18/2017 - Present Yes Hypertension ICD-10-CM: I10 
ICD-9-CM: 401.9  11/18/2017 - Present Yes Acquired hypothyroidism (Chronic) ICD-10-CM: E03.9 ICD-9-CM: 244.9  11/18/2017 - Present Yes Plan: # Uncontrolled new onset DM                       - A1C 14, basal/bolus/sliding scale insulin, BS are much better                       - Statin, DM educator                       - Needs close outpt follow up and therapeutic lifestyle changes 
   
# Diabetic amyotrophy 
                      - Neuro consultation appreciated - IVIg x5 days (last dose will be 9/18) - Con't therapies 
   
# Left shoulder pain 
                      - Xray reviewed; likely impingement but will monitor response to IVIg now that diabetic amyotrophy is suspected                       - ? MRI outpt; con't therapies   - Baclofen 
   
# SHANTELL 
                      - Resolved 
   
# HTN 
                       - Restart home meds today # Hyponatremia                       - Resolved with sugar control 
   
# PPx                       - GI: not indicated 
                      - DVT: heparin 
   
# FEN/FIT 
                      - IVFs; replete lytes prn; DM diet 
                      - No grewal; PIV; dc tele 
   
# Dispo 
                      - Blood sugars are much better. IVIg x5 days (until 9/18) for diabetic amyotrophy. -- Paged by nursing around [id number], pt c/o chest pain and SOB. I evaluated pt at bedside. Pain is left sided/substernal. Says he has some pain similar to this when his baclofen wears off. Does have some relief after 1 SL nitro. He says his PCP gives him valium but he doesn't take it very often; unsure why he has it, thinks maybe anxiety. VS are normal; O2 sats were 100% on RA but he is currently on 2L NC. Physical exam is unremarkable -- heart RRR w/o m/g/r; lungs clear b/l without w/r/r. STAT ekg, CXR, trop ordered. Prelim CXR reviewed by me does not show evidence of PTX or fluid overload. EKG and trop pending. Will f/u. DC planning/Dispo:   
Diet:  DIET DIABETIC CONSISTENT CARB Signed: 
Arlen Harris MD

## 2018-09-16 NOTE — PROGRESS NOTES
Patient with complaints of crushing chest pain and shortness of breath. Respiratory rate is 24 bpm and shallow admittedly due to his pain. Spoke with Dr. Jackson Martin and received order for STAT EKG, chest X-ray, Troponin, and Nitro 0.4 x 1. Dr. Jackson Martin to patient's bedside. Medicated with nitro as ordered. Will continue to follow.

## 2018-09-16 NOTE — PROGRESS NOTES
Problem: Falls - Risk of 
Goal: *Absence of Falls Document Jackson Reas Fall Risk and appropriate interventions in the flowsheet. Outcome: Progressing Towards Goal 
Fall Risk Interventions: 
  
 
  
 
Medication Interventions: Teach patient to arise slowly History of Falls Interventions: Bed/chair exit alarm

## 2018-09-16 NOTE — PROGRESS NOTES
PHYSICAL THERAPY DAILY NOTE 
 
NAME/AGE/GENDER: Jhony Koo is a 70 y.o. male. DATE: 9/16/2018 S: Patient stated he was having chest pain this morning and is very tired right now with some pain in his left arm. He requested to not have therapy \"right now\". O: Patient Supine in bed, Bed/Chair-wheels locked, Bed in low position and Side rails x 3 A: Patient's treatment will be held this AM secondary to patient request due to pain level. P: Will re-attempt to see this patient later this PM for strengthening, mobility, and gait, if time permits. Thank you for the opportunity to serve this patient.   
Marleni Munson, PT

## 2018-09-16 NOTE — PROGRESS NOTES
Patient medicated for blood glucose of 239. Lunch arrived. Patient is resting quietly and has no needs at the present time.

## 2018-09-17 LAB
ATRIAL RATE: 75 BPM
CALCULATED P AXIS, ECG09: 30 DEGREES
CALCULATED R AXIS, ECG10: -16 DEGREES
CALCULATED T AXIS, ECG11: 21 DEGREES
DIAGNOSIS, 93000: NORMAL
GLUCOSE BLD STRIP.AUTO-MCNC: 123 MG/DL (ref 65–100)
GLUCOSE BLD STRIP.AUTO-MCNC: 171 MG/DL (ref 65–100)
GLUCOSE BLD STRIP.AUTO-MCNC: 177 MG/DL (ref 65–100)
GLUCOSE BLD STRIP.AUTO-MCNC: 219 MG/DL (ref 65–100)
P-R INTERVAL, ECG05: 202 MS
Q-T INTERVAL, ECG07: 394 MS
QRS DURATION, ECG06: 94 MS
QTC CALCULATION (BEZET), ECG08: 439 MS
VENTRICULAR RATE, ECG03: 75 BPM

## 2018-09-17 PROCEDURE — 74011250637 HC RX REV CODE- 250/637: Performed by: FAMILY MEDICINE

## 2018-09-17 PROCEDURE — 97110 THERAPEUTIC EXERCISES: CPT

## 2018-09-17 PROCEDURE — 74011636637 HC RX REV CODE- 636/637: Performed by: INTERNAL MEDICINE

## 2018-09-17 PROCEDURE — 74011250637 HC RX REV CODE- 250/637: Performed by: PSYCHIATRY & NEUROLOGY

## 2018-09-17 PROCEDURE — 65270000029 HC RM PRIVATE

## 2018-09-17 PROCEDURE — 97116 GAIT TRAINING THERAPY: CPT

## 2018-09-17 PROCEDURE — 74011250637 HC RX REV CODE- 250/637: Performed by: INTERNAL MEDICINE

## 2018-09-17 PROCEDURE — 74011250636 HC RX REV CODE- 250/636: Performed by: PSYCHIATRY & NEUROLOGY

## 2018-09-17 PROCEDURE — 74011250636 HC RX REV CODE- 250/636: Performed by: INTERNAL MEDICINE

## 2018-09-17 PROCEDURE — 82962 GLUCOSE BLOOD TEST: CPT

## 2018-09-17 PROCEDURE — 94760 N-INVAS EAR/PLS OXIMETRY 1: CPT

## 2018-09-17 RX ADMIN — BACLOFEN 20 MG: 10 TABLET ORAL at 21:57

## 2018-09-17 RX ADMIN — LEVOTHYROXINE SODIUM 75 MCG: 75 TABLET ORAL at 08:09

## 2018-09-17 RX ADMIN — BACLOFEN 20 MG: 10 TABLET ORAL at 08:09

## 2018-09-17 RX ADMIN — HEPARIN SODIUM 5000 UNITS: 5000 INJECTION INTRAVENOUS; SUBCUTANEOUS at 05:55

## 2018-09-17 RX ADMIN — ASPIRIN 81 MG: 81 TABLET, COATED ORAL at 08:10

## 2018-09-17 RX ADMIN — INSULIN LISPRO 2 UNITS: 100 INJECTION, SOLUTION INTRAVENOUS; SUBCUTANEOUS at 12:00

## 2018-09-17 RX ADMIN — INSULIN LISPRO 5 UNITS: 100 INJECTION, SOLUTION INTRAVENOUS; SUBCUTANEOUS at 17:08

## 2018-09-17 RX ADMIN — INSULIN LISPRO 5 UNITS: 100 INJECTION, SOLUTION INTRAVENOUS; SUBCUTANEOUS at 08:24

## 2018-09-17 RX ADMIN — BACLOFEN 20 MG: 10 TABLET ORAL at 13:34

## 2018-09-17 RX ADMIN — ZOLPIDEM TARTRATE 5 MG: 5 TABLET, COATED ORAL at 22:04

## 2018-09-17 RX ADMIN — INSULIN GLARGINE 25 UNITS: 100 INJECTION, SOLUTION SUBCUTANEOUS at 21:56

## 2018-09-17 RX ADMIN — INSULIN LISPRO 4 UNITS: 100 INJECTION, SOLUTION INTRAVENOUS; SUBCUTANEOUS at 17:09

## 2018-09-17 RX ADMIN — GABAPENTIN 300 MG: 300 CAPSULE ORAL at 08:09

## 2018-09-17 RX ADMIN — INSULIN LISPRO 2 UNITS: 100 INJECTION, SOLUTION INTRAVENOUS; SUBCUTANEOUS at 21:56

## 2018-09-17 RX ADMIN — ATORVASTATIN CALCIUM 40 MG: 40 TABLET, FILM COATED ORAL at 21:57

## 2018-09-17 RX ADMIN — HEPARIN SODIUM 5000 UNITS: 5000 INJECTION INTRAVENOUS; SUBCUTANEOUS at 13:34

## 2018-09-17 RX ADMIN — BACLOFEN 20 MG: 10 TABLET ORAL at 17:13

## 2018-09-17 RX ADMIN — INSULIN LISPRO 5 UNITS: 100 INJECTION, SOLUTION INTRAVENOUS; SUBCUTANEOUS at 12:00

## 2018-09-17 RX ADMIN — ESCITALOPRAM OXALATE 10 MG: 10 TABLET ORAL at 08:09

## 2018-09-17 RX ADMIN — ACETAMINOPHEN 500 MG: 500 TABLET, FILM COATED ORAL at 08:10

## 2018-09-17 RX ADMIN — DIPHENHYDRAMINE HYDROCHLORIDE 12.5 MG: 50 INJECTION, SOLUTION INTRAMUSCULAR; INTRAVENOUS at 08:10

## 2018-09-17 RX ADMIN — HEPARIN SODIUM 5000 UNITS: 5000 INJECTION INTRAVENOUS; SUBCUTANEOUS at 21:57

## 2018-09-17 RX ADMIN — IMMUNE GLOBULIN (HUMAN) 30 G: 10 INJECTION INTRAVENOUS; SUBCUTANEOUS at 08:15

## 2018-09-17 NOTE — DIABETES MGMT
. Blood glucose range yesterday  with patient receiving a total 45 units of insulin (Lantus 25 units and Humalog 20 units). Reviewed current insulin regimen: Lantus 25 units hs, Humalog 5 units 3x/day ac and humalog sliding scale coverage 4x/day ac and hs, including type of insulin, timing with meals, onset, peak of action and duration of effect. Pt verbalizes understanding. Reviewed insulin pen injection technique using injection model. Discussed storage of insulin pens, importance of site rotation, and proper disposal of insulin pen needles. Pt declines the opportunity to practice using injection model stating that he knows how to use both insulin pens and vial and syringes because his adopted daughter was diagnosed with type 1 DM when she was 3years old. Pt would prefer insulin pens at discharge. Explained the importance of blood glucose management. Reviewed target goals for HbA1c and blood glucose and the significance of an HbA1c of 14.3 (eA). Recommended monitoring blood glucose qid, fasting, ac with a 2 hour post prandial check after the largest meal of the day and to record in a log book to bring to PCP appointments to assist with medication titration. Pt verbalizes understanding. Discussed the relationship between infection and hyperglycemia. Pt verbalizes understanding. Stressed the importance of follow up care for diabetes management with PCP. Pt would likely benefit from further outpatient diabetes education and plans to discuss [address] as an option with PCP, Chika Yoon. Discussed the importance of following a consistent carbohydrate diet, monitoring blood glucose qid, exercise as per physician guidelines, and medication compliance. Pt will need prescriptions for insulin pens, pen needles, lancets and blood glucose test strips at discharge.  Pt states that he lives at home with his 25 yr old daughter and that his brother lives in the same apartment complex, so he has help close to him. Pt has no further questions at this time.

## 2018-09-17 NOTE — PROGRESS NOTES
Visited with pt regarding plans for discharge, pt plans to return home with [facility] services as previously discussed.

## 2018-09-17 NOTE — PROGRESS NOTES
Per PT, pt states has a ride to go to outpatient therapy. PT recommending outpatient balance training, referral faxed to [facility].

## 2018-09-17 NOTE — PROGRESS NOTES
Problem: Mobility Impaired (Adult and Pediatric) Goal: *Acute Goals and Plan of Care (Insert Text) LTG: 
(1.)Mr. Milan will move from supine to sit and sit to supine  in bed with MODIFIED INDEPENDENCE within 7 treatment day(s). Met 9/15/18 
(2.)Mr. Milan will transfer from bed to chair and chair to bed with STAND BY ASSIST using the least restrictive device within 7 treatment day(s). Met 9/17 
(3.)Mr. Milan will ambulate with STAND BY ASSIST for 200 feet with the least restrictive device within 7 treatment day(s). Met 9/15/18 NEW GOALS 
(4.)Mr. Milan will ambulate independently x 650 feet with the least restrictive device within 5 treatment days. (5.)Mr. Milan will be independent with home exercise program to improve balance and LE strength within 5 treatment days. ________________________________________________________________________________________________ PHYSICAL THERAPY: Daily Note, Treatment Day: 2nd, AM 9/17/2018 INPATIENT: Hospital Day: 5 [place] / Product Type: Medicare /  
  
NAME/AGE/GENDER: J Carlos Babin is a 70 y.o. male PRIMARY DIAGNOSIS: Stroke-like episode ([facility] [address] Hyperglycemia TIA (transient ischemic attack) TIA (transient ischemic attack) ICD-10: Treatment Diagnosis:  
 · Other abnormalities of gait and mobility (R26.89) · Ataxic gait (R26.0) Precaution/Allergies: 
Review of patient's allergies indicates no known allergies. ASSESSMENT:  
 
Mr. Milan still showed occasional sway in gait that was mostly self correcting. [facility] setting & would benefit more from higher level balance retraining at out pt therapy on campus here on [address] made aware of this need. This section established at most recent assessment PROBLEM LIST (Impairments causing functional limitations): 1. Decreased Strength 2. Decreased Transfer Abilities 3. Decreased Ambulation Ability/Technique 4. Decreased Balance INTERVENTIONS PLANNED: (Benefits and precautions of physical therapy have been discussed with the patient.) 1. Bed Mobility 2. Gait Training 3. Transfer Training TREATMENT PLAN: Frequency/Duration: daily for duration of hospital stay Rehabilitation Potential For Stated Goals: Good RECOMMENDED REHABILITATION/EQUIPMENT: (at time of discharge pending progress): Due to the probability of continued deficits (see above) this patient will likely need continued skilled physical therapy after discharge. Equipment:  
? None at this time HISTORY:  
History of Present Injury/Illness (Reason for Referral): Mr. Yary Dempsey is a 69 y/o WM with a h/o CVA in 11/2017 with residual LUE weakness, HTN, MDD and hypothyroidism presenting today with c/o worsening left arm weakness for several days. His daughter is at the bedside and says patient was complaining to her uncle that the symptoms have been present for over 1 week. Apparently the patient had previously said he didn't see the point in coming in sooner because he didn't think anything was done for him during his last stroke. He called his PCP today who instructed him to come to the ED. He had started lifting weights and thought he had injured his arm that way, however his weakness and numbness persisted. He also c/o some left sided weakness. No facial droop or slurred speech was noted. Initial head CT here was unremarkable. His labs show hyponatremia, hyperglycemia and SHANTELL. A1C 11/2017 was 6.3. 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 He says he was compliant with PT after that. ROS otherwise negative. Past Medical History/Comorbidities:  
Mr. Yary Dempsey  has a past medical history of Chronic kidney disease; Hypertension; Major depression; Stroke [facility]) (11/2017); and Thyroid disease. Mr. Yary Dempsey  has no past surgical history on file. Social History/Living Environment:  
Home Environment: Apartment # Steps to Enter: 0 One/Two Story Residence: One story Living Alone: No 
Support Systems: Child(anuradha) Patient Expects to be Discharged to[de-identified] [facility] Current DME Used/Available at Home: Shower chair Prior Level of Function/Work/Activity: 
Ambulatory without an assistive device. Some sporadic unsteadiness but still able to drive short distances from the house and independent with ADL's. Number of Personal Factors/Comorbidities that affect the Plan of Care: 0: LOW COMPLEXITY EXAMINATION:  
Most Recent Physical Functioning:  
Gross Assessment: 
  3-/5 throughout left LE Balance: 
Sitting: Intact; Without support Standing: Impaired; Without support Bed Mobility: 
Rolling: Independent Supine to Sit: Independent Sit to Supine:  (NT) Scooting: Independent Transfers: 
Sit to Stand: Stand-by assistance Stand to Sit: Stand-by assistance Bed to Chair: Stand-by assistance Gait: 
  
Speed/Ewa: Fluctuations Gait Abnormalities:  (initermittent sway that was self correcting) Distance (ft): 650 Feet (ft) Assistive Device:  (none) Ambulation - Level of Assistance: Stand-by assistance Interventions: Safety awareness training;Verbal cues Duration: 12 Minutes Body Structures Involved: 1. Joints 2. Muscles Body Functions Affected: 1. Movement Related Activities and Participation Affected: 1. Mobility Number of elements that affect the Plan of Care: 4+: HIGH COMPLEXITY CLINICAL PRESENTATION:  
Presentation: Stable and uncomplicated: LOW COMPLEXITY CLINICAL DECISION MAKIN \[facility]\"" [address] [address] AM-PAC 6 Clicks Basic Mobility Inpatient Short Form How much difficulty does the patient currently have. .. Unable A Lot A Little None 1. Turning over in bed (including adjusting bedclothes, sheets and blankets)? [] 1   [] 2   [x] 3   [] 4  
2.   Sitting down on and standing up from a chair with arms ( e.g., wheelchair, bedside commode, etc.)   [] 1   [] 2   [x] 3   [] 4  
3. Moving from lying on back to sitting on the side of the bed? [] 1   [] 2   [x] 3   [] 4 How much help from another person does the patient currently need. .. Total A Lot A Little None 4. Moving to and from a bed to a chair (including a wheelchair)? [] 1   [] 2   [x] 3   [] 4  
5. Need to walk in hospital room? [] 1   [] 2   [x] 3   [] 4  
6. Climbing 3-5 steps with a railing? [] 1   [] 2   [x] 3   [] 4  
© 2007, Trustees of [address] [place] [address] [address] [address], under license to [employer]. All rights reserved Score:  Initial: 18 Most Recent: X (Date: -- ) Interpretation of Tool:  Represents activities that are increasingly more difficult (i.e. Bed mobility, Transfers, Gait). Score 24 23 22-20 19-15 14-10 9-7 6 Modifier CH CI CJ CK CL CM CN   
 
? Mobility - Walking and Moving Around:  
  - CURRENT STATUS: CK - 40%-59% impaired, limited or restricted  - GOAL STATUS: CJ - 20%-39% impaired, limited or restricted  - D/C STATUS:  ---------------To be determined--------------- [place] / [place] B / Product Type: Medicare /   
 
Medical Necessity:    
· Patient is expected to demonstrate progress in strength, balance and coordination to increase independence with gait, transfers and bed mobility. Reason for Services/Other Comments: 
· Patient continues to require skilled intervention due to limited functional independence. Use of outcome tool(s) and clinical judgement create a POC that gives a: Clear prediction of patient's progress: LOW COMPLEXITY  
  
 
 
 
TREATMENT:  
(In addition to Assessment/Re-Assessment sessions the following treatments were rendered) Pre-treatment Symptoms/Complaints:  Patient is wanting to get out-pt therapy on campus here, close to home Pain: Initial: visual scale 0/10 Post Session:  0/10 Gait Training (12 Minutes):  Gait training to improve and/or restore physical functioning as related to mobility, strength, balance, coordination and dynamic movement of leg - bilateral to improve functional gait. Ambulated 650 Feet (ft) with Stand-by assistance using a  (none) and minimal Safety awareness training;Verbal cues related to their stride length and heel strike to promote proper body alignment, promote proper body posture and promote proper body mechanics. Therapeutic Exercise: (13 Minutes):  Exercises : instructed pt to perform D1 LE PNF patterned movement on left LE 5 X hourly, also had pt perform standing PNF chop -lift to R & L, side stepping to R & L, back stepping then cross over in front side stepping to R & L, followed by box stepping to improve mobility, strength, balance, coordination and dynamic movement of leg - bilateral and core to improve functional stability & endurance. Required minimal verbal cues to promote proper body alignment, promote proper body posture and promote proper body mechanics. Progressed complexity of movement as indicated. Date: 
9/15/18 Date: 
 Date: Activity/Exercise Parameters Parameters Parameters Clams 8 B Hip abduction 10 B Braces/Orthotics/Lines/Etc:  
· none Treatment/Session Assessment:   
· Response to Treatment:  Patient is motivated & should do well with more advanced therapy · Interdisciplinary Collaboration:  
o Registered Nurse 
o  · After treatment position/precautions:  
o Supine in bed 
o Bed/Chair-wheels locked 
o Bed in low position 
o Call light within reach 
o RN notified · Compliance with Program/Exercises: compliant most of the time. · Recommendations/Intent for next treatment session: \"Next visit will focus on advancements to more challenging activities and reduction in assistance provided\". Total Treatment Duration: PT Patient Time In/Time Out Time In: 2663 Time Out: 4513 Sukhjinder Salgado, PT

## 2018-09-17 NOTE — PROGRESS NOTES
PM Assessment - Patient is alert and oriented x4. No complaint of pain at this time. Numbness and weakness to left side extremities and slight left-sided facial droop. Respirations are even and unlabored. Currently resting in a low, locked bed with call light within reach.

## 2018-09-17 NOTE — PROGRESS NOTES
Hospitalist Progress Note Admit Date:  2018  3:53 PM  
Name:  Ronak Yang Age:  70 y.o. 
:  1955 MRN:  [id number] PCP:  Britni Jones MD 
Treatment Team: Attending Provider: Fabiola An MD; Consulting Provider: yN Luna DO Subjective:  
No chest pain overnight. Resolved after Baclofen. EKG and trop were negative. Pt is on RA w/o chest pain or SOB. He feels well this morning and ROS otherwise negative. Objective:  
Patient Vitals for the past 24 hrs: 
 Temp Pulse Resp BP SpO2  
18 0433 98.1 °F (36.7 °C) 69 16 129/83 97 % 18 0050 98.2 °F (36.8 °C) 90 16 151/89 96 % 18 - - - - 99 % 18 1937 97.9 °F (36.6 °C) 88 18 136/87 95 % 18 1752 - 76 18 (!) 130/96 100 % 18 1740 98.7 °F (37.1 °C) 76 19 (!) 129/93 100 % 18 1733 - 80 20 (!) 134/91 100 % 18 1726 - 88 21 125/84 98 % 18 1723 - 73 24 (!) 148/95 99 % 18 1646 98.1 °F (36.7 °C) 79 18 (!) 151/103 93 % 18 1230 97.7 °F (36.5 °C) 67 18 158/65 97 % 18 1013 - - - - 95 % Oxygen Therapy O2 Sat (%): 97 % (18) Pulse via Oximetry: 88 beats per minute (18) O2 Device: CPAP mask (18) O2 Flow Rate (L/min): 3 l/min (18) FIO2 (%): 32 % (18) No intake or output data in the 24 hours ending 18 8969 General:    Well nourished. Alert. Obese. CV:   RRR. No murmur, rub, or gallop. Lungs:   CTAB. No wheezing, rhonchi, or rales. Abdomen:   Soft, nontender, nondistended. Extremities: Warm and dry. No cyanosis or edema. Skin:     No rashes or jaundice. Neuro:  No gross focal deficits Data Review: 
I have reviewed all labs, meds, telemetry events, and studies from the last 24 hours: 
 
Recent Results (from the past 24 hour(s)) GLUCOSE, POC Collection Time: 18 11:27 AM  
Result Value Ref Range Glucose (POC) 239 (H) 65 - 100 mg/dL GLUCOSE, POC Collection Time: 18  5:18 PM  
Result Value Ref Range Glucose (POC) 92 65 - 100 mg/dL EKG, 12 LEAD, INITIAL Collection Time: 18  5:53 PM  
Result Value Ref Range Ventricular Rate 75 BPM  
 Atrial Rate 75 BPM  
 P-R Interval 202 ms QRS Duration 94 ms Q-T Interval 394 ms QTC Calculation (Bezet) 439 ms Calculated P Axis 30 degrees Calculated R Axis -16 degrees Calculated T Axis 21 degrees Diagnosis Normal sinus rhythm Low voltage QRS Borderline ECG When compared with ECG of 16-SEP-2018 17:52, 
Previous ECG has undetermined rhythm, needs review Confirmed by BILL PERALES (), Michele [address] ([id number]) on 2018 7:38:38 AM 
  
GLUCOSE, POC Collection Time: 18 10:17 PM  
Result Value Ref Range Glucose (POC) 299 (H) 65 - 100 mg/dL GLUCOSE, POC Collection Time: 18  7:34 AM  
Result Value Ref Range Glucose (POC) 123 (H) 65 - 100 mg/dL All Micro Results None Results for orders placed or performed during the hospital encounter of 18  
2D ECHO COMPLETE ADULT (TTE) W OR WO CONTR Narrative [facility] [address] [place], [address] [facility] 
[phone] Transthoracic Echocardiogram 
2D, M-mode, Doppler, and Color Doppler Patient: Birdie Mccormick 
MR #: [id number] : 1955 Age: 70 years Gender: Male Study date: 14-Sep-2018 Account #: [de-identified] Height: 69 in 
Weight: 224.6 lb 
BSA: 2.17 mï¾² Status:Routine Location: 359 BP: 89/ 46 Allergies: NO KNOWN ALLERGIES Sonographer:  Shantelle Parker [facility] Group:  [facility] Referring Physician:  Milvia Argueta MD 
Reading Physician:  Fransisco Leung. Sukhjinder Camejo MD [facility] INDICATIONS: TIA with transient neurological disturbance. PROCEDURE: This was a routine study. A transthoracic echocardiogram was 
performed.  The study included complete 2D imaging, M-mode, complete spectral 
 Doppler, and color Doppler. Intravenous contrast (Definity) was administered. Image quality was adequate. LEFT VENTRICLE: Size was normal. Systolic function was normal. Ejection 
fraction was estimated in the range of 55 % to 60 %. There were no regional 
wall motion abnormalities. Wall thickness was mildly increased. Avg. E/e'= 
11.6. Left ventricular diastolic function parameters were normal. 
 
RIGHT VENTRICLE: The size was normal. Systolic function was normal. The 
tricuspid jet envelope definition was inadequate for estimation of RV  
systolic 
pressure. LEFT ATRIUM: Size was normal. 
 
ATRIAL SEPTUM: Contrast injection was performed. There was no right-to-left 
shunt, with provocative maneuvers to increase right atrial pressure. RIGHT ATRIUM: Size was normal. 
 
SYSTEMIC VEINS: IVC: The inferior vena cava was normal in size and course. AORTIC VALVE: The valve was trileaflet. Leaflets exhibited mild sclerosis. There was no evidence for stenosis. There was no insufficiency. MITRAL VALVE: There was mild calcification of the anterior leaflet. There was 
no evidence for stenosis. There was no regurgitation. TRICUSPID VALVE: The valve structure was normal. There was no evidence for 
stenosis. There was trace regurgitation. PULMONIC VALVE: Not well visualized. There was no evidence for stenosis. There 
was trace insufficiency. PERICARDIUM: There was no pericardial effusion. AORTA: The root exhibited dilatation. (4.3cm) There was mild dilatation of  
the 
ascending aorta. (4.0 cm) SUMMARY: 
 
-  Left ventricle: Systolic function was normal. Ejection fraction was 
estimated in the range of 55 % to 60 %. There were no regional wall motion 
abnormalities. Wall thickness was mildly increased. Avg. E/e'= 11.6. Left 
ventricular diastolic function parameters were normal. 
 
-  Atrial septum: Contrast injection was performed. There was no  
right-to-left shunt, with provocative maneuvers to increase right atrial pressure. -  Aorta, systemic arteries: There was mild dilatation of the ascending  
aorta. (4.0 cm) SYSTEM MEASUREMENT TABLES 
 
2D mode AoR Diam (2D): 4.3 cm 
LA Dimension (2D): 3.5 cm Left Atrium Systolic Volume Index; Method of Disks, Biplane; 2D mode;: 28.6 
ml/m2 IVS/LVPW (2D): 1 IVSd (2D): 1.1 cm 
LVIDd (2D): 4.2 cm LVIDs (2D): 2.1 cm 
LVOT Area (2D): 4.9 cm2 LVPWd (2D): 1.1 cm RVIDd (2D): 3.5 cm Tissue Doppler Imaging LV Peak Early Monahan Tissue Carlin; Lateral MA (TDI): 9.1 cm/s LV Peak Early Monahan Tissue Carlin; Medial MA (TDI): 6 cm/s Unspecified Scan Mode Peak Grad; Mean; Antegrade Flow: 6 mm[Hg] Vmax; Antegrade Flow: 123 cm/s LVOT Diam: 2.5 cm 
MV Peak Carlin/LV Peak Tissue Carlin E-Wave; Lateral MA: 9.2 MV Peak Carlin/LV Peak Tissue Carlin E-Wave; Medial MA: 14 Prepared and signed by 
 
Malika Byrne. [address] MD [facility] Signed 14-Sep-2018 14:37:09 Current Meds: 
Current Facility-Administered Medications Medication Dose Route Frequency  baclofen (LIORESAL) tablet 20 mg  20 mg Oral QID  nitroglycerin (NITROSTAT) tablet 0.4 mg  0.4 mg SubLINGual PRN  
 zolpidem (AMBIEN) tablet 5 mg  5 mg Oral QHS PRN  
 aspirin delayed-release tablet 81 mg  81 mg Oral DAILY  atorvastatin (LIPITOR) tablet 40 mg  40 mg Oral QHS  escitalopram oxalate (LEXAPRO) tablet 10 mg  10 mg Oral DAILY  levothyroxine (SYNTHROID) tablet 75 mcg  75 mcg Oral ACB  insulin lispro (HUMALOG) injection 5 Units  5 Units SubCUTAneous TIDAC  diphenhydrAMINE (BENADRYL) injection 12.5 mg  12.5 mg IntraVENous DAILY  acetaminophen (TYLENOL) tablet 500 mg  500 mg Oral DAILY  gabapentin (NEURONTIN) capsule 300 mg  300 mg Oral DAILY  ondansetron (ZOFRAN) injection 4 mg  4 mg IntraVENous Q6H PRN  
 heparin (porcine) injection 5,000 Units  5,000 Units SubCUTAneous Q8H  
 insulin glargine (LANTUS) injection 25 Units  25 Units SubCUTAneous QHS  insulin lispro (HUMALOG) injection   SubCUTAneous AC&HS Other Studies (last 24 hours): Xr Chest Sngl V Result Date: 9/16/2018 AP chest x-ray: 9/16/2018 INDICATION: Left-sided chest pain Comparison 9/13/2018 This been no no significant interval change. Lung fields and cardiac silhouette are unremarkable for a portable chest x-ray. IMPRESSION: Negative Portable chest x-ray Assessment and Plan:  
 
Hospital Problems as of 9/17/2018  Never Reviewed Codes Class Noted - Resolved POA Major depression ICD-10-CM: F32.9 ICD-9-CM: 296.20  9/13/2018 - Present Yes Hyperglycemia ICD-10-CM: R73.9 ICD-9-CM: 790.29  9/13/2018 - Present Unknown [facility] ICD-10-CM: I63.9 ICD-9-CM: 434.91  9/13/2018 - Present Unknown Thalamic infarct, acute [facility] ICD-10-CM: I63.9 ICD-9-CM: 434.91  11/21/2017 - Present Hyperlipidemia ICD-10-CM: E78.5 ICD-9-CM: 272.4  11/19/2017 - Present Yes * (Principal)TIA (transient ischemic attack) ICD-10-CM: G45.9 ICD-9-CM: 435.9  11/18/2017 - Present Yes Hypertension ICD-10-CM: I10 
ICD-9-CM: 401.9  11/18/2017 - Present Yes Acquired hypothyroidism (Chronic) ICD-10-CM: E03.9 ICD-9-CM: 244.9  11/18/2017 - Present Yes Plan # Uncontrolled new onset DM                       - A1C 14, basal/bolus/sliding scale insulin, BS are much better                       - Statin, DM educator                       - Needs close outpt follow up and therapeutic lifestyle changes - BS much better controlled, occasional highs still 
   
# Diabetic amyotrophy 
                      - Neuro consultation appreciated 
                      - IVIg x5 days (last dose will be 9/18)                       - Con't therapies # Atypical chest pain 
 - EKG, CXR and trop negative; no pain currently - Likely msk given resolution with baclofen # Left shoulder pain                       - Xray reviewed; likely impingement but will monitor response to IVIg now that diabetic amyotrophy is suspected                       - ? MRI outpt; con't therapies - Baclofen 
   
# SHANTELL 
                      - Resolved 
   
# HTN 
                      - home meds 
  
# Hyponatremia                       - Resolved with sugar control 
   
# PPx                       - GI: not indicated 
                      - DVT: heparin 
   
# FEN/FIT 
                      - IVFs; replete lytes prn; DM diet 
                      - No grewal; PIV; dc tele 
   
# Dispo 
                      - Blood sugars are much better. IVIg x5 days (until 9/18) for diabetic amyotrophy. Outpt f/u with PCP for DM; consider outpt neuro f/u as well. DC planning/Dispo:   
Diet:  DIET DIABETIC CONSISTENT CARB Signed: 
Kami Beasley MD

## 2018-09-17 NOTE — PROGRESS NOTES
Pt sitting up in bed, alert & oriented x4. Neuro check via doc flow sheet. Pt reports still having numbness to left arm, sensation present. Bed low & locked, side rails x3 up with call light within reach, pt instructed to call for assistance as needed.

## 2018-09-17 NOTE — PROGRESS NOTES
Problem: Interdisciplinary Rounds Goal: Interdisciplinary Rounds Interdisciplinary team rounds were held 9/17/2018 with the following team members:Care Management, Nursing, Nutrition, Pharmacy, Physical Therapy and Physician and the patient. Plan of care discussed. See clinical pathway and/or care plan for interventions and desired outcomes.

## 2018-09-17 NOTE — PROGRESS NOTES
Care Management Interventions PCP Verified by CM: Yes Mode of Transport at Discharge: Other (see comment) Transition of Care Consult (CM Consult): Other (home with outpatient PT ordered) [address] Yes Physical Therapy Consult: Yes Current Support Network: Lives with Caregiver Confirm Follow Up Transport: Family Plan discussed with Pt/Family/Caregiver: Yes Freedom of Choice Offered: Yes Discharge Location Discharge Placement: Home

## 2018-09-18 ENCOUNTER — APPOINTMENT (OUTPATIENT)
Dept: CT IMAGING | Age: 63
DRG: 074 | End: 2018-09-18
Attending: FAMILY MEDICINE
Payer: MEDICARE

## 2018-09-18 ENCOUNTER — APPOINTMENT (OUTPATIENT)
Dept: ULTRASOUND IMAGING | Age: 63
DRG: 074 | End: 2018-09-18
Attending: FAMILY MEDICINE
Payer: MEDICARE

## 2018-09-18 VITALS
BODY MASS INDEX: 32.58 KG/M2 | HEART RATE: 61 BPM | DIASTOLIC BLOOD PRESSURE: 83 MMHG | TEMPERATURE: 97.6 F | OXYGEN SATURATION: 94 % | HEIGHT: 69 IN | RESPIRATION RATE: 17 BRPM | WEIGHT: 220 LBS | SYSTOLIC BLOOD PRESSURE: 126 MMHG

## 2018-09-18 PROBLEM — E11.44 DIABETIC AMYOTROPHY ASSOCIATED WITH TYPE 2 DIABETES MELLITUS (HCC): Status: ACTIVE | Noted: 2018-09-18

## 2018-09-18 LAB
CREAT SERPL-MCNC: 1.06 MG/DL (ref 0.8–1.5)
GLUCOSE BLD STRIP.AUTO-MCNC: 132 MG/DL (ref 65–100)
GLUCOSE BLD STRIP.AUTO-MCNC: 197 MG/DL (ref 65–100)

## 2018-09-18 PROCEDURE — 93970 EXTREMITY STUDY: CPT

## 2018-09-18 PROCEDURE — 36415 COLL VENOUS BLD VENIPUNCTURE: CPT

## 2018-09-18 PROCEDURE — 71260 CT THORAX DX C+: CPT

## 2018-09-18 PROCEDURE — 74011636637 HC RX REV CODE- 636/637: Performed by: INTERNAL MEDICINE

## 2018-09-18 PROCEDURE — 74011250636 HC RX REV CODE- 250/636: Performed by: INTERNAL MEDICINE

## 2018-09-18 PROCEDURE — 82962 GLUCOSE BLOOD TEST: CPT

## 2018-09-18 PROCEDURE — 74011636320 HC RX REV CODE- 636/320: Performed by: INTERNAL MEDICINE

## 2018-09-18 PROCEDURE — 97530 THERAPEUTIC ACTIVITIES: CPT

## 2018-09-18 PROCEDURE — 74011250637 HC RX REV CODE- 250/637: Performed by: PSYCHIATRY & NEUROLOGY

## 2018-09-18 PROCEDURE — 74011250636 HC RX REV CODE- 250/636: Performed by: FAMILY MEDICINE

## 2018-09-18 PROCEDURE — 74011000258 HC RX REV CODE- 258: Performed by: INTERNAL MEDICINE

## 2018-09-18 PROCEDURE — 82565 ASSAY OF CREATININE: CPT

## 2018-09-18 PROCEDURE — 97535 SELF CARE MNGMENT TRAINING: CPT

## 2018-09-18 PROCEDURE — 77030020263 HC SOL INJ SOD CL0.9% LFCR 1000ML

## 2018-09-18 PROCEDURE — 74011250636 HC RX REV CODE- 250/636: Performed by: PSYCHIATRY & NEUROLOGY

## 2018-09-18 PROCEDURE — 74011250637 HC RX REV CODE- 250/637: Performed by: INTERNAL MEDICINE

## 2018-09-18 RX ORDER — SODIUM CHLORIDE 0.9 % (FLUSH) 0.9 %
10 SYRINGE (ML) INJECTION
Status: COMPLETED | OUTPATIENT
Start: 2018-09-18 | End: 2018-09-18

## 2018-09-18 RX ORDER — INSULIN LISPRO 100 [IU]/ML
INJECTION, SOLUTION INTRAVENOUS; SUBCUTANEOUS
Qty: 1 VIAL | Refills: 0 | Status: SHIPPED | OUTPATIENT
Start: 2018-09-18

## 2018-09-18 RX ORDER — GABAPENTIN 300 MG/1
300 CAPSULE ORAL DAILY
Qty: 30 CAP | Refills: 0 | Status: SHIPPED | OUTPATIENT
Start: 2018-09-19

## 2018-09-18 RX ORDER — SODIUM CHLORIDE 9 MG/ML
100 INJECTION, SOLUTION INTRAVENOUS CONTINUOUS
Status: DISCONTINUED | OUTPATIENT
Start: 2018-09-18 | End: 2018-09-18 | Stop reason: HOSPADM

## 2018-09-18 RX ORDER — INSULIN GLARGINE 100 [IU]/ML
30 INJECTION, SOLUTION SUBCUTANEOUS
Qty: 1 VIAL | Refills: 1 | Status: SHIPPED | OUTPATIENT
Start: 2018-09-18

## 2018-09-18 RX ADMIN — ESCITALOPRAM OXALATE 10 MG: 10 TABLET ORAL at 08:12

## 2018-09-18 RX ADMIN — INSULIN LISPRO 2 UNITS: 100 INJECTION, SOLUTION INTRAVENOUS; SUBCUTANEOUS at 12:42

## 2018-09-18 RX ADMIN — ACETAMINOPHEN 500 MG: 500 TABLET, FILM COATED ORAL at 08:12

## 2018-09-18 RX ADMIN — LEVOTHYROXINE SODIUM 75 MCG: 75 TABLET ORAL at 08:12

## 2018-09-18 RX ADMIN — SODIUM CHLORIDE 100 ML/HR: 900 INJECTION, SOLUTION INTRAVENOUS at 10:58

## 2018-09-18 RX ADMIN — IOPAMIDOL 100 ML: 755 INJECTION, SOLUTION INTRAVENOUS at 10:46

## 2018-09-18 RX ADMIN — SODIUM CHLORIDE 100 ML: 900 INJECTION, SOLUTION INTRAVENOUS at 10:46

## 2018-09-18 RX ADMIN — BACLOFEN 20 MG: 10 TABLET ORAL at 08:11

## 2018-09-18 RX ADMIN — HEPARIN SODIUM 5000 UNITS: 5000 INJECTION INTRAVENOUS; SUBCUTANEOUS at 05:23

## 2018-09-18 RX ADMIN — HEPARIN SODIUM 5000 UNITS: 5000 INJECTION INTRAVENOUS; SUBCUTANEOUS at 14:00

## 2018-09-18 RX ADMIN — DIPHENHYDRAMINE HYDROCHLORIDE 12.5 MG: 50 INJECTION, SOLUTION INTRAMUSCULAR; INTRAVENOUS at 08:11

## 2018-09-18 RX ADMIN — INSULIN LISPRO 5 UNITS: 100 INJECTION, SOLUTION INTRAVENOUS; SUBCUTANEOUS at 08:10

## 2018-09-18 RX ADMIN — GABAPENTIN 300 MG: 300 CAPSULE ORAL at 08:12

## 2018-09-18 RX ADMIN — ASPIRIN 81 MG: 81 TABLET, COATED ORAL at 08:12

## 2018-09-18 RX ADMIN — INSULIN LISPRO 5 UNITS: 100 INJECTION, SOLUTION INTRAVENOUS; SUBCUTANEOUS at 12:42

## 2018-09-18 RX ADMIN — Medication 10 ML: at 10:46

## 2018-09-18 RX ADMIN — BACLOFEN 20 MG: 10 TABLET ORAL at 12:43

## 2018-09-18 RX ADMIN — IMMUNE GLOBULIN (HUMAN) 30 G: 10 INJECTION INTRAVENOUS; SUBCUTANEOUS at 08:35

## 2018-09-18 NOTE — PROGRESS NOTES
Patient seen by Teleneurology for follow up of diabetic amytrophy. The patient feels that he is feeling much better. He is tolerating IVIG well. He has decreased pain and improved motor function. He is coping with finding out he has DM. The patient is hoping for DC soon. Impression: Diabetic Amyotrophy associated with non ketotic hyperosmolar state, improved. From a neurological perspective OK for DC. He should follow up in our office at at [address] [place] [address] with Dr. Becky Benjamin Signed By: Wen Orellana MD   
 September 18, 2018

## 2018-09-18 NOTE — PROGRESS NOTES
Patient helped draw up insulin from a multi dose vial and guided how to give self insulin shot. Pt did very well.

## 2018-09-18 NOTE — PROGRESS NOTES
Problem: Falls - Risk of 
Goal: *Absence of Falls Document Wilber Ozuna Fall Risk and appropriate interventions in the flowsheet. Outcome: Progressing Towards Goal 
Fall Risk Interventions: 
  
 
  
 
Medication Interventions: Bed/chair exit alarm History of Falls Interventions: Bed/chair exit alarm

## 2018-09-18 NOTE — PROGRESS NOTES
Problem: Mobility Impaired (Adult and Pediatric) Goal: *Acute Goals and Plan of Care (Insert Text) LTG: 
(1.)Mr. Gail Davila will move from supine to sit and sit to supine  in bed with MODIFIED INDEPENDENCE within 7 treatment day(s). Met 9/15/18 
(2.)Mr. Gail Davila will transfer from bed to chair and chair to bed with STAND BY ASSIST using the least restrictive device within 7 treatment day(s). Met 9/17 
(3.)Mr. Gail Davila will ambulate with STAND BY ASSIST for 200 feet with the least restrictive device within 7 treatment day(s). Met 9/15/18 NEW GOALS 
(4.)Mr. Gail Davila will ambulate independently x 650 feet with the least restrictive device within 5 treatment days. MET 9/18/18 
(5.)Mr. Gail Davila will be independent with home exercise program to improve balance and LE strength within 5 treatment days. ________________________________________________________________________________________________ PHYSICAL THERAPY: Daily Note, Treatment Day: 3rd, PM 9/18/2018 INPATIENT: Hospital Day: 6 [place] / Product Type: Medicare /  
  
NAME/AGE/GENDER: Padma Mooney is a 70 y.o. male PRIMARY DIAGNOSIS: Stroke-like episode ([facility] [address] Hyperglycemia TIA (transient ischemic attack) TIA (transient ischemic attack) ICD-10: Treatment Diagnosis:  
 · Other abnormalities of gait and mobility (R26.89) · Ataxic gait (R26.0) Precaution/Allergies: 
Review of patient's allergies indicates no known allergies. ASSESSMENT:  
 
Mr. Gail Davila up in chair on contact and feeling pretty good, chance he may go home today. Pt walked in barron for 650 without assistive device. He was intentional about looking left and right during gait. Walked backward about 20 feet without loss of balance. Then worked on adjusting gait speed for about 100 feet to walk quickly. Pt did very well and has made great progress. This section established at most recent assessment PROBLEM LIST (Impairments causing functional limitations): 1. Decreased Strength 2. Decreased Transfer Abilities 3. Decreased Ambulation Ability/Technique 4. Decreased Balance INTERVENTIONS PLANNED: (Benefits and precautions of physical therapy have been discussed with the patient.) 1. Bed Mobility 2. Gait Training 3. Transfer Training TREATMENT PLAN: Frequency/Duration: daily for duration of hospital stay Rehabilitation Potential For Stated Goals: Good RECOMMENDED REHABILITATION/EQUIPMENT: (at time of discharge pending progress): Due to the probability of continued deficits (see above) this patient will likely need continued skilled physical therapy after discharge. Equipment:  
? None at this time HISTORY:  
History of Present Injury/Illness (Reason for Referral): Mr. 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 His daughter is at the bedside and says patient was complaining to her uncle that the symptoms have been present for over 1 week. Apparently the patient had previously said he didn't see the point in coming in sooner because he didn't think anything was done for him during his last stroke. He called his PCP today who instructed him to come to the ED. He had started lifting weights and thought he had injured his arm that way, however his weakness and numbness persisted. He also c/o some left sided weakness. No facial droop or slurred speech was noted. Initial head CT here was unremarkable. His labs show hyponatremia, hyperglycemia and SHANTELL. A1C 11/2017 was 6.3. 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 He says he was compliant with PT after that. ROS otherwise negative. Past Medical History/Comorbidities: Mr. Constance Jackson  has a past medical history of Chronic kidney disease; Hypertension; Major depression; Stroke [facility]) (2017); and Thyroid disease. Mr. Constance Jackson  has no past surgical history on file. Social History/Living Environment:  
Home Environment: Apartment # Steps to Enter: 0 One/Two Story Residence: One story Living Alone: No 
Support Systems: Child(anuradha) Patient Expects to be Discharged to[de-identified] [facility] Current DME Used/Available at Home: Shower chair Prior Level of Function/Work/Activity: 
Ambulatory without an assistive device. Some sporadic unsteadiness but still able to drive short distances from the house and independent with ADL's. Number of Personal Factors/Comorbidities that affect the Plan of Care: 0: LOW COMPLEXITY EXAMINATION:  
Most Recent Physical Functioning:  
Gross Assessment: 
  3-/5 throughout left LE Balance: 
Sitting: Intact Standing: Intact Standing - Static: Good Standing - Dynamic : Good Bed Mobility: 
  
 
  
Transfers: 
Sit to Stand: Independent Stand to Sit: Independent Gait: 
  
Distance (ft): 650 Feet (ft) Ambulation - Level of Assistance: Independent Body Structures Involved: 1. Joints 2. Muscles Body Functions Affected: 1. Movement Related Activities and Participation Affected: 1. Mobility Number of elements that affect the Plan of Care: 4+: HIGH COMPLEXITY CLINICAL PRESENTATION:  
Presentation: Stable and uncomplicated: LOW COMPLEXITY CLINICAL DECISION MAKIN [facility] [address] [address] AM-PAC 6 Clicks Basic Mobility Inpatient Short Form How much difficulty does the patient currently have. .. Unable A Lot A Little None 1. Turning over in bed (including adjusting bedclothes, sheets and blankets)? [] 1   [] 2   [] 3   [x] 4  
2. Sitting down on and standing up from a chair with arms ( e.g., wheelchair, bedside commode, etc.)   [] 1   [] 2   [] 3   [x] 4  
3. Moving from lying on back to sitting on the side of the bed?    [] 1 [] 2   [] 3   [x] 4 How much help from another person does the patient currently need. .. Total A Lot A Little None 4. Moving to and from a bed to a chair (including a wheelchair)? [] 1   [] 2   [] 3   [x] 4  
5. Need to walk in hospital room? [] 1   [] 2   [] 3   [x] 4  
6. Climbing 3-5 steps with a railing? [] 1   [] 2   [x] 3   [] 4  
© 2007, Trustees of [address] [place] [address] [address] [address], under license to [employer]. All rights reserved Score:  Initial: 18 Most Recent: X (Date: -- ) Interpretation of Tool:  Represents activities that are increasingly more difficult (i.e. Bed mobility, Transfers, Gait). Score 24 23 22-20 19-15 14-10 9-7 6 Modifier CH CI CJ CK CL CM CN   
 
? Mobility - Walking and Moving Around:  
  - CURRENT STATUS: CK - 40%-59% impaired, limited or restricted  - GOAL STATUS: CJ - 20%-39% impaired, limited or restricted  - D/C STATUS:  ---------------To be determined--------------- [place] / [place] B / Product Type: Medicare /   
 
Medical Necessity:    
· Patient is expected to demonstrate progress in strength, balance and coordination to increase independence with gait, transfers and bed mobility. Reason for Services/Other Comments: 
· Patient continues to require skilled intervention due to limited functional independence. Use of outcome tool(s) and clinical judgement create a POC that gives a: Clear prediction of patient's progress: LOW COMPLEXITY  
  
 
 
 
TREATMENT:  
(In addition to Assessment/Re-Assessment sessions the following treatments were rendered) Pre-treatment Symptoms/Complaints:  none Pain: Initial: visual scale 0/10 Post Session:  0/10 Therapeutic Activity: (    10): Therapeutic activities including Chair transfers and Ambulation on level ground to improve mobility, strength, balance and coordination.   Required supervision to no assistance while walking in barron. Worked on looking left to right, up and down while walking, backward walking and increasing leona for extended distance without loss of balance   to promote dynamic balance in standing. Date: 
9/15/18 Date: 
 Date: Activity/Exercise Parameters Parameters Parameters Clams 8 B Hip abduction 10 B Braces/Orthotics/Lines/Etc:  
· IV Treatment/Session Assessment:   
· Response to Treatment:  Patient has made great progress · Interdisciplinary Collaboration:  
o Registered Nurse · After treatment position/precautions:  
o Up in chair 
o Bed/Chair-wheels locked 
o Call light within reach · Compliance with Program/Exercises: compliant all of the time. · Recommendations/Intent for next treatment session: \"Next visit will focus on advancements to more challenging activities and reduction in assistance provided\". Total Treatment Duration: PT Patient Time In/Time Out Time In: 8323 Time Out: 9623 Frederic Truong PT

## 2018-09-18 NOTE — PROGRESS NOTES
Discharge instructions reviewed with patient. Opportunity for questions given. Patient verbalized understanding of all discharge and follow up instructions. PIV removed. Prescriptions provided. Patient waiting on his daughter and will call when he is ready to be wheeled out.

## 2018-09-18 NOTE — DISCHARGE INSTRUCTIONS
Follow up instructions from MD:    F/u with pcp in a week with bmp. Advised to drink atleast 2 liters of fluids/day. F/u with Neurology was per their recommendations. Diabetic and 2 gm low sodium diet. Advised to keep a log of all finger stick glucose at home and show it to pcp at follow up. Out pt referral for therapy has been ordered by . Take blood pressure medication if systolic greater then 558 mmhg systolic. F/u with orthopedic physician as out pt for left shoulder arthritis.         DISCHARGE SUMMARY from Nurse    PATIENT INSTRUCTIONS:    After general anesthesia or intravenous sedation, for 24 hours or while taking prescription Narcotics:  · Limit your activities  · Do not drive and operate hazardous machinery  · Do not make important personal or business decisions  · Do  not drink alcoholic beverages  · If you have not urinated within 8 hours after discharge, please contact your surgeon on call. Report the following to your surgeon:  · Excessive pain, swelling, redness or odor of or around the surgical area  · Temperature over 100.5  · Nausea and vomiting lasting longer than 4 hours or if unable to take medications  · Any signs of decreased circulation or nerve impairment to extremity: change in color, persistent  numbness, tingling, coldness or increase pain  · Any questions    What to do at Home:  Recommended activity: Activity as tolerated, SEE ABOVE INSTRUCTIONS    If you experience any of the following symptoms signs of stroke or heart attack go to ER, increased/new pain, nausea/vomiting, please follow up with PCP. *  Please give a list of your current medications to your Primary Care Provider. *  Please update this list whenever your medications are discontinued, doses are      changed, or new medications (including over-the-counter products) are added. *  Please carry medication information at all times in case of emergency situations.     These are general instructions for a healthy lifestyle:    No smoking/ No tobacco products/ Avoid exposure to second hand smoke  Surgeon General's Warning:  Quitting smoking now greatly reduces serious risk to your health. Obesity, smoking, and sedentary lifestyle greatly increases your risk for illness    A healthy diet, regular physical exercise & weight monitoring are important for maintaining a healthy lifestyle    You may be retaining fluid if you have a history of heart failure or if you experience any of the following symptoms:  Weight gain of 3 pounds or more overnight or 5 pounds in a week, increased swelling in our hands or feet or shortness of breath while lying flat in bed. Please call your doctor as soon as you notice any of these symptoms; do not wait until your next office visit. Recognize signs and symptoms of STROKE:    F-face looks uneven    A-arms unable to move or move unevenly    S-speech slurred or non-existent    T-time-call 911 as soon as signs and symptoms begin-DO NOT go       Back to bed or wait to see if you get better-TIME IS BRAIN. Warning Signs of HEART ATTACK     Call 911 if you have these symptoms:   Chest discomfort. Most heart attacks involve discomfort in the center of the chest that lasts more than a few minutes, or that goes away and comes back. It can feel like uncomfortable pressure, squeezing, fullness, or pain.  Discomfort in other areas of the upper body. Symptoms can include pain or discomfort in one or both arms, the back, neck, jaw, or stomach.  Shortness of breath with or without chest discomfort.  Other signs may include breaking out in a cold sweat, nausea, or lightheadedness. Don't wait more than five minutes to call 911 - MINUTES MATTER! Fast action can save your life. Calling 911 is almost always the fastest way to get lifesaving treatment. Emergency Medical Services staff can begin treatment when they arrive -- up to an hour sooner than if someone gets to the hospital by car. The discharge information has been reviewed with the patient. The patient verbalized understanding. Discharge medications reviewed with the patient and appropriate educational materials and side effects teaching were provided. ___________________________________________________________________________________________________________________________________    Stroke: After Your Visit     Your Care Instructions     You have had a stroke. Risk factors for stroke include being overweight, smoking, and sedentary lifestyle. This means that the blood flow to a part of your brain was blocked for some time, which damages the nerve cells in that part of the brain. The part of your body controlled by that part of your brain may not function properly now. The brain is an amazing organ that can heal itself to some degree. The stroke you had damaged part of your brain, but other parts of your brain may take over in some way for the damaged areas. You have already started this process. Going home may be hard for you and your family. The more you can try to do for yourself, the better. Remember to take each day one at a time. Follow-up care is a key part of your treatment and safety. Be sure to make and go to all appointments, and call your doctor if you are having problems. Its also a good idea to know your test results and keep a list of the medicines you take. How can you care for yourself at home? Enter a stroke rehabilitation (rehab) program, if your doctor recommends it. Physical, speech, and occupational therapies can help you manage bathing, dressing, eating, and other basics of daily living. Eat a heart-healthy diet that is low in cholesterol, saturated fat, and salt. Eat lots of fresh fruits and vegetables and foods high in fiber. Increase your activities slowly. Take short rest breaks when you get tired. Gradually increase the amount you walk.  Start out by walking a little more than you did the day before. Do not drive until your doctor says it is okay. It is normal to feel sad or depressed after a stroke. If the blues last, talk to your doctor. If you are having problems with urine leakage, go to the bathroom at regular times, including when you first wake up and at bedtime. Also, limit fluids after dinner. If you are constipated, drink plenty of fluids, enough so that your urine is light yellow or clear like water. If you have kidney, heart, or liver disease and have to limit fluids, talk with your doctor before you increase the amount of fluids you drink. Set up a regular time for using the toilet. If you continue to have constipation, your doctor may suggest using a bulking agent, such as Metamucil, or a stool softener, laxative, or enema. Medicines  Take your medicines exactly as prescribed. Call your doctor if you think you are having a problem with your medicine. You may be taking several medicines. ACE (angiotensin-converting enzyme) inhibitors, angiotensin II receptor blockers (ARBs), beta-blockers, diuretics (water pills), and calcium channel blockers control your blood pressure. Statins help lower cholesterol. Your doctor may also prescribe medicines for depression, pain, sleep problems, anxiety, or agitation. If your doctor has given you medicine that prevents blood clots, such as warfarin (Coumadin), aspirin combined with extended-release dipyridamole (Aggrenox), clopidogrel (Plavix), or aspirin to prevent another stroke, you should:  Tell your dentist, pharmacist, and other health professionals that you take these medicines. Watch for unusual bruising or bleeding, such as blood in your urine, red or black stools, or bleeding from your nose or gums. Get regular blood tests to check your clotting time if you are taking Coumadin. Wear medical alert jewelry that says you take blood thinners. You can buy this at most drugstores.   Do not take any over-the-counter medicines or herbal products without talking to your doctor first.  If you take birth control pills or hormone replacement therapy, talk to your doctor about whether they are right for you. For family members and caregivers  Make the home safe. Set up a room so that your loved one does not have to climb stairs. Be sure the bathroom is on the same floor. Move throw rugs and furniture that could cause falls, and make sure that the lighting is good. Put grab bars and seats in tubs and showers. Find out what your loved one can do and what he or she needs help with. Try not to do things for your loved one that your loved one can do on his or her own. Help him or her learn and practice new skills. Visit and talk with your loved one often. Try doing activities together that you both enjoy, such as playing cards or board games. Keep in touch with your loved one's friends as much as you can, and encourage them to visit. Take care of yourself. Do not try to do everything yourself. Ask other family members to help. Eat well, get enough rest, and take time to do things that you enjoy. Keep up with your own doctor visits, and make sure to take your medicines regularly. Get out of the house as much as you can. Join a local support group. Find out if you qualify for home health care visits to help with rehab or for adult day care. When should you call for help? Call 911 anytime you think you may need emergency care. For example, call if:  You have signs of another stroke. These may include:  Sudden numbness, paralysis, or weakness in your face, arm, or leg, especially on only one side of your body. New problems with walking or balance. Sudden vision changes. Drooling or slurred speech. New problems speaking or understanding simple statements, or you feel confused. A sudden, severe headache that is different from past headaches. Call 911 even if these symptoms go away in a few minutes. You cough up blood.   You vomit blood or what looks like coffee grounds. You pass maroon or very bloody stools. Call your doctor now or seek immediate medical care if:  You have new bruises or blood spots under your skin. You have a nosebleed. Your gums bleed when you brush your teeth. You have blood in your urine. Your stools are black and tarlike or have streaks of blood. You have vaginal bleeding when you are not having your period, or heavy period bleeding. You have new symptoms that may be related to your stroke, such as falls or trouble swallowing. Watch closely for changes in your health, and be sure to contact your doctor if you have any problems. Where can you learn more? Go to [facility]    Enter C294  in the search box to learn more about \"Stroke: After Your Visit\". © 0937-1852 [employer]. Care instructions adapted under license by Emily Jacobo (which disclaims liability or warranty for this information). This care instruction is for use with your licensed healthcare professional. If you have questions about a medical condition or this instruction, always ask your healthcare professional. Marleni Castilloing any warranty or liability for your use of this information.

## 2018-09-18 NOTE — PROGRESS NOTES
Pt ambulating in room, alert & oriented x4. Neuro check via doc flow sheet. Pt reports  Numbness to left arm is less, sensation present. No complaints of pain. Pt returned to bed, bed low & locked, side rails x3 up with call light within reach, pt instructed to call for assistance as needed.

## 2018-09-18 NOTE — PROGRESS NOTES
Shift assessment complete. Pt resting in bed, A&O x4. Respirations present, even, unlabored. HR regular, S1&S2 auscultated. IV patent. Neuro check complete as ordered. All needs met at this time. Bed in lowest position, call light within reach, bed in lowest position. Will continue to monitor throughout the shift.

## 2018-09-18 NOTE — DIABETES MGMT
Patient admitted with TIA. HgA1c 14.3 (eAG 364). Blood glucose range yesterday 123-219 with pt receiving Lantus 25 units and Humalog 23 units. This AM blood glucose 132. Pt states he is going home today. Pt verbalizes that he has a strong support system at home. He states that his daughter who is 25 is a type 1 diabetic, so she knows \"what I need to do. \" Pt verbalizes that he is going to meal plan and that he will \"eat less mashed potatoes and more protein. \" Pt verbalizes that he \"loves green beans. \" Plate method reinforced with patient. Pt current regimen of Lantus 25 units at bedtime, Humalog SSI, and Humalog 5 units before meals reviewed. Pt verbalizes concern about \"mixing up the types of insulin. \" Pt educated on the difference between Lantus and Humalog. Pt encouraged to make a label for the Lantus to remind him to take as directed. Pt states my daughter will help me. Pt verbalizes that \"it's not about beating diabetes it's about making lifestyle changes and living with it. \" Pt prefers insulin pen method. Pt will need prescription for glucometer, glucometer supplies, insulin pens, and insulin pen needles. Pt instructed to check blood glucose ACHS and record in log book to take to PCP appointment. Pt verbalizes understanding. Pt has no other questions about diabetes management at this time.

## 2018-09-18 NOTE — DISCHARGE SUMMARY
Hospitalist Discharge Summary     Admit Date:  2018  3:53 PM   Name:  Devon Eckert   Age:  70 y.o.  :  1955   MRN:  [id number]   PCP:  Toro James MD  Treatment Team: Attending Provider: Preethi Marina MD; Consulting Provider: Celestino Beard DO    Problem List for this Hospitalization:  Hospital Problems as of 2018  Never Reviewed          Codes Class Noted - Resolved POA    * (Principal)Diabetic amyotrophy associated with type 2 diabetes mellitus ([facility] ICD-10-CM: E11.44  ICD-9-CM: 250.60, 353.5  2018 - Present Yes        Major depression ICD-10-CM: F32.9  ICD-9-CM: 296.20  2018 - Present Yes        Hyperglycemia ICD-10-CM: R73.9  ICD-9-CM: 790.29  2018 - Present Yes        Stroke-like episode ([facility] ICD-10-CM: I63.9  ICD-9-CM: 434.91  2018 - Present Yes        Thalamic infarct, acute ([facility] ICD-10-CM: I63.9  ICD-9-CM: 434.91  2017 - Present         Hyperlipidemia ICD-10-CM: E78.5  ICD-9-CM: 272.4  2017 - Present Yes        TIA (transient ischemic attack) ICD-10-CM: G45.9  ICD-9-CM: 435.9  2017 - Present Yes        Hypertension ICD-10-CM: I10  ICD-9-CM: 401.9  2017 - Present Yes        Acquired hypothyroidism (Chronic) ICD-10-CM: E03.9  ICD-9-CM: 244.9  2017 - Present Yes                Admission HPI from 2018:    Mr. Argenis Crane is a 69 y/o WM with a h/o CVA in 2017 with residual LUE weakness, HTN, MDD and hypothyroidism presenting today with c/o worsening left arm weakness for several days. His daughter is at the bedside and says patient was complaining to her uncle that the symptoms have been present for over 1 week. Apparently the patient had previously said he didn't see the point in coming in sooner because he didn't think anything was done for him during his last stroke. He called his PCP today who instructed him to come to the ED.  He had started lifting weights and thought he had injured his arm that way, however his weakness and numbness persisted. He also c/o some left sided weakness. No facial droop or slurred speech was noted. Initial head CT here was unremarkable. His labs show hyponatremia, hyperglycemia and SHANTELL. A1C 11/2017 was 6.3. 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 He says he was compliant with PT after that. ROS otherwise negative. Hospital Course:  Pt was continued of IVF. pt had MRI with no new acute changes. Neurology was consulted-felt based on pts presentation- typical of diabetic amyotrophy- started on IV Immunoglobulin. Pt was again seen by neurology 9/18/18- said ok for discharge from neurology point and f/u as out pt with neurology. pts hbA1c- 14.3, diabetic education done. Pt was started on sliding scale and lantus. Blood sugars improving. SHANTELL resolved. Pt is stable for discharge. Follow up instructions below. Plan was discussed with patient. .  All questions answered. Patient was stable at time of discharge and was instructed to call or return if there are any concerns or recurrence of symptoms. Diagnostic Imaging/Tests:   Xr Shoulder Lt Ap/lat Min 2 V    Result Date: 9/14/2018  THREE-VIEW LEFT SHOULDER: CLINICAL HISTORY:  Moderate left shoulder pain and limited range of motion for one month. COMPARISON:  Portable chest of September 13, 2018. FINDINGS:  No definite fracture, malalignment, or fish bone destruction is evident. No persistent radiopaque foreign body is seen. There is severe acromioclavicular osteoarthritis with prominent inferior spurring. There are overlying radiopaque support devices. IMPRESSION:  Acromioclavicular osteoarthritis with inferior spurring may predispose to impingement. Followup MRI of the shoulder would be useful for further evaluation of possible associated rotator cuff pathology, if clinically indicated.      Mri Brain Wo Cont    Result Date: 9/14/2018  MRI BRAIN WITHOUT CONTRAST 9/14/2018 HISTORY: Pain in left arm. Frozen shoulder. Recent stroke earlier this year. TECHNIQUE: Sagittal and axial T1-weighted, axial T2-weighted, axial and coronal FLAIR, axial T2-weighted gradient-echo, axial diffusion weighted images with ADC maps of the brain. COMPARISON: November 19, 2017 FINDINGS: There is no acute infarction, acute intracranial hemorrhage, intra-axial mass, hydrocephalus, abnormal extra-axial fluid collection. Moderate cerebral volume loss is present. On the T2 weighted and FLAIR sequences, there are a few scattered hyperintense foci within the supratentorial white matter. There has been an interval small infarct in the right thalamus and there is an old small infarct in the posterior medial left thalamus. IMPRESSION: 1. Moderate cerebral volume loss. 2. White matter findings compatible with chronic small vessel ischemic disease. 3. Subacute or old right thalamic infarct that has developed since the November 2017 MRI. Ct Head Wo Cont    Result Date: 9/13/2018  CT HEAD WITHOUT CONTRAST, 9/13/2018 History: Left arm pain. Comparison: CT head without contrast 11/27/2017 Technique:   5 mm axial scans from the skull base to the vertex. All CT scans performed at this facility use one or all of the following: Automated exposure control, adjustment of the mA and/or kVp according to patient's size, iterative reconstruction. Findings:  No evidence of intracranial hemorrhage is seen. No abnormal extra-axial fluid collections are seen. Moderate cortical and positional changes are seen which appear advanced for the patient's age. No evidence for acute hydrocephalus is seen. No evidence of midline shift or herniation is seen. No abnormal edema pattern is seen in a vascular distribution to suggest large artery infarction. Evaluation with bone windows shows no acute osseous changes. The visualized sinuses, middle ears, and mastoid air cells are well aerated. IMPRESSION:  1.   No acute intracranial process evident by noncontrast CT study of the head. Xr Chest Port    Result Date: 2018  Portable chest x-ray CLINICAL INDICATION: Shortness of breath FINDINGS: Single AP view the chest compared to a similar exam dated 3/19/2015 show the lungs to be expanded and clear. No pleural effusion or pneumothorax. IMPRESSION: Unremarkable portable chest x-ray. Echocardiogram results:  Results for orders placed or performed during the hospital encounter of 18   2D ECHO COMPLETE ADULT (TTE) W OR WO CONTR    Narrative    David  [address] [facility], [address] [facility]  [phone]    Transthoracic Echocardiogram  2D, M-mode, Doppler, and Color Doppler    Patient: Dwayne Pedraza  MR #: [id number]  : 1955  Age: 70 years  Gender: Male  Study date: 14-Sep-2018  Account #: [de-identified]  Height: 69 in  Weight: 224.6 lb  BSA: 2.17 mï¾²  Status:Routine  Location: 359  BP: 89/ 46    Allergies: NO KNOWN ALLERGIES    Sonographer:  Franklyn Orozco [facility]  Group:  [facility]  Referring Physician:  Lakshmi Rajput MD  Reading Physician:  Cedrick Guthrie. Jona Subramanian MD [facility]    INDICATIONS: TIA with transient neurological disturbance. PROCEDURE: This was a routine study. A transthoracic echocardiogram was  performed. The study included complete 2D imaging, M-mode, complete spectral  Doppler, and color Doppler. Intravenous contrast (Definity) was administered. Image quality was adequate. LEFT VENTRICLE: Size was normal. Systolic function was normal. Ejection  fraction was estimated in the range of 55 % to 60 %. There were no regional  wall motion abnormalities. Wall thickness was mildly increased. Avg. E/e'=  11.6. Left ventricular diastolic function parameters were normal.    RIGHT VENTRICLE: The size was normal. Systolic function was normal. The  tricuspid jet envelope definition was inadequate for estimation of RV   systolic  pressure.     LEFT ATRIUM: Size was normal.    ATRIAL SEPTUM: Contrast injection was performed. There was no right-to-left  shunt, with provocative maneuvers to increase right atrial pressure. RIGHT ATRIUM: Size was normal.    SYSTEMIC VEINS: IVC: The inferior vena cava was normal in size and course. AORTIC VALVE: The valve was trileaflet. Leaflets exhibited mild sclerosis. There was no evidence for stenosis. There was no insufficiency. MITRAL VALVE: There was mild calcification of the anterior leaflet. There was  no evidence for stenosis. There was no regurgitation. TRICUSPID VALVE: The valve structure was normal. There was no evidence for  stenosis. There was trace regurgitation. PULMONIC VALVE: Not well visualized. There was no evidence for stenosis. There  was trace insufficiency. PERICARDIUM: There was no pericardial effusion. AORTA: The root exhibited dilatation. (4.3cm) There was mild dilatation of   the  ascending aorta. (4.0 cm)    SUMMARY:    -  Left ventricle: Systolic function was normal. Ejection fraction was  estimated in the range of 55 % to 60 %. There were no regional wall motion  abnormalities. Wall thickness was mildly increased. Avg. E/e'= 11.6. Left  ventricular diastolic function parameters were normal.    -  Atrial septum: Contrast injection was performed. There was no   right-to-left  shunt, with provocative maneuvers to increase right atrial pressure. -  Aorta, systemic arteries: There was mild dilatation of the ascending   aorta.   (4.0 cm)    SYSTEM MEASUREMENT TABLES    2D mode  AoR Diam (2D): 4.3 cm  LA Dimension (2D): 3.5 cm  Left Atrium Systolic Volume Index; Method of Disks, Biplane; 2D mode;: 28.6  ml/m2  IVS/LVPW (2D): 1  IVSd (2D): 1.1 cm  LVIDd (2D): 4.2 cm  LVIDs (2D): 2.1 cm  LVOT Area (2D): 4.9 cm2  LVPWd (2D): 1.1 cm  RVIDd (2D): 3.5 cm    Tissue Doppler Imaging  LV Peak Early Monahan Tissue Carlin; Lateral MA (TDI): 9.1 cm/s  LV Peak Early Monahan Tissue Carlin; Medial MA (TDI): 6 cm/s    Unspecified Scan Mode  Peak Grad; Mean; Antegrade Flow: 6 mm[Hg]  Vmax; Antegrade Flow: 123 cm/s  LVOT Diam: 2.5 cm  MV Peak Carlin/LV Peak Tissue Carlin E-Wave; Lateral MA: 9.2  MV Peak Carlin/LV Peak Tissue Carlin E-Wave; Medial MA: 14    Prepared and signed by    Gricel Kaur. [address] MD [facility]  Signed 14-Sep-2018 14:37:09           All Micro Results     None          Labs: Results:       BMP, Mg, Phos Recent Labs      09/18/18   0953  09/16/18   0527   NA   --   137   K   --   3.7   CL   --   102   CO2   --   25   AGAP   --   10   BUN   --   14   CREA  1.06  0.84   CA   --   8.3   GLU   --   136*      CBC Recent Labs      09/16/18   0527   WBC  4.6   RBC  4.17*   HGB  12.6*   HCT  36.3*   PLT  191      LFT No results for input(s): SGOT, ALT, TBIL, AP, TP, ALB, GLOB, AGRAT, GPT in the last 72 hours.    Cardiac Testing Lab Results   Component Value Date/Time    BNP 60 03/19/2015 01:35 PM    Troponin-I, Qt. <0.02 (L) 09/16/2018 05:27 AM    Troponin-I, Qt. <0.02 (L) 09/13/2018 04:00 PM      Coagulation Tests Lab Results   Component Value Date/Time    Prothrombin time 10.1 11/21/2017 06:09 AM    Prothrombin time 10.4 03/19/2015 01:35 PM    INR 1.0 11/21/2017 06:09 AM    INR 1.0 03/19/2015 01:35 PM    INR (POC) 1.0 09/13/2018 03:54 PM    INR (POC) 0.9 11/18/2017 10:37 AM    aPTT 25.2 09/13/2018 04:00 PM    aPTT 24.4 11/21/2017 06:09 AM      A1c Lab Results   Component Value Date/Time    Hemoglobin A1c 14.3 09/14/2018 06:41 AM    Hemoglobin A1c 6.3 (H) 11/19/2017 04:10 AM      Lipid Panel Lab Results   Component Value Date/Time    Cholesterol, total 123 09/14/2018 06:41 AM    HDL Cholesterol 40 09/14/2018 06:41 AM    LDL, calculated 64 09/14/2018 06:41 AM    VLDL, calculated 19 09/14/2018 06:41 AM    Triglyceride 95 09/14/2018 06:41 AM    CHOL/HDL Ratio 3.1 09/14/2018 06:41 AM      Thyroid Panel No results found for: T4, T3U, TSH, TSHEXT     Most Recent UA Lab Results   Component Value Date/Time    Color YELLOW 07/22/2008 12:47 PM Appearance HAZY 07/22/2008 12:47 PM    pH (UA) 6.0 07/22/2008 12:47 PM    Protein TRACE (A) 07/22/2008 12:47 PM    Glucose NEGATIVE  07/22/2008 12:47 PM    Ketone NEGATIVE  07/22/2008 12:47 PM    Bilirubin NEGATIVE  07/22/2008 12:47 PM    Blood LARGE (A) 07/22/2008 12:47 PM    Urobilinogen 0.2 07/22/2008 12:47 PM    Nitrites NEGATIVE  07/22/2008 12:47 PM    Leukocyte Esterase SMALL (A) 07/22/2008 12:47 PM        No Known Allergies  Immunization History   Administered Date(s) Administered    Influenza Vaccine (Quad) PF 11/19/2017    TB Skin Test (PPD) Intradermal 09/13/2018       All Labs from Last 24 Hrs:  Recent Results (from the past 24 hour(s))   GLUCOSE, POC    Collection Time: 09/17/18  4:55 PM   Result Value Ref Range    Glucose (POC) 219 (H) 65 - 100 mg/dL   GLUCOSE, POC    Collection Time: 09/17/18  9:27 PM   Result Value Ref Range    Glucose (POC) 177 (H) 65 - 100 mg/dL   GLUCOSE, POC    Collection Time: 09/18/18  7:08 AM   Result Value Ref Range    Glucose (POC) 132 (H) 65 - 100 mg/dL   CREATININE    Collection Time: 09/18/18  9:53 AM   Result Value Ref Range    Creatinine 1.06 0.8 - 1.5 MG/DL   GLUCOSE, POC    Collection Time: 09/18/18 11:25 AM   Result Value Ref Range    Glucose (POC) 197 (H) 65 - 100 mg/dL       Discharge Exam:  Patient Vitals for the past 24 hrs:   Temp Pulse Resp BP SpO2   09/18/18 1257 97.6 °F (36.4 °C) 61 17 126/83 94 %   09/18/18 0840 97.8 °F (36.6 °C) 75 18 (!) 135/94 95 %   09/18/18 0400 98 °F (36.7 °C) 75 18 159/90 95 %   09/18/18 0012 98.4 °F (36.9 °C) 78 16 154/86 95 %   09/17/18 2058 - - - - 97 %   09/17/18 1956 97.8 °F (36.6 °C) 74 16 150/90 95 %   09/17/18 1657 97.5 °F (36.4 °C) 76 18 (!) 135/96 95 %     Oxygen Therapy  O2 Sat (%): 94 % (09/18/18 1257)  Pulse via Oximetry: 84 beats per minute (09/17/18 2058)  O2 Device: Room air (09/17/18 2058)  O2 Flow Rate (L/min): 0 l/min (09/17/18 2058)  FIO2 (%): 21 % (09/17/18 2058)    Intake/Output Summary (Last 24 hours) at 09/18/18 1508  Last data filed at 09/18/18 0400   Gross per 24 hour   Intake                0 ml   Output             1000 ml   Net            -1000 ml       General:    Well nourished. Alert. No distress. Eyes:   Normal sclera. Extraocular movements intact. ENT:  Normocephalic, atraumatic. Moist mucous membranes  CV:   Regular rate and rhythm. No murmur, rub, or gallop. Lungs:  Clear to auscultation bilaterally. No wheezing, rhonchi, or rales. Abdomen: Soft, nontender, nondistended. Bowel sounds normal.   Extremities: Warm and dry. No cyanosis or edema. Mild pain moving left upper extremity above shoulder level. Non tender on palpation. Neurologic: CN II-XII grossly intact. Neuropathy changes feet. Skin:     No rashes or jaundice. Psych:  Normal mood and affect. Discharge Info:   Current Discharge Medication List      START taking these medications    Details   gabapentin (NEURONTIN) 300 mg capsule Take 1 Cap by mouth daily. 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 Qty: 1 Vial, Refills: 1      insulin lispro (HUMALOG) 100 unit/mL injection Continue sliding scale  Qty: 1 Vial, Refills: 0         CONTINUE these medications which have NOT CHANGED    Details   atorvastatin (LIPITOR) 40 mg tablet Take 40 mg by mouth nightly. aspirin delayed-release 81 mg tablet Take 81 mg by mouth daily. diazePAM (VALIUM) 5 mg tablet Take 5 mg by mouth two (2) times daily as needed for Anxiety. multivitamin, tx-iron-ca-min (THERA-M W/ IRON) 9 mg iron-400 mcg tab tablet Take 1 Tab by mouth daily. baclofen (LIORESAL) 10 mg tablet Take 1 Tab by mouth three (3) times daily. Qty: 15 Tab, Refills: 0      valsartan 320 mg tab 320 mg, hydroCHLOROthiazide 25 mg tab 25 mg Take 1 Dose by mouth daily. levothyroxine (SYNTHROID) 75 mcg tablet Take 75 mcg by mouth Daily (before breakfast). escitalopram oxalate (LEXAPRO) 10 mg tablet Take 20 mg by mouth daily. Disposition: Home  Activity: As tolerated. Diet: DIET DIABETIC CONSISTENT CARB Regular and 2gm low sodium. Follow-up Appointments   Procedures    FOLLOW UP VISIT Appointment in: One Week F/u with pcp in a week with bmp. Advised to drink atleast 2 liters of fluids/day. F/u with Neurology was per there recommendations. Diabetic and 2 gm low sodium diet. Advised to keep a log of all finger sti. .. F/u with pcp in a week with bmp. Advised to drink atleast 2 liters of fluids/day. F/u with Neurology was per there recommendations. Diabetic and 2 gm low sodium diet. Advised to keep a log of all finger stick glucose at home and show it to pcp at follow up. Out pt referral for therapy has been ordered by . Standing Status:   Standing     Number of Occurrences:   1     Order Specific Question:   Appointment in     Answer: One Week     Take blood pressure medication if systolic greater then 859 mmhg systolic. F/u with orthopedic physician as out pt for left shoulder arthritis. Follow-up Information     Follow up With Details Dayday Herrera MD   [address]  [phone]            Time spent in patient discharge planning and coordination 35 minutes.     Signed:  Jeff Muñiz MD

## 2018-09-20 ENCOUNTER — PATIENT OUTREACH (OUTPATIENT)
Dept: CASE MANAGEMENT | Age: 63
End: 2018-09-20

## 2018-09-20 NOTE — Clinical Note
Patient was diagnosed with DM in IP admission. Patient has 18yr old Diabetic daughter that resides with him and supports him as best she can. Patient also has HX of Stroke. Coordinator believes patient could benefit from [address] [place] [address] for services and management and support to potentially decrease readmission. Patient agreed. Will seek assignment form Supervisor.  Ty.

## 2018-09-20 NOTE — PROGRESS NOTES
Transition of Care Discharge Follow-up Questionnaire Date/Time of TANMAY Outreach: 9/19/18 2:13 PM  
What was the patient hospitalized for? Patient was hospitalized for Diabetic amyotrophy associated with type 2 diabetes mellitus Does the patient understand his/her diagnosis and/or treatment and what happened during the hospitalization? CM Assessed Risk for Readmission: 
 
 
 
Patient stated Risk for Readmission: 
 
 Spoke to Patient who consented to [facility] outreach in presence of patient, and verbalized understanding of treatment and diagnosis. Risk for Readmission completed and assessed and Care Coordinator assessed risk would be due to NEW diagnosis, management, supports. Patient was diagnosed with DM in IP admission. Patient has 18yr old Diabetic daughter that resides with him and supports him as best she can. Patient also has HX of Stroke. Coordinator believes patient could benefit from [address] [place] [address] for services and management and support to potentially decrease readmission. Patient agreed. Will seek assignment form Supervisor. Did the patient receive discharge instructions? Patient states d/c instructions received. Review any discharge instructions (see notes in [facility]). Ask patient if they understand these. Do they have any questions? Patient discussed discharge plan and instruction as Patient understood it to be with Care Coordinator. Which I have documented in the pertinent areas throughout this progress note. Were home services ordered (nursing, PT, OT, ST, etc.)? No HH ordered at d/c. If so, has the first visit occurred? If not, why? (Assist with coordination of services if necessary.) N/A Was any DME ordered? No DME ordered at d/c. Please note per CC patient was to obtain and begin a sliding scale and Blood Sugar log per patient and CC.   Patient states he has not, but has left a detailed message for Sharon discussing the needs and lack thereof. Care Coordinator will also inform CCM to f/u in this regard. Patient aware. If so, has it been received? If not, why?  (Assist with coordination of arranging DME orders if necessary.) N/A Complete a review of all medications (new, continued and discontinued meds per the D/C instructions and medication tab in [address] [facility]). Review of all meds completed with Patient and Care Coordinator. Lantus, Humalog, Neurontin, prescribed at d/c. Were all new prescriptions filled? If not, why?  (Assist with obtainment of medications if necessary.) Yes. Does the patient understand the purpose and dosing instructions for all medications? (If patient has questions, provide explanation and education.) Indicated by Patient to Care Coordinator, the purpose and dosing instructions for all medications were understood. Please note info stated above regarding sliding scale. Does the patient have any problems in performing ADLs? (If patient is unable to perform ADLs  what is the limiting factor(s)? Do they have a support system that can assist? If no support system is present, discuss possible assistance that they may be able to obtain.) Patient states he is independent with all ADLs. Does the patient have all follow-up appointments scheduled? 7 day f/up with PCP? 
 
7-14 day f/up with specialist? 
 
If f/up has not been made  what actions has the care coordinator made to accomplish this? Has transportation been arranged? [facility]monary follow-up should be within 7 days of discharge; all others should have PCP follow-up within 7 days of discharge; follow-ups with other specialists as appropriate or ordered.) PCP f/u appt. 9/25. Neuro- still awaiting call from office. Will inform CCM. Patient denies need for transportation.  
 
Please note patient was extremely grateful for the care and service that was given to him during his stay. Patient states it made his visit that much more easier. Schedule next appointment with YAMILETH GRANADO Coordinator or refer to RN Case Manager/  per the workflow guidelines. Referral to [address] [place] [address] will no longer be engaged. Supervisor will assign to appropriate CCM. Any other questions or concerns expressed by the patient? Gratitude stated and no further questions asked or needs identified. TANMAY Call Completed By:  
Celeste Smallwood LPN/ Care Coordinator [facility]FQFEA:[phone] David [address] [place] [address] 
[email or web address] This note will not be viewable in [address]

## 2019-08-27 ENCOUNTER — APPOINTMENT (OUTPATIENT)
Dept: GENERAL RADIOLOGY | Age: 64
End: 2019-08-27
Attending: EMERGENCY MEDICINE
Payer: MEDICARE

## 2019-08-27 ENCOUNTER — HOSPITAL ENCOUNTER (OUTPATIENT)
Age: 64
Setting detail: OBSERVATION
Discharge: HOME | End: 2019-08-28
Attending: EMERGENCY MEDICINE | Admitting: INTERNAL MEDICINE
Payer: MEDICARE

## 2019-08-27 ENCOUNTER — APPOINTMENT (OUTPATIENT)
Dept: CT IMAGING | Age: 64
End: 2019-08-27
Attending: EMERGENCY MEDICINE
Payer: MEDICARE

## 2019-08-27 ENCOUNTER — APPOINTMENT (OUTPATIENT)
Dept: ULTRASOUND IMAGING | Age: 64
End: 2019-08-27
Attending: FAMILY MEDICINE
Payer: MEDICARE

## 2019-08-27 DIAGNOSIS — R20.0 NUMBNESS AND TINGLING IN LEFT ARM: Primary | ICD-10-CM

## 2019-08-27 DIAGNOSIS — R20.2 NUMBNESS AND TINGLING IN LEFT ARM: Primary | ICD-10-CM

## 2019-08-27 DIAGNOSIS — R20.0 LEFT FACIAL NUMBNESS: ICD-10-CM

## 2019-08-27 DIAGNOSIS — R07.89 CHEST WALL PAIN: ICD-10-CM

## 2019-08-27 PROBLEM — R29.90 STROKE-LIKE EPISODE: Status: RESOLVED | Noted: 2018-09-13 | Resolved: 2019-08-27

## 2019-08-27 PROBLEM — R29.90 STROKE-LIKE SYMPTOMS: Status: ACTIVE | Noted: 2019-08-27

## 2019-08-27 PROBLEM — R73.9 HYPERGLYCEMIA: Status: RESOLVED | Noted: 2018-09-13 | Resolved: 2019-08-27

## 2019-08-27 PROBLEM — R53.1 LEFT-SIDED WEAKNESS: Status: ACTIVE | Noted: 2019-08-27

## 2019-08-27 PROBLEM — E11.9 DM TYPE 2 (DIABETES MELLITUS, TYPE 2) (HCC): Status: ACTIVE | Noted: 2019-08-27

## 2019-08-27 LAB
ALBUMIN SERPL-MCNC: 3.8 G/DL (ref 3.2–4.6)
ALBUMIN/GLOB SERPL: 0.9 {RATIO} (ref 1.2–3.5)
ALP SERPL-CCNC: 111 U/L (ref 50–136)
ALT SERPL-CCNC: 31 U/L (ref 12–65)
ANION GAP SERPL CALC-SCNC: 5 MMOL/L (ref 7–16)
APTT PPP: 26.7 SEC (ref 24.7–39.8)
AST SERPL-CCNC: 27 U/L (ref 15–37)
ATRIAL RATE: 96 BPM
BASOPHILS # BLD: 0.1 K/UL (ref 0–0.2)
BASOPHILS NFR BLD: 1 % (ref 0–2)
BILIRUB SERPL-MCNC: 0.4 MG/DL (ref 0.2–1.1)
BUN SERPL-MCNC: 15 MG/DL (ref 8–23)
CALCIUM SERPL-MCNC: 9.5 MG/DL (ref 8.3–10.4)
CALCULATED P AXIS, ECG09: 44 DEGREES
CALCULATED R AXIS, ECG10: -15 DEGREES
CALCULATED T AXIS, ECG11: 29 DEGREES
CHLORIDE SERPL-SCNC: 101 MMOL/L (ref 98–107)
CO2 SERPL-SCNC: 30 MMOL/L (ref 21–32)
CREAT SERPL-MCNC: 1.14 MG/DL (ref 0.8–1.5)
DIAGNOSIS, 93000: NORMAL
DIFFERENTIAL METHOD BLD: NORMAL
EOSINOPHIL # BLD: 0.2 K/UL (ref 0–0.8)
EOSINOPHIL NFR BLD: 3 % (ref 0.5–7.8)
ERYTHROCYTE [DISTWIDTH] IN BLOOD BY AUTOMATED COUNT: 12.9 % (ref 11.9–14.6)
GLOBULIN SER CALC-MCNC: 4.1 G/DL (ref 2.3–3.5)
GLUCOSE BLD STRIP.AUTO-MCNC: 109 MG/DL (ref 65–100)
GLUCOSE BLD STRIP.AUTO-MCNC: 132 MG/DL (ref 65–100)
GLUCOSE SERPL-MCNC: 171 MG/DL (ref 65–100)
HCT VFR BLD AUTO: 46.9 % (ref 41.1–50.3)
HGB BLD-MCNC: 16.3 G/DL (ref 13.6–17.2)
IMM GRANULOCYTES # BLD AUTO: 0 K/UL (ref 0–0.5)
IMM GRANULOCYTES NFR BLD AUTO: 0 % (ref 0–5)
INR BLD: 0.9 (ref 0.9–1.2)
LYMPHOCYTES # BLD: 2.8 K/UL (ref 0.5–4.6)
LYMPHOCYTES NFR BLD: 30 % (ref 13–44)
MCH RBC QN AUTO: 31.5 PG (ref 26.1–32.9)
MCHC RBC AUTO-ENTMCNC: 34.8 G/DL (ref 31.4–35)
MCV RBC AUTO: 90.7 FL (ref 79.6–97.8)
MONOCYTES # BLD: 0.7 K/UL (ref 0.1–1.3)
MONOCYTES NFR BLD: 8 % (ref 4–12)
NEUTS SEG # BLD: 5.5 K/UL (ref 1.7–8.2)
NEUTS SEG NFR BLD: 59 % (ref 43–78)
NRBC # BLD: 0 K/UL (ref 0–0.2)
P-R INTERVAL, ECG05: 176 MS
PLATELET # BLD AUTO: 289 K/UL (ref 150–450)
PMV BLD AUTO: 11.3 FL (ref 9.4–12.3)
POTASSIUM SERPL-SCNC: 4.3 MMOL/L (ref 3.5–5.1)
PROT SERPL-MCNC: 7.9 G/DL (ref 6.3–8.2)
PT BLD: 11 SECS (ref 9.6–11.6)
Q-T INTERVAL, ECG07: 346 MS
QRS DURATION, ECG06: 100 MS
QTC CALCULATION (BEZET), ECG08: 437 MS
RBC # BLD AUTO: 5.17 M/UL (ref 4.23–5.6)
SODIUM SERPL-SCNC: 136 MMOL/L (ref 136–145)
TROPONIN I BLD-MCNC: 0 NG/ML (ref 0.02–0.05)
TROPONIN I SERPL-MCNC: <0.02 NG/ML (ref 0.02–0.05)
VENTRICULAR RATE, ECG03: 96 BPM
WBC # BLD AUTO: 9.3 K/UL (ref 4.3–11.1)

## 2019-08-27 PROCEDURE — 99285 EMERGENCY DEPT VISIT HI MDM: CPT | Performed by: EMERGENCY MEDICINE

## 2019-08-27 PROCEDURE — 99218 HC RM OBSERVATION: CPT

## 2019-08-27 PROCEDURE — 85025 COMPLETE CBC W/AUTO DIFF WBC: CPT

## 2019-08-27 PROCEDURE — 85610 PROTHROMBIN TIME: CPT

## 2019-08-27 PROCEDURE — 74011250637 HC RX REV CODE- 250/637: Performed by: FAMILY MEDICINE

## 2019-08-27 PROCEDURE — 84484 ASSAY OF TROPONIN QUANT: CPT

## 2019-08-27 PROCEDURE — 96372 THER/PROPH/DIAG INJ SC/IM: CPT

## 2019-08-27 PROCEDURE — 74011250636 HC RX REV CODE- 250/636: Performed by: FAMILY MEDICINE

## 2019-08-27 PROCEDURE — 71045 X-RAY EXAM CHEST 1 VIEW: CPT

## 2019-08-27 PROCEDURE — 80053 COMPREHEN METABOLIC PANEL: CPT

## 2019-08-27 PROCEDURE — 74011250637 HC RX REV CODE- 250/637: Performed by: EMERGENCY MEDICINE

## 2019-08-27 PROCEDURE — 70450 CT HEAD/BRAIN W/O DYE: CPT

## 2019-08-27 PROCEDURE — 82962 GLUCOSE BLOOD TEST: CPT

## 2019-08-27 PROCEDURE — 93005 ELECTROCARDIOGRAM TRACING: CPT | Performed by: EMERGENCY MEDICINE

## 2019-08-27 PROCEDURE — 85730 THROMBOPLASTIN TIME PARTIAL: CPT

## 2019-08-27 RX ORDER — SODIUM CHLORIDE 0.9 % (FLUSH) 0.9 %
5-40 SYRINGE (ML) INJECTION EVERY 8 HOURS
Status: DISCONTINUED | OUTPATIENT
Start: 2019-08-27 | End: 2019-08-28 | Stop reason: HOSPADM

## 2019-08-27 RX ORDER — ESCITALOPRAM OXALATE 10 MG/1
20 TABLET ORAL DAILY
Status: DISCONTINUED | OUTPATIENT
Start: 2019-08-28 | End: 2019-08-28

## 2019-08-27 RX ORDER — ENOXAPARIN SODIUM 100 MG/ML
40 INJECTION SUBCUTANEOUS EVERY 12 HOURS
Status: DISCONTINUED | OUTPATIENT
Start: 2019-08-28 | End: 2019-08-28 | Stop reason: HOSPADM

## 2019-08-27 RX ORDER — DIAZEPAM 5 MG/1
5 TABLET ORAL
Status: DISCONTINUED | OUTPATIENT
Start: 2019-08-27 | End: 2019-08-28 | Stop reason: HOSPADM

## 2019-08-27 RX ORDER — ATORVASTATIN CALCIUM 40 MG/1
40 TABLET, FILM COATED ORAL
Status: DISCONTINUED | OUTPATIENT
Start: 2019-08-27 | End: 2019-08-28 | Stop reason: HOSPADM

## 2019-08-27 RX ORDER — SODIUM CHLORIDE 0.9 % (FLUSH) 0.9 %
5-40 SYRINGE (ML) INJECTION AS NEEDED
Status: DISCONTINUED | OUTPATIENT
Start: 2019-08-27 | End: 2019-08-28 | Stop reason: HOSPADM

## 2019-08-27 RX ORDER — LEVOTHYROXINE SODIUM 75 UG/1
75 TABLET ORAL
Status: DISCONTINUED | OUTPATIENT
Start: 2019-08-28 | End: 2019-08-28 | Stop reason: HOSPADM

## 2019-08-27 RX ORDER — CYCLOBENZAPRINE HCL 10 MG
TABLET ORAL
COMMUNITY

## 2019-08-27 RX ORDER — PREGABALIN 300 MG/1
300 CAPSULE ORAL 2 TIMES DAILY
COMMUNITY

## 2019-08-27 RX ORDER — HYDROCHLOROTHIAZIDE 25 MG/1
25 TABLET ORAL DAILY
Status: DISCONTINUED | OUTPATIENT
Start: 2019-08-28 | End: 2019-08-28 | Stop reason: HOSPADM

## 2019-08-27 RX ORDER — GABAPENTIN 300 MG/1
300 CAPSULE ORAL
Status: COMPLETED | OUTPATIENT
Start: 2019-08-27 | End: 2019-08-27

## 2019-08-27 RX ORDER — CLOPIDOGREL BISULFATE 75 MG/1
75 TABLET ORAL DAILY
Status: DISCONTINUED | OUTPATIENT
Start: 2019-08-28 | End: 2019-08-28 | Stop reason: HOSPADM

## 2019-08-27 RX ORDER — GABAPENTIN 300 MG/1
300 CAPSULE ORAL DAILY
Status: DISCONTINUED | OUTPATIENT
Start: 2019-08-28 | End: 2019-08-28

## 2019-08-27 RX ORDER — VORTIOXETINE 10 MG/1
10 TABLET, FILM COATED ORAL DAILY
COMMUNITY

## 2019-08-27 RX ORDER — INSULIN GLARGINE 100 [IU]/ML
30 INJECTION, SOLUTION SUBCUTANEOUS
Status: DISCONTINUED | OUTPATIENT
Start: 2019-08-27 | End: 2019-08-28 | Stop reason: HOSPADM

## 2019-08-27 RX ORDER — BACLOFEN 10 MG/1
10 TABLET ORAL 3 TIMES DAILY
Status: DISCONTINUED | OUTPATIENT
Start: 2019-08-27 | End: 2019-08-28

## 2019-08-27 RX ORDER — VALSARTAN 320 MG/1
320 TABLET ORAL DAILY
Status: DISCONTINUED | OUTPATIENT
Start: 2019-08-28 | End: 2019-08-28 | Stop reason: HOSPADM

## 2019-08-27 RX ADMIN — GABAPENTIN 300 MG: 300 CAPSULE ORAL at 20:42

## 2019-08-27 RX ADMIN — ATORVASTATIN CALCIUM 40 MG: 40 TABLET, FILM COATED ORAL at 23:44

## 2019-08-27 RX ADMIN — ENOXAPARIN SODIUM 40 MG: 40 INJECTION SUBCUTANEOUS at 23:44

## 2019-08-27 RX ADMIN — DIAZEPAM 5 MG: 5 TABLET ORAL at 23:44

## 2019-08-27 RX ADMIN — Medication 5 ML: at 22:50

## 2019-08-27 NOTE — H&P
HOSPITALIST INITIAL HISTORY AND PHYSICAL    NAME:  Josie Paredes   Age:  75 y.o.  :   1955   MRN:   [id number]  PCP: Helen Fuller MD  Consulting MD:  Treatment Team: Attending Provider: Lisa lAlison MD; Primary Nurse: Karl Kuhn RN    CHIEF COMPLAINT: left sided weakness/nubmness    HISTORY OF PRESENT ILLNESS:   Josie Paredes is a 75 y.o. male with a past medical history of previous CVA, DM type II, HTN who presents to the ER with report of left-sided numbness over his face, left arm, and left leg, first noticed around 0800 upon waking up this morning. He reports that these symptoms have been present for years since his last stroke but that they were significantly worse upon awakening this morning. He also complains of regurgitating undigested food several times over the past 2-3 days    REVIEW OF SYSTEMS: Comprehensive ROS performed. Denies fevers, chills, nausea, vomiting, otherwise negative. Past Medical History:   Diagnosis Date    Chronic kidney disease     kidney stone    Hypertension     Major depression     Stroke ([facility] [address] 2017    Left sided weakness/numbness, artherosclerotic thickening & calcified plaques    Thyroid disease         No past surgical history on file. Prior to Admission Medications   Prescriptions Last Dose Informant Patient Reported? Taking?   aspirin delayed-release 81 mg tablet   Yes No   Sig: Take 81 mg by mouth daily. atorvastatin (LIPITOR) 40 mg tablet   Yes No   Sig: Take 40 mg by mouth nightly. baclofen (LIORESAL) 10 mg tablet   No No   Sig: Take 1 Tab by mouth three (3) times daily. Patient taking differently: Take 20 mg by mouth four (4) times daily. diazePAM (VALIUM) 5 mg tablet   Yes No   Sig: Take 5 mg by mouth two (2) times daily as needed for Anxiety. escitalopram oxalate (LEXAPRO) 10 mg tablet   Yes No   Sig: Take 20 mg by mouth daily.    gabapentin (NEURONTIN) 300 mg capsule   No No   Sig: Take 1 Cap by mouth daily. Indications: NEUROPATHIC PAIN   insulin glargine (LANTUS) 100 unit/mL injection   No No   Si Units by SubCUTAneous route nightly. insulin lispro (HUMALOG) 100 unit/mL injection   No No   Sig: Continue sliding scale   levothyroxine (SYNTHROID) 75 mcg tablet   Yes No   Sig: Take 75 mcg by mouth Daily (before breakfast). multivitamin, tx-iron-ca-min (THERA-M W/ IRON) 9 mg iron-400 mcg tab tablet   Yes No   Sig: Take 1 Tab by mouth daily. valsartan 320 mg tab 320 mg, hydroCHLOROthiazide 25 mg tab 25 mg   Yes No   Sig: Take 1 Dose by mouth daily. Facility-Administered Medications: None       No Known Allergies    FAMILY HISTORY: Reviewed. Negative except   Family History   Problem Relation Age of Onset    Heart Disease Mother     Dementia Father        Social History     Tobacco Use    Smoking status: Never Smoker    Smokeless tobacco: Current User     Types: Chew   Substance Use Topics    Alcohol use: No     Comment: nightly, 1-2 bottles         Objective:     Visit Vitals  /75   Pulse (!) 103   Temp 97.6 °F (36.4 °C)   Resp 12   SpO2 95%      Temp (24hrs), Av.6 °F (36.4 °C), Min:97.6 °F (36.4 °C), Max:97.6 °F (36.4 °C)    Oxygen Therapy  O2 Sat (%): 95 % (19 1800)  Pulse via Oximetry: 103 beats per minute (19 1800)  Physical Exam:  General:    The patient is a pleasant elderly male in no acute distress. Head:   Normocephalic/atraumatic. Eyes:  No palpebral pallor or scleral icterus. ENT:  External auricular and nasal exam within normal limits. Mucous membranes are moist.  Neck:  Supple, non-tender, no JVD. Lungs:   Clear to auscultation bilaterally without wheezes or crackles. No respiratory distress or accessory muscle use. Heart:   Regular rate and rhythm, without murmurs, rubs, or gallops. Abdomen:   Soft, non-tender, non-distended with normoactive bowel sounds. Genitourinary: No tenderness over the bladder or bilateral CVAs.   Extremities: Without clubbing, cyanosis, or edema. Skin:     Normal color, texture, and turgor. No rashes, lesions, or jaundice. Pulses: Radial and dorsalis pedis pulses present 2+ bilaterally. Capillary refill <2s. Neurologic: CN II-XII grossly intact and symmetrical. Light touch paresthesia over left face, shoulder, chest, arm, left lateral leg. 5/5 BUE strength proximal/distally. 5/5 RLE strength, 4+/5 LLE strength proximally. Psychiatric: Anxious. Alert and oriented x 3    Data Review:   Recent Results (from the past 24 hour(s))   EKG, 12 LEAD, INITIAL    Collection Time: 08/27/19  5:28 PM   Result Value Ref Range    Ventricular Rate 96 BPM    Atrial Rate 96 BPM    P-R Interval 176 ms    QRS Duration 100 ms    Q-T Interval 346 ms    QTC Calculation (Bezet) 437 ms    Calculated P Axis 44 degrees    Calculated R Axis -15 degrees    Calculated T Axis 29 degrees    Diagnosis       Normal sinus rhythm  Low voltage QRS  Cannot rule out Anterior infarct , age undetermined  Abnormal ECG  When compared with ECG of 16-SEP-2018 17:53,  No significant change was found     GLUCOSE, POC    Collection Time: 08/27/19  5:33 PM   Result Value Ref Range    Glucose (POC) 109 (H) 65 - 100 mg/dL   POC TROPONIN-I    Collection Time: 08/27/19  5:33 PM   Result Value Ref Range    Troponin-I (POC) 0 (L) 0.02 - 0.05 ng/ml   CBC WITH AUTOMATED DIFF    Collection Time: 08/27/19  5:35 PM   Result Value Ref Range    WBC 9.3 4.3 - 11.1 K/uL    RBC 5.17 4.23 - 5.6 M/uL    HGB 16.3 13.6 - 17.2 g/dL    HCT 46.9 41.1 - 50.3 %    MCV 90.7 79.6 - 97.8 FL    MCH 31.5 26.1 - 32.9 PG    MCHC 34.8 31.4 - 35.0 g/dL    RDW 12.9 11.9 - 14.6 %    PLATELET 436 480 - 270 K/uL    MPV 11.3 9.4 - 12.3 FL    ABSOLUTE NRBC 0.00 0.0 - 0.2 K/uL    DF AUTOMATED      NEUTROPHILS 59 43 - 78 %    LYMPHOCYTES 30 13 - 44 %    MONOCYTES 8 4.0 - 12.0 %    EOSINOPHILS 3 0.5 - 7.8 %    BASOPHILS 1 0.0 - 2.0 %    IMMATURE GRANULOCYTES 0 0.0 - 5.0 %    ABS.  NEUTROPHILS 5.5 1.7 - 8.2 K/UL ABS. LYMPHOCYTES 2.8 0.5 - 4.6 K/UL    ABS. MONOCYTES 0.7 0.1 - 1.3 K/UL    ABS. EOSINOPHILS 0.2 0.0 - 0.8 K/UL    ABS. BASOPHILS 0.1 0.0 - 0.2 K/UL    ABS. IMM. GRANS. 0.0 0.0 - 0.5 K/UL   METABOLIC PANEL, COMPREHENSIVE    Collection Time: 08/27/19  5:35 PM   Result Value Ref Range    Sodium 136 136 - 145 mmol/L    Potassium 4.3 3.5 - 5.1 mmol/L    Chloride 101 98 - 107 mmol/L    CO2 30 21 - 32 mmol/L    Anion gap 5 (L) 7 - 16 mmol/L    Glucose 171 (H) 65 - 100 mg/dL    BUN 15 8 - 23 MG/DL    Creatinine 1.14 0.8 - 1.5 MG/DL    GFR est AA >60 >60 ml/min/1.73m2    GFR est non-AA >60 >60 ml/min/1.73m2    Calcium 9.5 8.3 - 10.4 MG/DL    Bilirubin, total 0.4 0.2 - 1.1 MG/DL    ALT (SGPT) 31 12 - 65 U/L    AST (SGOT) 27 15 - 37 U/L    Alk. phosphatase 111 50 - 136 U/L    Protein, total 7.9 6.3 - 8.2 g/dL    Albumin 3.8 3.2 - 4.6 g/dL    Globulin 4.1 (H) 2.3 - 3.5 g/dL    A-G Ratio 0.9 (L) 1.2 - 3.5     TROPONIN I    Collection Time: 08/27/19  5:35 PM   Result Value Ref Range    Troponin-I, Qt. <0.02 (L) 0.02 - 0.05 NG/ML   PTT    Collection Time: 08/27/19  5:35 PM   Result Value Ref Range    aPTT 26.7 24.7 - 39.8 SEC   POC PT/INR    Collection Time: 08/27/19  5:36 PM   Result Value Ref Range    Prothrombin time (POC) 11.0 9.6 - 11.6 SECS    INR (POC) 0.9 0.9 - 1.2         Imaging /Procedures /Studies:  Ct Head Wo Cont    Result Date: 8/27/2019  Impression:  No acute intracranial abnormality. Xr Chest Port    Result Date: 8/27/2019  Impression: Unremarkable portable chest radiograph. Assessment and Plan:     Principal Problem:    Stroke-like symptoms (8/27/2019)    Evaluated by tele-neuro, did not recommend tPA. CT head negative. Will observe on remote tele, TTE, MRI brain, carotid dopplers tomorrow. Switch ASA to Plavix. Continue Lipitor 40. Lovenox. PT/OT/Neuro/Rehab consults. Active Problems:    Left-sided weakness (8/27/2019)    Per above. Hypertension (11/18/2017)    Stable.  Continue home meds.      DM type 2 (diabetes mellitus, type 2) ([facility] [address] (8/27/2019)    Stable. Continue home meds. SSI      Acquired hypothyroidism (11/18/2017)    Stable. Continue home meds. Hyperlipidemia (11/19/2017)    Stable. Continue home meds. [facility] (11/21/2017)    Hx of CVA per above. Major depression (9/13/2018)    Stable. Continue home meds. DVT Prophylaxis: Lovenox      Code Status: FULL CODE      Disposition: Observe on remote tele for evaluation and treatment as per above.       Anticipated discharge: < 2 midnights     Signed By: Karo Ulloa MD     August 27, 2019

## 2019-08-27 NOTE — ED PROVIDER NOTES
58-year-old gentleman has a history of hypertension diabetes and a stroke about 2 years ago resulted in some left-sided numbness and weakness. Usual state of health last night when he went to bed but woke up today about 9 hours ago with worse numbness the left face left arm and into the left leg. Feels like he is weak in his left arm and feels like the left face is somewhat weak as well. Slightly different speech. No change in vision or swallowing. He does have some pain in the left side of his chest that hurts to move or bend or cough. Just noticed this today as well. No trauma or rash. The history is provided by the patient. Numbness   This is a new problem. The current episode started 6 to 12 hours ago. The problem has not changed since onset. There was left upper extremity, left lower extremity and left facial focality noted. Primary symptoms include focal weakness, loss of sensation and speech difficulty. Pertinent negatives include no loss of balance and no slurred speech. There has been no fever. Associated symptoms include chest pain. Pertinent negatives include no shortness of breath, no vomiting, no headaches and no nausea. Past Medical History:   Diagnosis Date    Chronic kidney disease     kidney stone    Hypertension     Major depression     Stroke ([facility] [address] 11/2017    Left sided weakness/numbness, artherosclerotic thickening & calcified plaques    Thyroid disease        No past surgical history on file.       Family History:   Problem Relation Age of Onset    Heart Disease Mother     Dementia Father        Social History     Socioeconomic History    Marital status:      Spouse name: Not on file    Number of children: Not on file    Years of education: Not on file    Highest education level: Not on file   Occupational History    Not on file   Social Needs    Financial resource strain: Not on file    Food insecurity:     Worry: Not on file     Inability: Not on file   [place] Transportation needs:     Medical: Not on file     Non-medical: Not on file   Tobacco Use    Smoking status: Never Smoker    Smokeless tobacco: Current User     Types: Chew   Substance and Sexual Activity    Alcohol use: No     Comment: nightly, 1-2 bottles    Drug use: No    Sexual activity: Never   Lifestyle    Physical activity:     Days per week: Not on file     Minutes per session: Not on file    Stress: Not on file   Relationships    Social connections:     Talks on phone: Not on file     Gets together: Not on file     Attends Ten[employer] service: Not on file     Active member of club or organization: Not on file     Attends meetings of clubs or organizations: Not on file     Relationship status: Not on file    Intimate partner violence:     Fear of current or ex partner: Not on file     Emotionally abused: Not on file     Physically abused: Not on file     Forced sexual activity: Not on file   Other Topics Concern    Not on file   Social History Narrative    Not on file         ALLERGIES: Patient has no known allergies. Review of Systems   Constitutional: Negative for chills and fever. Respiratory: Negative for cough and shortness of breath. Cardiovascular: Positive for chest pain. Negative for palpitations. Gastrointestinal: Negative for abdominal pain, diarrhea, nausea and vomiting. Genitourinary: Negative for dysuria and flank pain. Musculoskeletal: Negative for back pain and neck pain. Skin: Negative for color change and rash. Neurological: Positive for focal weakness, speech difficulty and numbness. Negative for syncope, headaches and loss of balance. All other systems reviewed and are negative. There were no vitals filed for this visit. Physical Exam   Constitutional: He is oriented to person, place, and time. He appears well-developed and well-nourished. No distress. HENT:   Head: Normocephalic and atraumatic.    Right Ear: External ear normal.   Left Ear: External ear normal.   Mouth/Throat: Oropharynx is clear and moist. No oropharyngeal exudate. Eyes: Pupils are equal, round, and reactive to light. Conjunctivae and EOM are normal.   Neck: Normal range of motion. Neck supple. Cardiovascular: Normal rate, regular rhythm and intact distal pulses. No murmur heard. Pulmonary/Chest: Breath sounds normal. No respiratory distress. He exhibits tenderness. He exhibits no crepitus. Neurological: He is alert and oriented to person, place, and time. Gait normal. GCS eye subscore is 4. GCS verbal subscore is 5. GCS motor subscore is 6. Nl speech  Subjective decreased sensation left side. Slight drift. Finger-nose testing reasonable. Skin: Skin is warm and dry. Psychiatric: He has a normal mood and affect. His speech is normal.   Nursing note and vitals reviewed. MDM  Number of Diagnoses or Management Options  Diagnosis management comments: Potential extension of previous stroke. Code stroke called.        Amount and/or Complexity of Data Reviewed  Clinical lab tests: ordered and reviewed  Tests in the radiology section of CPT®: ordered and reviewed  Tests in the medicine section of CPT®: ordered and reviewed  Discuss the patient with other providers: yes  Independent visualization of images, tracings, or specimens: yes    Risk of Complications, Morbidity, and/or Mortality  Presenting problems: moderate  Diagnostic procedures: low  Management options: moderate    Patient Progress  Patient progress: stable         Procedures    Results Include:    Recent Results (from the past 24 hour(s))   GLUCOSE, POC    Collection Time: 08/27/19  5:33 PM   Result Value Ref Range    Glucose (POC) 109 (H) 65 - 100 mg/dL   POC TROPONIN-I    Collection Time: 08/27/19  5:33 PM   Result Value Ref Range    Troponin-I (POC) 0 (L) 0.02 - 0.05 ng/ml   CBC WITH AUTOMATED DIFF    Collection Time: 08/27/19  5:35 PM   Result Value Ref Range    WBC 9.3 4.3 - 11.1 K/uL    RBC 5.17 4.23 - 5.6 M/uL    HGB 16.3 13.6 - 17.2 g/dL    HCT 46.9 41.1 - 50.3 %    MCV 90.7 79.6 - 97.8 FL    MCH 31.5 26.1 - 32.9 PG    MCHC 34.8 31.4 - 35.0 g/dL    RDW 12.9 11.9 - 14.6 %    PLATELET 420 842 - 656 K/uL    MPV 11.3 9.4 - 12.3 FL    ABSOLUTE NRBC 0.00 0.0 - 0.2 K/uL    DF AUTOMATED      NEUTROPHILS 59 43 - 78 %    LYMPHOCYTES 30 13 - 44 %    MONOCYTES 8 4.0 - 12.0 %    EOSINOPHILS 3 0.5 - 7.8 %    BASOPHILS 1 0.0 - 2.0 %    IMMATURE GRANULOCYTES 0 0.0 - 5.0 %    ABS. NEUTROPHILS 5.5 1.7 - 8.2 K/UL    ABS. LYMPHOCYTES 2.8 0.5 - 4.6 K/UL    ABS. MONOCYTES 0.7 0.1 - 1.3 K/UL    ABS. EOSINOPHILS 0.2 0.0 - 0.8 K/UL    ABS. BASOPHILS 0.1 0.0 - 0.2 K/UL    ABS. IMM. GRANS. 0.0 0.0 - 0.5 K/UL   METABOLIC PANEL, COMPREHENSIVE    Collection Time: 08/27/19  5:35 PM   Result Value Ref Range    Sodium 136 136 - 145 mmol/L    Potassium 4.3 3.5 - 5.1 mmol/L    Chloride 101 98 - 107 mmol/L    CO2 30 21 - 32 mmol/L    Anion gap 5 (L) 7 - 16 mmol/L    Glucose 171 (H) 65 - 100 mg/dL    BUN 15 8 - 23 MG/DL    Creatinine 1.14 0.8 - 1.5 MG/DL    GFR est AA >60 >60 ml/min/1.73m2    GFR est non-AA >60 >60 ml/min/1.73m2    Calcium 9.5 8.3 - 10.4 MG/DL    Bilirubin, total 0.4 0.2 - 1.1 MG/DL    ALT (SGPT) 31 12 - 65 U/L    AST (SGOT) 27 15 - 37 U/L    Alk. phosphatase 111 50 - 136 U/L    Protein, total 7.9 6.3 - 8.2 g/dL    Albumin 3.8 3.2 - 4.6 g/dL    Globulin 4.1 (H) 2.3 - 3.5 g/dL    A-G Ratio 0.9 (L) 1.2 - 3.5     TROPONIN I    Collection Time: 08/27/19  5:35 PM   Result Value Ref Range    Troponin-I, Qt. <0.02 (L) 0.02 - 0.05 NG/ML   PTT    Collection Time: 08/27/19  5:35 PM   Result Value Ref Range    aPTT 26.7 24.7 - 39.8 SEC   POC PT/INR    Collection Time: 08/27/19  5:36 PM   Result Value Ref Range    Prothrombin time (POC) 11.0 9.6 - 11.6 SECS    INR (POC) 0.9 0.9 - 1.2       Ct Head Wo Cont    Result Date: 8/27/2019  Title:  CT scan of the Head. Indication:  Left-sided numbness and weakness.    Technique:  Axial CT images through the brain were obtained. All CT scans at this facility are performed using dose reduction/dose modulation techniques, as appropriate the performed exam, including the following: Automated Exposure Control; Adjustment of the mA and/or kV according to patient size (this includes techniques or standardized protocols for targeted exams where dose is matched to indication/reason for exam); and Use of Iterative Reconstruction Technique. Comparison:  September 13, 2018. Findings: There is no abnormal intra-axial or extra-axial mass lesion. There is no  midline shift or mass effect. There is no evidence of intracranial hemorrhage. There is no evidence of acute infarction. Visualized paranasal sinuses and mastoid air cells are clear. Impression:  No acute intracranial abnormality. Xr Chest Port    Result Date: 8/27/2019  Portable chest: History: L chest pain. Comparison: September 16, 2018 Findings: A single view of the chest was obtained. Lungs/pleura:No evidence of pleural effusion or consolidation Cardiac/Mediastinum:Normal size cardiac silhouette Vasculature:No vascular congestion Bones:No acute fracture Other: Lung volumes remain somewhat low. Impression: Unremarkable portable chest radiograph. Patient complains of a tingling and burning type pain in the left shoulder and arm. Possibly some residual of a new stroke with paresthesia. Some pain when he moves the shoulder. Possibly a component of bursitis but would not explain worsening facial numbness. I believe chest pain to be musculoskeletal.  Neurology does not feel patient is a TPA candidate.   Discussed with hospitalist regarding admission as the MRI, MRA advised

## 2019-08-28 ENCOUNTER — APPOINTMENT (OUTPATIENT)
Dept: MRI IMAGING | Age: 64
End: 2019-08-28
Attending: FAMILY MEDICINE
Payer: MEDICARE

## 2019-08-28 ENCOUNTER — APPOINTMENT (OUTPATIENT)
Dept: ULTRASOUND IMAGING | Age: 64
End: 2019-08-28
Attending: FAMILY MEDICINE
Payer: MEDICARE

## 2019-08-28 VITALS
HEART RATE: 89 BPM | TEMPERATURE: 97.6 F | HEIGHT: 69 IN | WEIGHT: 284 LBS | DIASTOLIC BLOOD PRESSURE: 77 MMHG | BODY MASS INDEX: 42.06 KG/M2 | OXYGEN SATURATION: 92 % | RESPIRATION RATE: 18 BRPM | SYSTOLIC BLOOD PRESSURE: 111 MMHG

## 2019-08-28 LAB
ANION GAP SERPL CALC-SCNC: 9 MMOL/L (ref 7–16)
BUN SERPL-MCNC: 20 MG/DL (ref 8–23)
CALCIUM SERPL-MCNC: 9.4 MG/DL (ref 8.3–10.4)
CHLORIDE SERPL-SCNC: 103 MMOL/L (ref 98–107)
CHOLEST SERPL-MCNC: 147 MG/DL
CO2 SERPL-SCNC: 25 MMOL/L (ref 21–32)
CREAT SERPL-MCNC: 1.34 MG/DL (ref 0.8–1.5)
ERYTHROCYTE [DISTWIDTH] IN BLOOD BY AUTOMATED COUNT: 12.9 % (ref 11.9–14.6)
EST. AVERAGE GLUCOSE BLD GHB EST-MCNC: 163 MG/DL
GLUCOSE SERPL-MCNC: 175 MG/DL (ref 65–100)
HBA1C MFR BLD: 7.3 %
HCT VFR BLD AUTO: 47.2 % (ref 41.1–50.3)
HDLC SERPL-MCNC: 40 MG/DL (ref 40–60)
HDLC SERPL: 3.7 {RATIO}
HGB BLD-MCNC: 16 G/DL (ref 13.6–17.2)
LDLC SERPL CALC-MCNC: 72.6 MG/DL
LIPID PROFILE,FLP: ABNORMAL
MCH RBC QN AUTO: 30.8 PG (ref 26.1–32.9)
MCHC RBC AUTO-ENTMCNC: 33.9 G/DL (ref 31.4–35)
MCV RBC AUTO: 90.8 FL (ref 79.6–97.8)
NRBC # BLD: 0 K/UL (ref 0–0.2)
PLATELET # BLD AUTO: 278 K/UL (ref 150–450)
PMV BLD AUTO: 11.2 FL (ref 9.4–12.3)
POTASSIUM SERPL-SCNC: 4.3 MMOL/L (ref 3.5–5.1)
RBC # BLD AUTO: 5.2 M/UL (ref 4.23–5.6)
SODIUM SERPL-SCNC: 137 MMOL/L (ref 136–145)
TRIGL SERPL-MCNC: 172 MG/DL (ref 35–150)
VLDLC SERPL CALC-MCNC: 34.4 MG/DL (ref 6–23)
WBC # BLD AUTO: 9.2 K/UL (ref 4.3–11.1)

## 2019-08-28 PROCEDURE — 80061 LIPID PANEL: CPT

## 2019-08-28 PROCEDURE — 74011000250 HC RX REV CODE- 250: Performed by: INTERNAL MEDICINE

## 2019-08-28 PROCEDURE — 74011250636 HC RX REV CODE- 250/636: Performed by: INTERNAL MEDICINE

## 2019-08-28 PROCEDURE — 97165 OT EVAL LOW COMPLEX 30 MIN: CPT

## 2019-08-28 PROCEDURE — 74011250637 HC RX REV CODE- 250/637: Performed by: FAMILY MEDICINE

## 2019-08-28 PROCEDURE — 97530 THERAPEUTIC ACTIVITIES: CPT

## 2019-08-28 PROCEDURE — 80048 BASIC METABOLIC PNL TOTAL CA: CPT

## 2019-08-28 PROCEDURE — 93880 EXTRACRANIAL BILAT STUDY: CPT

## 2019-08-28 PROCEDURE — 85027 COMPLETE CBC AUTOMATED: CPT

## 2019-08-28 PROCEDURE — C8929 TTE W OR WO FOL WCON,DOPPLER: HCPCS

## 2019-08-28 PROCEDURE — 74011250636 HC RX REV CODE- 250/636: Performed by: FAMILY MEDICINE

## 2019-08-28 PROCEDURE — 83036 HEMOGLOBIN GLYCOSYLATED A1C: CPT

## 2019-08-28 PROCEDURE — 70551 MRI BRAIN STEM W/O DYE: CPT

## 2019-08-28 PROCEDURE — 36415 COLL VENOUS BLD VENIPUNCTURE: CPT

## 2019-08-28 PROCEDURE — 96372 THER/PROPH/DIAG INJ SC/IM: CPT

## 2019-08-28 PROCEDURE — 99218 HC RM OBSERVATION: CPT

## 2019-08-28 RX ORDER — PREGABALIN 150 MG/1
300 CAPSULE ORAL 2 TIMES DAILY
Status: DISCONTINUED | OUTPATIENT
Start: 2019-08-28 | End: 2019-08-28 | Stop reason: HOSPADM

## 2019-08-28 RX ORDER — GABAPENTIN 300 MG/1
300 CAPSULE ORAL DAILY
Status: DISCONTINUED | OUTPATIENT
Start: 2019-08-28 | End: 2019-08-28 | Stop reason: HOSPADM

## 2019-08-28 RX ORDER — GABAPENTIN 100 MG/1
100 CAPSULE ORAL DAILY
Status: DISCONTINUED | OUTPATIENT
Start: 2019-08-28 | End: 2019-08-28

## 2019-08-28 RX ORDER — PREGABALIN 150 MG/1
300 CAPSULE ORAL DAILY
Status: DISCONTINUED | OUTPATIENT
Start: 2019-08-28 | End: 2019-08-28

## 2019-08-28 RX ADMIN — GABAPENTIN 300 MG: 300 CAPSULE ORAL at 11:00

## 2019-08-28 RX ADMIN — ENOXAPARIN SODIUM 40 MG: 40 INJECTION SUBCUTANEOUS at 09:30

## 2019-08-28 RX ADMIN — LEVOTHYROXINE SODIUM 75 MCG: 75 TABLET ORAL at 09:31

## 2019-08-28 RX ADMIN — Medication 5 ML: at 05:22

## 2019-08-28 RX ADMIN — HYDROCHLOROTHIAZIDE 25 MG: 25 TABLET ORAL at 09:31

## 2019-08-28 RX ADMIN — PREGABALIN 300 MG: 150 CAPSULE ORAL at 10:42

## 2019-08-28 RX ADMIN — CLOPIDOGREL BISULFATE 75 MG: 75 TABLET ORAL at 09:31

## 2019-08-28 RX ADMIN — PERFLUTREN 1 ML: 6.52 INJECTION, SUSPENSION INTRAVENOUS at 10:00

## 2019-08-28 RX ADMIN — VALSARTAN 320 MG: 320 TABLET, FILM COATED ORAL at 09:30

## 2019-08-28 NOTE — DISCHARGE INSTRUCTIONS
DISCHARGE SUMMARY from Nurse    PATIENT INSTRUCTIONS:    After general anesthesia or intravenous sedation, for 24 hours or while taking prescription Narcotics:  · Limit your activities  · Do not drive and operate hazardous machinery  · Do not make important personal or business decisions  · Do  not drink alcoholic beverages  · If you have not urinated within 8 hours after discharge, please contact your surgeon on call. Report the following to your surgeon:  · Excessive pain, swelling, redness or odor of or around the surgical area  · Temperature over 100.5  · Nausea and vomiting lasting longer than 4 hours or if unable to take medications  · Any signs of decreased circulation or nerve impairment to extremity: change in color, persistent  numbness, tingling, coldness or increase pain  · Any questions    What to do at Home:  Recommended activity: Activity as tolerated, f/u with PCP in 1 week, continue home medications,     If you experience any of the following symptoms: symptoms of stroke or TIA call 911, chest pain or shortness of breath call 911, any other concerns, please follow up with PCP. *  Please give a list of your current medications to your Primary Care Provider. *  Please update this list whenever your medications are discontinued, doses are      changed, or new medications (including over-the-counter products) are added. *  Please carry medication information at all times in case of emergency situations. These are general instructions for a healthy lifestyle:    No smoking/ No tobacco products/ Avoid exposure to second hand smoke  Surgeon General's Warning:  Quitting smoking now greatly reduces serious risk to your health.     Obesity, smoking, and sedentary lifestyle greatly increases your risk for illness    A healthy diet, regular physical exercise & weight monitoring are important for maintaining a healthy lifestyle    You may be retaining fluid if you have a history of heart failure or if you experience any of the following symptoms:  Weight gain of 3 pounds or more overnight or 5 pounds in a week, increased swelling in our hands or feet or shortness of breath while lying flat in bed. Please call your doctor as soon as you notice any of these symptoms; do not wait until your next office visit. The discharge information has been reviewed with the patient. The patient verbalized understanding. Discharge medications reviewed with the patient and appropriate educational materials and side effects teaching were provided. ___________________________________________________________________________________________________________________________________  Patient Education        Transient Ischemic Attack: Care Instructions  Your Care Instructions    A transient ischemic attack (TIA) is when blood flow to a part of your brain is blocked for a short time. A TIA is like a stroke but usually lasts only a few minutes. A TIA does not cause lasting brain damage. Any vision problems, slurred speech, or other symptoms usually go away in 10 to 20 minutes. But they may last for up to 24 hours. TIAs are often warning signs of a stroke. Some people who have a TIA may have a stroke in the future. A stroke can cause symptoms like those of a TIA. But a stroke causes lasting damage to your brain. You can take steps to help prevent a stroke. One thing you can do is get early treatment. If you have other new symptoms, or if your symptoms do not get better, go back to the emergency room or call your doctor right away. Getting treatment right away may prevent long-term brain damage caused by a stroke. The doctor has checked you carefully, but problems can develop later. If you notice any problems or new symptoms, get medical treatment right away. Follow-up care is a key part of your treatment and safety. Be sure to make and go to all appointments, and call your doctor if you are having problems.  It's also a good idea to know your test results and keep a list of the medicines you take. How can you care for yourself at home? Medicines    · Be safe with medicines. Take your medicines exactly as prescribed. Call your doctor if you think you are having a problem with your medicine.     · If you take a blood thinner, such as aspirin, be sure you get instructions about how to take your medicine safely. Blood thinners can cause serious bleeding problems.     · Call your doctor if you are not able to take your medicines for any reason.     · Do not take any over-the-counter medicines or herbal products without talking to your doctor first.     · If you take birth control pills or hormone therapy, talk to your doctor. Ask if these treatments are right for you.    Lifestyle changes    · Do not smoke. If you need help quitting, talk to your doctor about stop-smoking programs and medicines.     · Be active. If your doctor recommends it, get more exercise. Walking is a good choice. Bit by bit, increase the amount you walk every day. Try for at least 30 minutes on most days of the week. You also may want to swim, bike, or do other activities.     · Eat heart-healthy foods. These include fruits, vegetables, high-fiber foods, fish, and foods that are low in sodium, saturated fat, and trans fat.     · Stay at a healthy weight. Lose weight if you need to.     · Limit alcohol to 2 drinks a day for men and 1 drink a day for women.    Staying healthy    · Manage other health problems such as diabetes, high blood pressure, and high cholesterol.     · Get the flu vaccine every year. When should you call for help? Call 911 anytime you think you may need emergency care. For example, call if:    · You have new or worse symptoms of a stroke. These may include:  ? Sudden numbness, tingling, weakness, or loss of movement in your face, arm, or leg, especially on only one side of your body. ? Sudden vision changes.   ? Sudden trouble speaking. ? Sudden confusion or trouble understanding simple statements. ? Sudden problems with walking or balance. ? A sudden, severe headache that is different from past headaches. Call 911 even if these symptoms go away in a few minutes.     · You feel like you are having another TIA.    Watch closely for changes in your health, and be sure to contact your doctor if you have any problems. Where can you learn more? Go to http://alivia-karen.info/. Enter [phone] in the search box to learn more about \"Transient Ischemic Attack: Care Instructions. \"  Current as of: September 26, 2018  Content Version: 12.1  © 4749-1419 [employer], [employer]. Care instructions adapted under license by [employer] (which disclaims liability or warranty for this information). If you have questions about a medical condition or this instruction, always ask your healthcare professional. [address] any warranty or liability for your use of this information.

## 2019-08-28 NOTE — PROGRESS NOTES
Patient received as an admission from ER to room 367. Patient A&O x4, orientation to room/unit given. Admission assessment  completed. Skin assessment completed with Jo Cifuentes RN. Skin warm/dry, no areas of skin breakdown noted. Bed in low position, call bell in place, will monitor.

## 2019-08-28 NOTE — PROGRESS NOTES
Care Management Interventions  PCP Verified by CM: Yes  Mode of Transport at Discharge: Other (see comment)  Transition of Care Consult (CM Consult): Other  Current Support Network: Lives with Spouse  Confirm Follow Up Transport: Family  Plan discussed with Pt/Family/Caregiver: Yes  Freedom of Choice Offered: Yes  Discharge Location  Discharge Placement: Home  Visited with pt regarding plans for discharge, pt lives at home with spouse, inde with ADL's, current with PCP, no needs at this time.

## 2019-08-28 NOTE — PROGRESS NOTES
Patient asleep in bed this AM, slept well overnight. No s/s distress/discomfort noted. Will monitor.

## 2019-08-28 NOTE — CONSULTS
Request for neuro consult received earlier today but patient discharged prior to being seen    Signed By: Keyana Whitney MD     August 28, 2019

## 2019-08-28 NOTE — DISCHARGE SUMMARY
Hospitalist Discharge Summary     Patient ID:  Marly Gale  [id number]  89 y.o.  1955  Admit date: 8/27/2019  5:21 PM  Discharge date and time: 8/28/2019  Attending: Dilcia Mix MD  PCP:  Domenic Lacey MD  Treatment Team: Attending Provider: Dilcia Mix MD; Consulting Provider: Coty Peters MD; Consulting Provider: Niharika Ojeda MD; Primary Nurse: Braeden Guajardo; Utilization Review: Lisandro Corner    Principal Diagnosis Stroke-like symptoms   Principal Problem:    Stroke-like symptoms (8/27/2019)    Active Problems:    Hypertension (11/18/2017)      Acquired hypothyroidism (11/18/2017)      Hyperlipidemia (11/19/2017)      Thalamic infarct, acute ([facility] [address] (11/21/2017)      Major depression (9/13/2018)      DM type 2 (diabetes mellitus, type 2) ([facility] [address] (8/27/2019)      Left-sided weakness (8/27/2019)             Hospital Course:  Please refer to the admission H&P for details of presentation. In summary, the patient is a 71yo with hx prior CVA and TIA, DM, HTN who presented to ER with acute worsening of chronic L sided numbness/weakness. Evaluated by teleneuro, no TPA recommended. Admitted for stroke workup with no acute findings MRI or vascular imaging. Lipids and A1c with appropriate control. All symptoms improved back to prior baseline. TIA is likely cause. May be near optimized on secondary prevention. Only on baby asa and may benefit from full dose or dapt, but patient would like to discuss further with PCP prior to any change in antiplatelet. Significant Diagnostic Studies:   DUPLEX CAROTID BILATERAL   Final Result   Impression:   1. Less than 50% stenosis of the right internal carotid artery. 2. Less than 50% stenosis of the left internal carotid artery. MRI BRAIN WO CONT   Final Result   IMPRESSION: No acute infarction. Old lacunar infarct right thalamus. Volume   loss. Minimal chronic small vessel disease changes.          XR CHEST PORT Final Result   Impression: Unremarkable portable chest radiograph. CT HEAD WO CONT   Final Result   Impression:  No acute intracranial abnormality. Labs: Results:       Chemistry Recent Labs     08/28/19  0736 08/27/19  1735   * 171*    136   K 4.3 4.3    101   CO2 25 30   BUN 20 15   CREA 1.34 1.14   CA 9.4 9.5   AGAP 9 5*   AP  --  111   TP  --  7.9   ALB  --  3.8   GLOB  --  4.1*   AGRAT  --  0.9*      CBC w/Diff Recent Labs     08/28/19  0736 08/27/19  1735   WBC 9.2 9.3   RBC 5.20 5.17   HGB 16.0 16.3   HCT 47.2 46.9    289   GRANS  --  59   LYMPH  --  30   EOS  --  3      Cardiac Enzymes No results for input(s): CPK, CKND1, KORY in the last 72 hours. No lab exists for component: CKRMB, TROIP   Coagulation Recent Labs     08/27/19  1736 08/27/19  1735   INR 0.9  --    APTT  --  26.7       Lipid Panel Lab Results   Component Value Date/Time    Cholesterol, total 147 08/28/2019 07:36 AM    HDL Cholesterol 40 08/28/2019 07:36 AM    LDL, calculated 72.6 08/28/2019 07:36 AM    VLDL, calculated 34.4 (H) 08/28/2019 07:36 AM    Triglyceride 172 (H) 08/28/2019 07:36 AM    CHOL/HDL Ratio 3.7 08/28/2019 07:36 AM      BNP No results for input(s): BNPP in the last 72 hours. Liver Enzymes Recent Labs     08/27/19  1735   TP 7.9   ALB 3.8      SGOT 27      Thyroid Studies No results found for: T4, T3U, TSH, TSHEXT         Discharge Exam:  Visit Vitals  /70 (BP 1 Location: Right arm, BP Patient Position: At rest)   Pulse 88   Temp 98 °F (36.7 °C)   Resp 16   Ht 5' 9\" (1.753 m)   Wt 128.8 kg (284 lb)   SpO2 92%   BMI 41.94 kg/m²     General appearance: alert, cooperative, no distress, appears stated age   Lungs: clear to auscultation bilaterally  Heart: regular rate and rhythm, S1, S2 normal, no murmur, click, rub or gallop  Abdomen: soft, non-tender.  Bowel sounds normal. No masses,  no organomegaly  Extremities: no cyanosis or edema  Neurologic: baseline deficits    Disposition: home  Discharge Condition: stable  Patient Instructions:   Current Discharge Medication List      CONTINUE these medications which have NOT CHANGED    Details   vortioxetine (TRINTELLIX) 10 mg tablet Take 10 mg by mouth daily. pregabalin (LYRICA) 300 mg capsule Take 300 mg by mouth two (2) times a day. cyclobenzaprine (FLEXERIL) 10 mg tablet Take  by mouth three (3) times daily (with meals). gabapentin (NEURONTIN) 300 mg capsule Take 1 Cap by mouth daily. 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 Qty: 1 Vial, Refills: 1      insulin lispro (HUMALOG) 100 unit/mL injection Continue sliding scale  Qty: 1 Vial, Refills: 0      atorvastatin (LIPITOR) 40 mg tablet Take 40 mg by mouth nightly. aspirin delayed-release 81 mg tablet Take 81 mg by mouth daily. multivitamin, tx-iron-ca-min (THERA-M W/ IRON) 9 mg iron-400 mcg tab tablet Take 1 Tab by mouth daily. valsartan 320 mg tab 320 mg, hydroCHLOROthiazide 25 mg tab 25 mg Take 1 Dose by mouth daily. levothyroxine (SYNTHROID) 75 mcg tablet Take 75 mcg by mouth Daily (before breakfast). diazePAM (VALIUM) 5 mg tablet Take 5 mg by mouth two (2) times daily as needed for Anxiety. baclofen (LIORESAL) 10 mg tablet Take 1 Tab by mouth three (3) times daily. Qty: 15 Tab, Refills: 0      escitalopram oxalate (LEXAPRO) 10 mg tablet Take 20 mg by mouth daily. Activity: Activity as tolerated  Diet: Resume previous diet    Follow-up:     Follow-up Appointments   Procedures    FOLLOW UP VISIT Appointment in: One Week PCP     PCP     Standing Status:   Standing     Number of Occurrences:   1     Order Specific Question:   Appointment in     Answer:    One Week           Time spent to discharge patient 28 minutes  Signed:  Krishna Bruce MD  8/28/2019  11:36 AM

## 2019-08-28 NOTE — PROGRESS NOTES
Problem: Self Care Deficits Care Plan (Adult)  Goal: *Acute Goals and Plan of Care (Insert Text)  Outcome: Progressing Towards Goal     OCCUPATIONAL THERAPY: Initial Assessment, Daily Note and AM 8/28/2019  OBSERVATION:    [place] / [place] / Product Type: Medicare /      NAME/AGE/GENDER: Nathalia Rangel is a 75 y.o. male   PRIMARY DIAGNOSIS:  Stroke-like symptoms [R29.90] Stroke-like symptoms   Stroke-like symptoms          ICD-10: Treatment Diagnosis:    · Other lack of cordination (R27.8)   Precautions/Allergies:    Patient has no known allergies. ASSESSMENT:     Mr. Ramy Eddy admitted with above diagnosis. Pt independent with self care and functional mobility. No skilled OT indicated at this time. Will discharge OT    This section established at most recent assessment   PROBLEM LIST (Impairments causing functional limitations):   INTERVENTIONS PLANNED: (Benefits and precautions of occupational therapy have been discussed with the patient.)     TREATMENT PLAN: Frequency/Duration: discharge OT  Rehabilitation Potential For Stated Goals: discharge OT     REHAB RECOMMENDATIONS (at time of discharge pending progress):    Placement: It is my opinion, based on this patient's performance to date, that Mr. Ramy Eddy may benefit from being discharged with NO further skilled therapy due to a proven ability to function at baseline. Equipment:    None at this time              OCCUPATIONAL PROFILE AND HISTORY:   History of Present Injury/Illness (Reason for Referral):  Stroke like symptoms  Past Medical History/Comorbidities:   Mr. Ramy Eddy  has a past medical history of Chronic kidney disease, Hypertension, Major depression, Stroke ([facility] [address] (11/2017), and Thyroid disease. Mr. Ramy Eddy  has no past surgical history on file.   Social History/Living Environment:   Home Environment: Private residence  One/Two Story Residence: One story  Prior Level of Function/Work/Activity:  independent Number of Personal Factors/Comorbidities that affect the Plan of Care: Brief history (0):  LOW COMPLEXITY   ASSESSMENT OF OCCUPATIONAL PERFORMANCE[de-identified]   Activities of Daily Living:   Basic ADLs (From Assessment) Complex ADLs (From Assessment)   Feeding: Independent  Oral Facial Hygiene/Grooming: Independent  Bathing: Independent  Upper Body Dressing: Independent  Lower Body Dressing: Independent  Toileting: Independent     Grooming/Bathing/Dressing Activities of Daily Living     Cognitive Retraining  Safety/Judgement: Fall prevention                 Functional Transfers  Bathroom Mobility: Independent  Toilet Transfer : Independent  Shower Transfer: Independent     Bed/Mat Mobility  Rolling: Independent  Supine to Sit: Independent  Sit to Supine: Independent  Sit to Stand: Independent  Stand to Sit: Independent  Bed to Chair: Independent  Scooting: Independent     Most Recent Physical Functioning:   Gross Assessment:  AROM: Generally decreased, functional(LUE)  Strength: Generally decreased, functional(LUE)  Coordination: Generally decreased, functional(LUE)  Tone: Normal  Sensation: Impaired(LUE)               Posture:     Balance:  Sitting: Intact  Standing: Intact Bed Mobility:  Rolling: Independent  Supine to Sit: Independent  Sit to Supine: Independent  Scooting: Independent  Wheelchair Mobility:     Transfers:  Sit to Stand: Independent  Stand to Sit: Independent  Bed to Chair: Independent            No data found. Mental Status  Neurologic State: Alert  Orientation Level: Oriented X4  Cognition: Follows commands  Perception: Appears intact  Perseveration: No perseveration noted  Safety/Judgement: Fall prevention                          Physical Skills Involved:  1. Balance  2. Strength  3. Activity Tolerance Cognitive Skills Affected (resulting in the inability to perform in a timely and safe manner):   1. none  Psychosocial Skills Affected:  1. none    Number of elements that affect the Plan of Care: 1-3:  LOW COMPLEXITY   CLINICAL DECISION MAKIN[address] [place] [address] AM-PAC 6 Clicks   Daily Activity Inpatient Short Form  How much help from another person does the patient currently need. .. Total A Lot A Little None   1. Putting on and taking off regular lower body clothing? ? 1   ? 2   ? 3   ? 4   2. Bathing (including washing, rinsing, drying)? ? 1   ? 2   ? 3   ? 4   3. Toileting, which includes using toilet, bedpan or urinal?   ? 1   ? 2   ? 3   ? 4   4. Putting on and taking off regular upper body clothing? ? 1   ? 2   ? 3   ? 4   5. Taking care of personal grooming such as brushing teeth? ? 1   ? 2   ? 3   ? 4   6. Eating meals? ? 1   ? 2   ? 3   ? 4   © , Trustees of [address] [place] [address], under license to [employer]. All rights reserved      Score:  Initial: 24 Most Recent: X (Date: -- )    Interpretation of Tool:  Represents activities that are increasingly more difficult (i.e. Bed mobility, Transfers, Gait). Medical Necessity:    · .discharge OT  Reason for Services/Other Comments:  · Discharge OT   Use of outcome tool(s) and clinical judgement create a POC that gives a: LOW COMPLEXITY         TREATMENT:   (In addition to Assessment/Re-Assessment sessions the following treatments were rendered)     Pre-treatment Symptoms/Complaints:  tolerated ambulating in room   Pain: Initial:   Pain Intensity 1: 0  Post Session:  0     Therapeutic Activity: ( 10): Therapeutic activities including Bed transfers, Chair transfers and functional mobility  to improve strength, balance and coordination.      Assessment/Reassessment (5)    Braces/Orthotics/Lines/Etc:   ·   Treatment/Session Assessment:    · Response to Treatment:  tolerated well  · Interdisciplinary Collaboration:   o Physical Therapist  o Registered Nurse  · After treatment position/precautions:   o Supine in bed  o Bed/Chair-wheels locked  o Bed in low position  o Caregiver at bedside  o Call light within reach  o RN notified  o Side rails x 3   · Compliance with Program/Exercises: Compliant all of the time. · Recommendations/Intent for next treatment session: \"Next visit will focus on advancements to more challenging activities and reduction in assistance provided\".   Total Treatment Duration:  OT Patient Time In/Time Out  Time In: 1100  Time Out: 3000 [facility]

## 2019-08-28 NOTE — PROGRESS NOTES
Physical therapy - attempted PT eval.  Pt. Up in room on contact. He reports feeling fine and back to normal.  He refused PT eval-did not want to be evaluated. He said he is going home soon. No questions or concerns from patient.

## 2019-08-28 NOTE — ROUTINE PROCESS
TRANSFER - OUT REPORT:    Verbal report given to [facility] on Ina Spence  being transferred to room 367for routine progression of care       Report consisted of patients Situation, Background, Assessment and   Recommendations(SBAR). Information from the following report(s) SBAR was reveiwed with the receiving nurse. Opportunity for questions and clarification was provided.       Ischemic Stroke without Activase/TIA    BP Parameters: Less Than 220/120 for 24 hours, then consult MD for parameters    Controlled With: None    Dysphagia Screen Completed: Yes: Pass  Dysphagia Screening  Vocal Quality/Secretions: Normal  History of Dysphagia: No  O2 Saturation: Normal  Alertness: Normal  Pre-Swallow Assessment Score: 0  Purees: No difficulty noted  Water by Cup: No difficulty noted  Water by Straw: No difficulty noted     NIH Stroke Scale Complete: Yes: 1    Frequency of Vital Signs: Every 2 hours    Frequency of Neuro Checks: Every 2 hours

## 2019-08-28 NOTE — PROGRESS NOTES
Discharge instructions were reviewed with the patient. Opportunity for questions given. Patient verbalized understanding of discharge and follow up instructions, as well as S/S to report to MD or return to ER for. PIV was removed. Patient will D/C to home.

## 2019-08-28 NOTE — PROGRESS NOTES
Shift Assessment - Patient is resting in a low, locked bed. Call light in reach. Resp even and unlabored. Lung sounds clear. Bowel sounds active x4. Skin intact, warm and dry. Patient experiencing left-sided numbness and weakness. Patient denies pain at this time.

## 2019-08-28 NOTE — PROGRESS NOTES
08/27/19 2230   Dual Skin Pressure Injury Assessment   Dual Skin Pressure Injury Assessment WDL   Second Care Provider (Based on [address] [place] [address]) Genet BARRON   Skin Integumentary   Skin Integumentary (WDL) WDL   Skin Color Appropriate for ethnicity   Skin Condition/Temp Dry; Warm   Skin Integrity Intact

## 2020-10-22 ENCOUNTER — APPOINTMENT (RX ONLY)
Dept: URBAN - METROPOLITAN AREA CLINIC 349 | Facility: CLINIC | Age: 65
Setting detail: DERMATOLOGY
End: 2020-10-22

## 2020-10-22 DIAGNOSIS — L28.1 PRURIGO NODULARIS: ICD-10-CM

## 2020-10-22 DIAGNOSIS — Z71.89 OTHER SPECIFIED COUNSELING: ICD-10-CM

## 2020-10-22 DIAGNOSIS — L08.0 PYODERMA: ICD-10-CM

## 2020-10-22 PROCEDURE — ? COUNSELING

## 2020-10-22 PROCEDURE — ? PRESCRIPTION

## 2020-10-22 PROCEDURE — ? EDUCATIONAL RESOURCES PROVIDED

## 2020-10-22 PROCEDURE — 99203 OFFICE O/P NEW LOW 30 MIN: CPT

## 2020-10-22 RX ORDER — FLUTICASONE PROPIONATE 0.05 MG/G
OINTMENT TOPICAL
Qty: 1 | Refills: 3 | Status: ERX | COMMUNITY
Start: 2020-10-22

## 2020-10-22 RX ORDER — CEPHALEXIN 500 MG/1
CAPSULE ORAL
Qty: 60 | Refills: 0 | Status: ERX | COMMUNITY
Start: 2020-10-22

## 2020-10-22 RX ORDER — MUPIROCIN 20 MG/G
OINTMENT TOPICAL
Qty: 1 | Refills: 3 | Status: ERX | COMMUNITY
Start: 2020-10-22

## 2020-10-22 RX ADMIN — MUPIROCIN: 20 OINTMENT TOPICAL at 00:00

## 2020-10-22 RX ADMIN — CEPHALEXIN: 500 CAPSULE ORAL at 00:00

## 2020-10-22 RX ADMIN — FLUTICASONE PROPIONATE: 0.05 OINTMENT TOPICAL at 00:00

## 2020-10-22 ASSESSMENT — LOCATION DETAILED DESCRIPTION DERM
LOCATION DETAILED: LEFT INFERIOR MEDIAL UPPER BACK
LOCATION DETAILED: SUPERIOR THORACIC SPINE
LOCATION DETAILED: RIGHT PROXIMAL DORSAL FOREARM
LOCATION DETAILED: RIGHT DISTAL PRETIBIAL REGION
LOCATION DETAILED: LEFT PROXIMAL PRETIBIAL REGION
LOCATION DETAILED: LEFT PROXIMAL DORSAL FOREARM
LOCATION DETAILED: INFERIOR THORACIC SPINE
LOCATION DETAILED: RIGHT POSTERIOR SHOULDER
LOCATION DETAILED: LEFT POSTERIOR SHOULDER
LOCATION DETAILED: LEFT DISTAL PRETIBIAL REGION

## 2020-10-22 ASSESSMENT — LOCATION SIMPLE DESCRIPTION DERM
LOCATION SIMPLE: LEFT FOREARM
LOCATION SIMPLE: RIGHT FOREARM
LOCATION SIMPLE: RIGHT SHOULDER
LOCATION SIMPLE: RIGHT PRETIBIAL REGION
LOCATION SIMPLE: LEFT PRETIBIAL REGION
LOCATION SIMPLE: LEFT UPPER BACK
LOCATION SIMPLE: LEFT SHOULDER
LOCATION SIMPLE: UPPER BACK

## 2020-10-22 ASSESSMENT — LOCATION ZONE DERM
LOCATION ZONE: ARM
LOCATION ZONE: TRUNK
LOCATION ZONE: LEG

## 2022-03-18 PROBLEM — R53.1 LEFT-SIDED WEAKNESS: Status: ACTIVE | Noted: 2019-08-27

## 2022-03-18 PROBLEM — R29.90 STROKE-LIKE SYMPTOMS: Status: ACTIVE | Noted: 2019-08-27

## 2022-03-18 PROBLEM — F32.9 MAJOR DEPRESSION: Status: ACTIVE | Noted: 2018-09-13

## 2022-03-18 PROBLEM — F98.8 ADD (ATTENTION DEFICIT DISORDER): Status: ACTIVE | Noted: 2017-11-18

## 2022-03-19 PROBLEM — E03.9 ACQUIRED HYPOTHYROIDISM: Status: ACTIVE | Noted: 2017-11-18

## 2022-03-19 PROBLEM — I10 HYPERTENSION: Status: ACTIVE | Noted: 2017-11-18

## 2022-03-19 PROBLEM — I63.81 THALAMIC INFARCT, ACUTE (HCC): Status: ACTIVE | Noted: 2017-11-21

## 2022-03-19 PROBLEM — G45.9 TIA (TRANSIENT ISCHEMIC ATTACK): Status: ACTIVE | Noted: 2017-11-18

## 2022-03-19 PROBLEM — E78.5 HYPERLIPIDEMIA: Status: ACTIVE | Noted: 2017-11-19

## 2022-03-19 PROBLEM — E11.44 DIABETIC AMYOTROPHY ASSOCIATED WITH TYPE 2 DIABETES MELLITUS (HCC): Status: ACTIVE | Noted: 2018-09-18

## 2022-03-20 PROBLEM — E11.9 DM TYPE 2 (DIABETES MELLITUS, TYPE 2) (HCC): Status: ACTIVE | Noted: 2019-08-27

## 2023-07-02 ENCOUNTER — HOSPITAL ENCOUNTER (EMERGENCY)
Age: 68
Discharge: HOME | End: 2023-07-02
Attending: EMERGENCY MEDICINE
Payer: MEDICARE

## 2023-07-02 ENCOUNTER — APPOINTMENT (OUTPATIENT)
Dept: GENERAL RADIOLOGY | Age: 68
End: 2023-07-02
Payer: MEDICARE

## 2023-07-02 VITALS
WEIGHT: 233 LBS | HEART RATE: 84 BPM | TEMPERATURE: 97.7 F | SYSTOLIC BLOOD PRESSURE: 107 MMHG | RESPIRATION RATE: 19 BRPM | DIASTOLIC BLOOD PRESSURE: 65 MMHG | OXYGEN SATURATION: 99 % | BODY MASS INDEX: 34.51 KG/M2 | HEIGHT: 69 IN

## 2023-07-02 DIAGNOSIS — I95.9 HYPOTENSION, UNSPECIFIED HYPOTENSION TYPE: ICD-10-CM

## 2023-07-02 DIAGNOSIS — N28.9 RENAL INSUFFICIENCY: ICD-10-CM

## 2023-07-02 DIAGNOSIS — E86.0 DEHYDRATION: Primary | ICD-10-CM

## 2023-07-02 DIAGNOSIS — N39.0 URINARY TRACT INFECTION WITHOUT HEMATURIA, SITE UNSPECIFIED: ICD-10-CM

## 2023-07-02 LAB
ALBUMIN SERPL-MCNC: 3.2 G/DL (ref 3.2–4.6)
ALBUMIN/GLOB SERPL: 0.7 (ref 0.4–1.6)
ALP SERPL-CCNC: 120 U/L (ref 50–136)
ALT SERPL-CCNC: 41 U/L (ref 12–65)
AMORPH CRY URNS QL MICRO: ABNORMAL
ANION GAP SERPL CALC-SCNC: 8 MMOL/L (ref 2–11)
APPEARANCE UR: ABNORMAL
AST SERPL-CCNC: 26 U/L (ref 15–37)
BACTERIA URNS QL MICRO: ABNORMAL /HPF
BASOPHILS # BLD: 0.1 K/UL (ref 0–0.2)
BASOPHILS NFR BLD: 1 % (ref 0–2)
BILIRUB SERPL-MCNC: 0.5 MG/DL (ref 0.2–1.1)
BILIRUB UR QL: NEGATIVE
BUN SERPL-MCNC: 30 MG/DL (ref 8–23)
CALCIUM SERPL-MCNC: 10.1 MG/DL (ref 8.3–10.4)
CASTS URNS QL MICRO: ABNORMAL /LPF
CHLORIDE SERPL-SCNC: 102 MMOL/L (ref 101–110)
CO2 SERPL-SCNC: 25 MMOL/L (ref 21–32)
COLOR UR: ABNORMAL
CREAT SERPL-MCNC: 1.86 MG/DL (ref 0.8–1.5)
CRYSTALS URNS QL MICRO: 0 /LPF
DIFFERENTIAL METHOD BLD: ABNORMAL
EKG ATRIAL RATE: 85 BPM
EKG DIAGNOSIS: NORMAL
EKG P AXIS: 39 DEGREES
EKG P-R INTERVAL: 190 MS
EKG Q-T INTERVAL: 356 MS
EKG QRS DURATION: 97 MS
EKG QTC CALCULATION (BAZETT): 424 MS
EKG R AXIS: 23 DEGREES
EKG T AXIS: 30 DEGREES
EKG VENTRICULAR RATE: 85 BPM
EOSINOPHIL # BLD: 0.4 K/UL (ref 0–0.8)
EOSINOPHIL NFR BLD: 3 % (ref 0.5–7.8)
EPI CELLS #/AREA URNS HPF: ABNORMAL /HPF
ERYTHROCYTE [DISTWIDTH] IN BLOOD BY AUTOMATED COUNT: 13.4 % (ref 11.9–14.6)
GLOBULIN SER CALC-MCNC: 4.3 G/DL (ref 2.8–4.5)
GLUCOSE SERPL-MCNC: 128 MG/DL (ref 65–100)
GLUCOSE UR STRIP.AUTO-MCNC: NEGATIVE MG/DL
HCT VFR BLD AUTO: 40.5 % (ref 41.1–50.3)
HGB BLD-MCNC: 13.9 G/DL (ref 13.6–17.2)
HGB UR QL STRIP: NEGATIVE
IMM GRANULOCYTES # BLD AUTO: 0.1 K/UL (ref 0–0.5)
IMM GRANULOCYTES NFR BLD AUTO: 1 % (ref 0–5)
KETONES UR QL STRIP.AUTO: ABNORMAL MG/DL
LACTATE SERPL-SCNC: 1.4 MMOL/L (ref 0.4–2)
LEUKOCYTE ESTERASE UR QL STRIP.AUTO: ABNORMAL
LYMPHOCYTES # BLD: 2.4 K/UL (ref 0.5–4.6)
LYMPHOCYTES NFR BLD: 19 % (ref 13–44)
MCH RBC QN AUTO: 32.1 PG (ref 26.1–32.9)
MCHC RBC AUTO-ENTMCNC: 34.3 G/DL (ref 31.4–35)
MCV RBC AUTO: 93.5 FL (ref 82–102)
MONOCYTES # BLD: 1.5 K/UL (ref 0.1–1.3)
MONOCYTES NFR BLD: 12 % (ref 4–12)
MUCOUS THREADS URNS QL MICRO: 0 /LPF
NEUTS SEG # BLD: 8.2 K/UL (ref 1.7–8.2)
NEUTS SEG NFR BLD: 64 % (ref 43–78)
NITRITE UR QL STRIP.AUTO: NEGATIVE
NRBC # BLD: 0 K/UL (ref 0–0.2)
PH UR STRIP: 5 (ref 5–9)
PLATELET # BLD AUTO: 348 K/UL (ref 150–450)
PMV BLD AUTO: 10.8 FL (ref 9.4–12.3)
POTASSIUM SERPL-SCNC: 4.3 MMOL/L (ref 3.5–5.1)
PROT SERPL-MCNC: 7.5 G/DL (ref 6.3–8.2)
PROT UR STRIP-MCNC: ABNORMAL MG/DL
RBC # BLD AUTO: 4.33 M/UL (ref 4.23–5.6)
RBC #/AREA URNS HPF: ABNORMAL /HPF
SODIUM SERPL-SCNC: 135 MMOL/L (ref 133–143)
SP GR UR REFRACTOMETRY: 1.02 (ref 1–1.02)
TROPONIN I SERPL HS-MCNC: <3 PG/ML (ref 0–14)
URINE CULTURE IF INDICATED: ABNORMAL
UROBILINOGEN UR QL STRIP.AUTO: 1 EU/DL (ref 0.2–1)
WBC # BLD AUTO: 12.7 K/UL (ref 4.3–11.1)
WBC URNS QL MICRO: ABNORMAL /HPF

## 2023-07-02 PROCEDURE — 93010 ELECTROCARDIOGRAM REPORT: CPT | Performed by: INTERNAL MEDICINE

## 2023-07-02 PROCEDURE — 87086 URINE CULTURE/COLONY COUNT: CPT

## 2023-07-02 PROCEDURE — 96360 HYDRATION IV INFUSION INIT: CPT

## 2023-07-02 PROCEDURE — 80053 COMPREHEN METABOLIC PANEL: CPT

## 2023-07-02 PROCEDURE — 71045 X-RAY EXAM CHEST 1 VIEW: CPT

## 2023-07-02 PROCEDURE — 81001 URINALYSIS AUTO W/SCOPE: CPT

## 2023-07-02 PROCEDURE — 96361 HYDRATE IV INFUSION ADD-ON: CPT

## 2023-07-02 PROCEDURE — 93005 ELECTROCARDIOGRAM TRACING: CPT | Performed by: EMERGENCY MEDICINE

## 2023-07-02 PROCEDURE — 2580000003 HC RX 258: Performed by: EMERGENCY MEDICINE

## 2023-07-02 PROCEDURE — 84484 ASSAY OF TROPONIN QUANT: CPT

## 2023-07-02 PROCEDURE — 85025 COMPLETE CBC W/AUTO DIFF WBC: CPT

## 2023-07-02 PROCEDURE — 99285 EMERGENCY DEPT VISIT HI MDM: CPT

## 2023-07-02 PROCEDURE — 83605 ASSAY OF LACTIC ACID: CPT

## 2023-07-02 RX ORDER — 0.9 % SODIUM CHLORIDE 0.9 %
1000 INTRAVENOUS SOLUTION INTRAVENOUS
Status: COMPLETED | OUTPATIENT
Start: 2023-07-02 | End: 2023-07-02

## 2023-07-02 RX ORDER — SULFAMETHOXAZOLE AND TRIMETHOPRIM 800; 160 MG/1; MG/1
1 TABLET ORAL 2 TIMES DAILY
Qty: 14 TABLET | Refills: 0 | Status: SHIPPED | OUTPATIENT
Start: 2023-07-02 | End: 2023-07-09

## 2023-07-02 RX ORDER — 0.9 % SODIUM CHLORIDE 0.9 %
1000 INTRAVENOUS SOLUTION INTRAVENOUS ONCE
Status: COMPLETED | OUTPATIENT
Start: 2023-07-02 | End: 2023-07-02

## 2023-07-02 RX ADMIN — SODIUM CHLORIDE 1000 ML: 9 INJECTION, SOLUTION INTRAVENOUS at 17:30

## 2023-07-02 RX ADMIN — SODIUM CHLORIDE 1000 ML: 9 INJECTION, SOLUTION INTRAVENOUS at 17:00

## 2023-07-02 ASSESSMENT — ENCOUNTER SYMPTOMS
SORE THROAT: 0
BACK PAIN: 0
DIARRHEA: 1
SHORTNESS OF BREATH: 0
NAUSEA: 1
VOMITING: 0
ABDOMINAL PAIN: 0
COUGH: 0

## 2023-07-02 ASSESSMENT — LIFESTYLE VARIABLES
HOW OFTEN DO YOU HAVE A DRINK CONTAINING ALCOHOL: NEVER
HOW MANY STANDARD DRINKS CONTAINING ALCOHOL DO YOU HAVE ON A TYPICAL DAY: PATIENT DOES NOT DRINK

## 2023-07-02 ASSESSMENT — PAIN SCALES - GENERAL
PAINLEVEL_OUTOF10: 0
PAINLEVEL_OUTOF10: 0

## 2023-07-02 ASSESSMENT — PAIN - FUNCTIONAL ASSESSMENT
PAIN_FUNCTIONAL_ASSESSMENT: 0-10
PAIN_FUNCTIONAL_ASSESSMENT: 0-10

## 2023-07-05 LAB
BACTERIA SPEC CULT: NORMAL
BACTERIA SPEC CULT: NORMAL
SERVICE CMNT-IMP: NORMAL

## 2024-02-28 ENCOUNTER — APPOINTMENT (RX ONLY)
Dept: URBAN - METROPOLITAN AREA CLINIC 25 | Facility: CLINIC | Age: 69
Setting detail: DERMATOLOGY
End: 2024-02-28

## 2024-02-28 DIAGNOSIS — L30.9 DERMATITIS, UNSPECIFIED: ICD-10-CM

## 2024-02-28 DIAGNOSIS — L20.89 OTHER ATOPIC DERMATITIS: ICD-10-CM

## 2024-02-28 PROCEDURE — ? ADDITIONAL NOTES

## 2024-02-28 PROCEDURE — 11102 TANGNTL BX SKIN SINGLE LES: CPT

## 2024-02-28 PROCEDURE — ? PRESCRIPTION MEDICATION MANAGEMENT

## 2024-02-28 PROCEDURE — 99204 OFFICE O/P NEW MOD 45 MIN: CPT | Mod: 25

## 2024-02-28 PROCEDURE — 11103 TANGNTL BX SKIN EA SEP/ADDL: CPT

## 2024-02-28 PROCEDURE — ? DUPIXENT INITIATION

## 2024-02-28 PROCEDURE — ? PRESCRIPTION

## 2024-02-28 PROCEDURE — ? COUNSELING

## 2024-02-28 PROCEDURE — ? BIOPSY BY SHAVE METHOD

## 2024-02-28 RX ORDER — METHYLPREDNISOLONE 4 MG/1
TABLET ORAL
Qty: 1 | Refills: 0 | Status: ERX | COMMUNITY
Start: 2024-02-28

## 2024-02-28 RX ORDER — TRIAMCINOLONE ACETONIDE 1 MG/G
CREAM TOPICAL BID
Qty: 454 | Refills: 3 | Status: ERX | COMMUNITY
Start: 2024-02-28

## 2024-02-28 RX ADMIN — TRIAMCINOLONE ACETONIDE: 1 CREAM TOPICAL at 00:00

## 2024-02-28 RX ADMIN — METHYLPREDNISOLONE: 4 TABLET ORAL at 00:00

## 2024-02-28 ASSESSMENT — LOCATION ZONE DERM
LOCATION ZONE: ARM
LOCATION ZONE: TRUNK

## 2024-02-28 ASSESSMENT — LOCATION DETAILED DESCRIPTION DERM
LOCATION DETAILED: LEFT PROXIMAL DORSAL FOREARM
LOCATION DETAILED: RIGHT SUPERIOR MEDIAL UPPER BACK
LOCATION DETAILED: RIGHT INFERIOR LATERAL UPPER BACK
LOCATION DETAILED: EPIGASTRIC SKIN
LOCATION DETAILED: LEFT PROXIMAL POSTERIOR UPPER ARM

## 2024-02-28 ASSESSMENT — LOCATION SIMPLE DESCRIPTION DERM
LOCATION SIMPLE: ABDOMEN
LOCATION SIMPLE: LEFT UPPER ARM
LOCATION SIMPLE: LEFT FOREARM
LOCATION SIMPLE: RIGHT UPPER BACK

## 2024-02-28 ASSESSMENT — ITCH NUMERIC RATING SCALE: HOW SEVERE IS YOUR ITCHING?: 8

## 2024-02-28 ASSESSMENT — SEVERITY ASSESSMENT 2020: SEVERITY 2020: SEVERE

## 2024-02-28 ASSESSMENT — BSA ECZEMA: % BODY COVERED IN ECZEMA: 25

## 2024-02-28 NOTE — PROCEDURE: PRESCRIPTION MEDICATION MANAGEMENT
Detail Level: Zone
Initiate Treatment: triamcinolone acetonide 0.1 % topical cream BID\\nSig: Apply twice daily to eczema up to 2 weeks/month as needed. Not for groin, face, axilla
Render In Strict Bullet Format?: No

## 2024-02-28 NOTE — PROCEDURE: ADDITIONAL NOTES
Additional Notes: Filled out [employer] paperwork in office- will send to Sara and [employer] my way once pathology report comes back
Render Risk Assessment In Note?: no
Detail Level: Simple

## 2025-04-16 ENCOUNTER — APPOINTMENT (OUTPATIENT)
Dept: CT IMAGING | Age: 70
End: 2025-04-16
Payer: MEDICARE

## 2025-04-16 ENCOUNTER — APPOINTMENT (OUTPATIENT)
Dept: ULTRASOUND IMAGING | Age: 70
End: 2025-04-16
Payer: MEDICARE

## 2025-04-16 ENCOUNTER — HOSPITAL ENCOUNTER (OUTPATIENT)
Age: 70
Setting detail: OBSERVATION
Discharge: HOME | End: 2025-04-17
Attending: GENERAL PRACTICE | Admitting: FAMILY MEDICINE
Payer: MEDICARE

## 2025-04-16 DIAGNOSIS — R93.5 ABNORMAL ABDOMINAL CT SCAN: ICD-10-CM

## 2025-04-16 DIAGNOSIS — M54.9 INTRACTABLE BACK PAIN: Primary | ICD-10-CM

## 2025-04-16 PROBLEM — M54.59 INTRACTABLE LOW BACK PAIN: Status: ACTIVE | Noted: 2025-04-16

## 2025-04-16 LAB
ALBUMIN SERPL-MCNC: 3.9 G/DL (ref 3.2–4.6)
ALBUMIN/GLOB SERPL: 1.4 (ref 1–1.9)
ALP SERPL-CCNC: 137 U/L (ref 40–129)
ALT SERPL-CCNC: 68 U/L (ref 8–55)
ANION GAP SERPL CALC-SCNC: 11 MMOL/L (ref 7–16)
APPEARANCE UR: CLEAR
AST SERPL-CCNC: 70 U/L (ref 15–37)
BACTERIA URNS QL MICRO: 0 /HPF
BASOPHILS # BLD: 0.06 K/UL (ref 0–0.2)
BASOPHILS NFR BLD: 0.8 % (ref 0–2)
BILIRUB SERPL-MCNC: 0.4 MG/DL (ref 0–1.2)
BILIRUB UR QL: NEGATIVE
BUN SERPL-MCNC: 24 MG/DL (ref 8–23)
CALCIUM SERPL-MCNC: 9.6 MG/DL (ref 8.8–10.2)
CASTS URNS QL MICRO: ABNORMAL /LPF
CHLORIDE SERPL-SCNC: 103 MMOL/L (ref 98–107)
CO2 SERPL-SCNC: 24 MMOL/L (ref 20–29)
COLOR UR: ABNORMAL
CREAT SERPL-MCNC: 1.37 MG/DL (ref 0.8–1.3)
DIFFERENTIAL METHOD BLD: NORMAL
EOSINOPHIL # BLD: 0.51 K/UL (ref 0–0.8)
EOSINOPHIL NFR BLD: 6.7 % (ref 0.5–7.8)
EPI CELLS #/AREA URNS HPF: ABNORMAL /HPF
ERYTHROCYTE [DISTWIDTH] IN BLOOD BY AUTOMATED COUNT: 13.9 % (ref 11.9–14.6)
GLOBULIN SER CALC-MCNC: 2.7 G/DL (ref 2.3–3.5)
GLUCOSE SERPL-MCNC: 183 MG/DL (ref 70–99)
GLUCOSE UR STRIP.AUTO-MCNC: NEGATIVE MG/DL
HCT VFR BLD AUTO: 41.9 % (ref 41.1–50.3)
HGB BLD-MCNC: 14.2 G/DL (ref 13.6–17.2)
HGB UR QL STRIP: NEGATIVE
IMM GRANULOCYTES # BLD AUTO: 0.03 K/UL (ref 0–0.5)
IMM GRANULOCYTES NFR BLD AUTO: 0.4 % (ref 0–5)
KETONES UR QL STRIP.AUTO: ABNORMAL MG/DL
LEUKOCYTE ESTERASE UR QL STRIP.AUTO: NEGATIVE
LYMPHOCYTES # BLD: 1.78 K/UL (ref 0.5–4.6)
LYMPHOCYTES NFR BLD: 23.3 % (ref 13–44)
MCH RBC QN AUTO: 31.4 PG (ref 26.1–32.9)
MCHC RBC AUTO-ENTMCNC: 33.9 G/DL (ref 31.4–35)
MCV RBC AUTO: 92.7 FL (ref 82–102)
MONOCYTES # BLD: 0.58 K/UL (ref 0.1–1.3)
MONOCYTES NFR BLD: 7.6 % (ref 4–12)
NEUTS SEG # BLD: 4.68 K/UL (ref 1.7–8.2)
NEUTS SEG NFR BLD: 61.2 % (ref 43–78)
NITRITE UR QL STRIP.AUTO: NEGATIVE
NRBC # BLD: 0 K/UL (ref 0–0.2)
OTHER OBSERVATIONS: ABNORMAL
PH UR STRIP: 5 (ref 5–9)
PLATELET # BLD AUTO: 189 K/UL (ref 150–450)
PMV BLD AUTO: 11.3 FL (ref 9.4–12.3)
POTASSIUM SERPL-SCNC: 5.1 MMOL/L (ref 3.5–5.1)
PROT SERPL-MCNC: 6.5 G/DL (ref 6.3–8.2)
PROT UR STRIP-MCNC: 30 MG/DL
RBC # BLD AUTO: 4.52 M/UL (ref 4.23–5.6)
RBC #/AREA URNS HPF: ABNORMAL /HPF
SODIUM SERPL-SCNC: 138 MMOL/L (ref 136–145)
SP GR UR REFRACTOMETRY: 1.03 (ref 1–1.02)
UROBILINOGEN UR QL STRIP.AUTO: 0.2 EU/DL (ref 0.2–1)
WBC # BLD AUTO: 7.6 K/UL (ref 4.3–11.1)
WBC URNS QL MICRO: ABNORMAL /HPF

## 2025-04-16 PROCEDURE — 80053 COMPREHEN METABOLIC PANEL: CPT

## 2025-04-16 PROCEDURE — 96375 TX/PRO/DX INJ NEW DRUG ADDON: CPT

## 2025-04-16 PROCEDURE — 74177 CT ABD & PELVIS W/CONTRAST: CPT

## 2025-04-16 PROCEDURE — 96374 THER/PROPH/DIAG INJ IV PUSH: CPT

## 2025-04-16 PROCEDURE — 70450 CT HEAD/BRAIN W/O DYE: CPT

## 2025-04-16 PROCEDURE — 74176 CT ABD & PELVIS W/O CONTRAST: CPT

## 2025-04-16 PROCEDURE — 99285 EMERGENCY DEPT VISIT HI MDM: CPT

## 2025-04-16 PROCEDURE — 96376 TX/PRO/DX INJ SAME DRUG ADON: CPT

## 2025-04-16 PROCEDURE — 6360000002 HC RX W HCPCS: Performed by: GENERAL PRACTICE

## 2025-04-16 PROCEDURE — 85025 COMPLETE CBC W/AUTO DIFF WBC: CPT

## 2025-04-16 PROCEDURE — 81001 URINALYSIS AUTO W/SCOPE: CPT

## 2025-04-16 PROCEDURE — 6360000004 HC RX CONTRAST MEDICATION: Performed by: GENERAL PRACTICE

## 2025-04-16 PROCEDURE — 72131 CT LUMBAR SPINE W/O DYE: CPT

## 2025-04-16 PROCEDURE — 76870 US EXAM SCROTUM: CPT

## 2025-04-16 PROCEDURE — G0378 HOSPITAL OBSERVATION PER HR: HCPCS

## 2025-04-16 RX ORDER — IOPAMIDOL 755 MG/ML
100 INJECTION, SOLUTION INTRAVASCULAR
Status: COMPLETED | OUTPATIENT
Start: 2025-04-16 | End: 2025-04-16

## 2025-04-16 RX ORDER — MORPHINE SULFATE 2 MG/ML
2 INJECTION, SOLUTION INTRAMUSCULAR; INTRAVENOUS
Status: DISCONTINUED | OUTPATIENT
Start: 2025-04-16 | End: 2025-04-17 | Stop reason: HOSPADM

## 2025-04-16 RX ORDER — MORPHINE SULFATE 4 MG/ML
4 INJECTION, SOLUTION INTRAMUSCULAR; INTRAVENOUS
Refills: 0 | Status: COMPLETED | OUTPATIENT
Start: 2025-04-16 | End: 2025-04-16

## 2025-04-16 RX ORDER — ONDANSETRON 2 MG/ML
4 INJECTION INTRAMUSCULAR; INTRAVENOUS ONCE
Status: COMPLETED | OUTPATIENT
Start: 2025-04-16 | End: 2025-04-16

## 2025-04-16 RX ORDER — IPRATROPIUM BROMIDE AND ALBUTEROL SULFATE 2.5; .5 MG/3ML; MG/3ML
1 SOLUTION RESPIRATORY (INHALATION)
Status: ACTIVE | OUTPATIENT
Start: 2025-04-16 | End: 2025-04-17

## 2025-04-16 RX ORDER — OXYCODONE AND ACETAMINOPHEN 10; 325 MG/1; MG/1
1 TABLET ORAL EVERY 4 HOURS PRN
Refills: 0 | Status: DISCONTINUED | OUTPATIENT
Start: 2025-04-16 | End: 2025-04-17 | Stop reason: HOSPADM

## 2025-04-16 RX ADMIN — ONDANSETRON 4 MG: 2 INJECTION, SOLUTION INTRAMUSCULAR; INTRAVENOUS at 17:10

## 2025-04-16 RX ADMIN — MORPHINE SULFATE 4 MG: 4 INJECTION, SOLUTION INTRAMUSCULAR; INTRAVENOUS at 17:10

## 2025-04-16 RX ADMIN — IOPAMIDOL 100 ML: 755 INJECTION, SOLUTION INTRAVENOUS at 20:47

## 2025-04-16 RX ADMIN — HYDROMORPHONE HYDROCHLORIDE 1 MG: 1 INJECTION, SOLUTION INTRAMUSCULAR; INTRAVENOUS; SUBCUTANEOUS at 21:52

## 2025-04-16 RX ADMIN — HYDROMORPHONE HYDROCHLORIDE 1 MG: 1 INJECTION, SOLUTION INTRAMUSCULAR; INTRAVENOUS; SUBCUTANEOUS at 18:16

## 2025-04-16 ASSESSMENT — PAIN SCALES - GENERAL
PAINLEVEL_OUTOF10: 10
PAINLEVEL_OUTOF10: 0
PAINLEVEL_OUTOF10: 10
PAINLEVEL_OUTOF10: 10
PAINLEVEL_OUTOF10: 8

## 2025-04-16 ASSESSMENT — LIFESTYLE VARIABLES
HOW MANY STANDARD DRINKS CONTAINING ALCOHOL DO YOU HAVE ON A TYPICAL DAY: PATIENT DOES NOT DRINK
HOW OFTEN DO YOU HAVE A DRINK CONTAINING ALCOHOL: NEVER

## 2025-04-16 ASSESSMENT — PAIN - FUNCTIONAL ASSESSMENT: PAIN_FUNCTIONAL_ASSESSMENT: 0-10

## 2025-04-16 ASSESSMENT — PAIN DESCRIPTION - LOCATION
LOCATION: BACK

## 2025-04-16 ASSESSMENT — PAIN DESCRIPTION - ORIENTATION: ORIENTATION: LOWER

## 2025-04-16 NOTE — ED PROVIDER NOTES
Emergency Department Provider Note       PCP: Vargas Rodríguez III, MD   Age: 70 y.o.   Sex: male     DISPOSITION Admitted 04/16/2025 10:40:55 PM                ICD-10-CM    1. Intractable back pain  M54.9       2. Abnormal abdominal CT scan  R93.5           Medical Decision Making     Patient appears to be having intractable back pain.  Patient has been here for close to 7 hours mostly because he has refused to lay down for CT for the second CT.  Lumbar spine does show a lucency of unclear etiology.  Unclear if the patient has some type of metastatic process in the back.  He is denying any numbness or weakness to his lower extremities.  No bowel or bladder change.  Nothing to suggest cauda equina or spinal process.  Also doubt spinal epidural abscess.  Nevertheless, the patient will be admitted because of intractable pain.  Patient can hardly walk or lay down because of the pain.  Has had 3 doses of narcotic here.     1 or more acute illnesses that pose a threat to life or bodily function.   Parental controlled substances given in the ED.  Drug therapy given requiring intensive monitoring for toxicity.  Discussion with external consultants.  Shared medical decision making was utilized in creating the patients health plan today.    I independently ordered and reviewed each unique test.  I reviewed external records: provider visit note from PCP.  I reviewed external records: previous lab results from outside ED.  I reviewed external records: previous imaging study including radiologist interpretation.     I interpreted the CT Scan CT scan abdomen pelvis contrast and noncontrast does not show any evidence of acute intra-abdominal pathology.  No renal stone.  No bowel obstruction or perforated viscus.  There is lucency at L3 of unclear etiology.  I have reviewed and agree with radiology report.    The patient was admitted and I have discussed patient management with the admitting provider.          History

## 2025-04-16 NOTE — ED TRIAGE NOTES
Pt daughter states pt has had back and thigh pain for 3-4 weeks. States went to Urgent care last week and got xrays and \"got a shot.\" States can't get an appointment until May. Pt called daughter at work stating he needed to come to ED because he couldn't handle the pain. Daughter states pt seems a \"little off.\" Pt dozing off during triage. Pt unable to hold temp probe under tongue. Pt states pain feels like he \"got kicked in the nuts.\" Has history of stroke.

## 2025-04-17 VITALS
SYSTOLIC BLOOD PRESSURE: 122 MMHG | TEMPERATURE: 97.9 F | HEIGHT: 69 IN | RESPIRATION RATE: 20 BRPM | BODY MASS INDEX: 32.88 KG/M2 | DIASTOLIC BLOOD PRESSURE: 81 MMHG | OXYGEN SATURATION: 97 % | WEIGHT: 222 LBS | HEART RATE: 86 BPM

## 2025-04-17 PROBLEM — Z86.73 HISTORY OF CVA (CEREBROVASCULAR ACCIDENT): Chronic | Status: ACTIVE | Noted: 2025-04-17

## 2025-04-17 PROBLEM — M54.32 SCIATICA OF LEFT SIDE: Status: ACTIVE | Noted: 2021-05-25

## 2025-04-17 PROBLEM — I25.10 CORONARY ATHEROSCLEROSIS: Status: ACTIVE | Noted: 2024-07-16

## 2025-04-17 LAB
GLUCOSE BLD STRIP.AUTO-MCNC: 119 MG/DL (ref 65–100)
GLUCOSE BLD STRIP.AUTO-MCNC: 134 MG/DL (ref 65–100)
SERVICE CMNT-IMP: ABNORMAL
SERVICE CMNT-IMP: ABNORMAL

## 2025-04-17 PROCEDURE — 6370000000 HC RX 637 (ALT 250 FOR IP): Performed by: PHYSICIAN ASSISTANT

## 2025-04-17 PROCEDURE — 96372 THER/PROPH/DIAG INJ SC/IM: CPT

## 2025-04-17 PROCEDURE — G0378 HOSPITAL OBSERVATION PER HR: HCPCS

## 2025-04-17 PROCEDURE — 6370000000 HC RX 637 (ALT 250 FOR IP): Performed by: FAMILY MEDICINE

## 2025-04-17 PROCEDURE — 96375 TX/PRO/DX INJ NEW DRUG ADDON: CPT

## 2025-04-17 PROCEDURE — 6360000002 HC RX W HCPCS: Performed by: PHYSICIAN ASSISTANT

## 2025-04-17 PROCEDURE — 2500000003 HC RX 250 WO HCPCS: Performed by: FAMILY MEDICINE

## 2025-04-17 PROCEDURE — 82962 GLUCOSE BLOOD TEST: CPT

## 2025-04-17 PROCEDURE — 6360000002 HC RX W HCPCS: Performed by: FAMILY MEDICINE

## 2025-04-17 PROCEDURE — 97530 THERAPEUTIC ACTIVITIES: CPT

## 2025-04-17 PROCEDURE — 97161 PT EVAL LOW COMPLEX 20 MIN: CPT

## 2025-04-17 PROCEDURE — 97165 OT EVAL LOW COMPLEX 30 MIN: CPT

## 2025-04-17 RX ORDER — SODIUM CHLORIDE 9 MG/ML
INJECTION, SOLUTION INTRAVENOUS PRN
Status: DISCONTINUED | OUTPATIENT
Start: 2025-04-17 | End: 2025-04-17 | Stop reason: HOSPADM

## 2025-04-17 RX ORDER — POTASSIUM CHLORIDE 7.45 MG/ML
10 INJECTION INTRAVENOUS PRN
Status: DISCONTINUED | OUTPATIENT
Start: 2025-04-17 | End: 2025-04-17 | Stop reason: HOSPADM

## 2025-04-17 RX ORDER — POLYETHYLENE GLYCOL 3350 17 G/17G
17 POWDER, FOR SOLUTION ORAL DAILY PRN
Status: DISCONTINUED | OUTPATIENT
Start: 2025-04-17 | End: 2025-04-17 | Stop reason: HOSPADM

## 2025-04-17 RX ORDER — ALLOPURINOL 300 MG/1
300 TABLET ORAL DAILY
COMMUNITY
Start: 2025-01-31

## 2025-04-17 RX ORDER — HYDROXYZINE HYDROCHLORIDE 25 MG/1
12.5 TABLET, FILM COATED ORAL EVERY 6 HOURS PRN
Status: DISCONTINUED | OUTPATIENT
Start: 2025-04-17 | End: 2025-04-17 | Stop reason: HOSPADM

## 2025-04-17 RX ORDER — METOPROLOL SUCCINATE 25 MG/1
25 TABLET, EXTENDED RELEASE ORAL DAILY
Status: DISCONTINUED | OUTPATIENT
Start: 2025-04-17 | End: 2025-04-17 | Stop reason: HOSPADM

## 2025-04-17 RX ORDER — MAGNESIUM SULFATE IN WATER 40 MG/ML
2000 INJECTION, SOLUTION INTRAVENOUS PRN
Status: DISCONTINUED | OUTPATIENT
Start: 2025-04-17 | End: 2025-04-17 | Stop reason: HOSPADM

## 2025-04-17 RX ORDER — GABAPENTIN 400 MG/1
400 CAPSULE ORAL 3 TIMES DAILY
Status: DISCONTINUED | OUTPATIENT
Start: 2025-04-17 | End: 2025-04-17 | Stop reason: HOSPADM

## 2025-04-17 RX ORDER — METHOCARBAMOL 500 MG/1
500 TABLET, FILM COATED ORAL 3 TIMES DAILY
COMMUNITY
Start: 2025-04-16 | End: 2025-10-13

## 2025-04-17 RX ORDER — POTASSIUM CHLORIDE 1500 MG/1
40 TABLET, EXTENDED RELEASE ORAL PRN
Status: DISCONTINUED | OUTPATIENT
Start: 2025-04-17 | End: 2025-04-17 | Stop reason: HOSPADM

## 2025-04-17 RX ORDER — VALSARTAN 80 MG/1
80 TABLET ORAL DAILY
Status: DISCONTINUED | OUTPATIENT
Start: 2025-04-17 | End: 2025-04-17 | Stop reason: HOSPADM

## 2025-04-17 RX ORDER — VENLAFAXINE HYDROCHLORIDE 150 MG/1
150 CAPSULE, EXTENDED RELEASE ORAL DAILY
COMMUNITY
Start: 2025-02-18

## 2025-04-17 RX ORDER — GABAPENTIN 400 MG/1
400 CAPSULE ORAL 3 TIMES DAILY
COMMUNITY
Start: 2024-05-16

## 2025-04-17 RX ORDER — TRIAMCINOLONE ACETONIDE 1 MG/ML
LOTION TOPICAL
COMMUNITY
Start: 2025-03-26

## 2025-04-17 RX ORDER — LEVOTHYROXINE SODIUM 137 UG/1
137 TABLET ORAL DAILY
COMMUNITY
Start: 2024-05-16

## 2025-04-17 RX ORDER — IBUPROFEN 600 MG/1
1 TABLET ORAL PRN
Status: DISCONTINUED | OUTPATIENT
Start: 2025-04-17 | End: 2025-04-17 | Stop reason: HOSPADM

## 2025-04-17 RX ORDER — ALLOPURINOL 300 MG/1
300 TABLET ORAL DAILY
Status: DISCONTINUED | OUTPATIENT
Start: 2025-04-17 | End: 2025-04-17 | Stop reason: HOSPADM

## 2025-04-17 RX ORDER — COLCHICINE 0.6 MG/1
0.6 TABLET ORAL DAILY
Status: DISCONTINUED | OUTPATIENT
Start: 2025-04-17 | End: 2025-04-17 | Stop reason: HOSPADM

## 2025-04-17 RX ORDER — ACETAMINOPHEN 325 MG/1
650 TABLET ORAL EVERY 6 HOURS PRN
Status: DISCONTINUED | OUTPATIENT
Start: 2025-04-17 | End: 2025-04-17 | Stop reason: HOSPADM

## 2025-04-17 RX ORDER — SODIUM CHLORIDE 0.9 % (FLUSH) 0.9 %
5-40 SYRINGE (ML) INJECTION EVERY 12 HOURS SCHEDULED
Status: DISCONTINUED | OUTPATIENT
Start: 2025-04-17 | End: 2025-04-17 | Stop reason: HOSPADM

## 2025-04-17 RX ORDER — VALSARTAN 80 MG/1
80 TABLET ORAL DAILY
COMMUNITY
Start: 2024-06-05

## 2025-04-17 RX ORDER — ASPIRIN 81 MG/1
81 TABLET ORAL DAILY
COMMUNITY
Start: 2024-07-16

## 2025-04-17 RX ORDER — VENLAFAXINE HYDROCHLORIDE 150 MG/1
150 CAPSULE, EXTENDED RELEASE ORAL DAILY
Status: DISCONTINUED | OUTPATIENT
Start: 2025-04-17 | End: 2025-04-17 | Stop reason: HOSPADM

## 2025-04-17 RX ORDER — INSULIN LISPRO 100 [IU]/ML
INJECTION, SOLUTION INTRAVENOUS; SUBCUTANEOUS
COMMUNITY
Start: 2025-02-18

## 2025-04-17 RX ORDER — INSULIN LISPRO 100 [IU]/ML
0-8 INJECTION, SOLUTION INTRAVENOUS; SUBCUTANEOUS
Status: DISCONTINUED | OUTPATIENT
Start: 2025-04-17 | End: 2025-04-17 | Stop reason: HOSPADM

## 2025-04-17 RX ORDER — ATORVASTATIN CALCIUM 40 MG/1
40 TABLET, FILM COATED ORAL DAILY
Status: DISCONTINUED | OUTPATIENT
Start: 2025-04-17 | End: 2025-04-17 | Stop reason: HOSPADM

## 2025-04-17 RX ORDER — SEMAGLUTIDE 1.34 MG/ML
1 INJECTION, SOLUTION SUBCUTANEOUS
COMMUNITY
Start: 2024-05-30

## 2025-04-17 RX ORDER — METOPROLOL SUCCINATE 25 MG/1
25 TABLET, EXTENDED RELEASE ORAL DAILY
COMMUNITY
Start: 2024-07-16

## 2025-04-17 RX ORDER — DEXTROSE MONOHYDRATE 100 MG/ML
INJECTION, SOLUTION INTRAVENOUS CONTINUOUS PRN
Status: DISCONTINUED | OUTPATIENT
Start: 2025-04-17 | End: 2025-04-17 | Stop reason: HOSPADM

## 2025-04-17 RX ORDER — COLCHICINE 0.6 MG/1
0.6 TABLET ORAL 3 TIMES DAILY
COMMUNITY
Start: 2024-05-16

## 2025-04-17 RX ORDER — HYDROXYZINE HYDROCHLORIDE 25 MG/1
12.5 TABLET, FILM COATED ORAL EVERY 6 HOURS PRN
COMMUNITY
Start: 2024-07-01

## 2025-04-17 RX ORDER — ACETAMINOPHEN 650 MG/1
650 SUPPOSITORY RECTAL EVERY 6 HOURS PRN
Status: DISCONTINUED | OUTPATIENT
Start: 2025-04-17 | End: 2025-04-17 | Stop reason: HOSPADM

## 2025-04-17 RX ORDER — ONDANSETRON 2 MG/ML
4 INJECTION INTRAMUSCULAR; INTRAVENOUS EVERY 6 HOURS PRN
Status: DISCONTINUED | OUTPATIENT
Start: 2025-04-17 | End: 2025-04-17 | Stop reason: HOSPADM

## 2025-04-17 RX ORDER — CLOPIDOGREL BISULFATE 75 MG/1
75 TABLET ORAL DAILY
COMMUNITY
Start: 2024-07-16

## 2025-04-17 RX ORDER — ENOXAPARIN SODIUM 100 MG/ML
40 INJECTION SUBCUTANEOUS DAILY
Status: DISCONTINUED | OUTPATIENT
Start: 2025-04-17 | End: 2025-04-17 | Stop reason: HOSPADM

## 2025-04-17 RX ORDER — SODIUM CHLORIDE 0.9 % (FLUSH) 0.9 %
5-40 SYRINGE (ML) INJECTION PRN
Status: DISCONTINUED | OUTPATIENT
Start: 2025-04-17 | End: 2025-04-17 | Stop reason: HOSPADM

## 2025-04-17 RX ORDER — ATORVASTATIN CALCIUM 40 MG/1
40 TABLET, FILM COATED ORAL DAILY
COMMUNITY
Start: 2025-02-18

## 2025-04-17 RX ORDER — KETOROLAC TROMETHAMINE 15 MG/ML
15 INJECTION, SOLUTION INTRAMUSCULAR; INTRAVENOUS ONCE
Status: COMPLETED | OUTPATIENT
Start: 2025-04-17 | End: 2025-04-17

## 2025-04-17 RX ORDER — METHOCARBAMOL 500 MG/1
500 TABLET, FILM COATED ORAL 3 TIMES DAILY
Status: DISCONTINUED | OUTPATIENT
Start: 2025-04-17 | End: 2025-04-17 | Stop reason: HOSPADM

## 2025-04-17 RX ORDER — ASPIRIN 81 MG/1
81 TABLET ORAL DAILY
Status: DISCONTINUED | OUTPATIENT
Start: 2025-04-17 | End: 2025-04-17 | Stop reason: HOSPADM

## 2025-04-17 RX ORDER — CLOPIDOGREL BISULFATE 75 MG/1
75 TABLET ORAL DAILY
Status: DISCONTINUED | OUTPATIENT
Start: 2025-04-17 | End: 2025-04-17 | Stop reason: HOSPADM

## 2025-04-17 RX ORDER — ONDANSETRON 4 MG/1
4 TABLET, ORALLY DISINTEGRATING ORAL EVERY 8 HOURS PRN
Status: DISCONTINUED | OUTPATIENT
Start: 2025-04-17 | End: 2025-04-17 | Stop reason: HOSPADM

## 2025-04-17 RX ORDER — INSULIN GLARGINE 100 [IU]/ML
18 INJECTION, SOLUTION SUBCUTANEOUS DAILY
COMMUNITY
Start: 2024-05-15

## 2025-04-17 RX ADMIN — METHOCARBAMOL 500 MG: 500 TABLET ORAL at 09:13

## 2025-04-17 RX ADMIN — ALLOPURINOL 300 MG: 300 TABLET ORAL at 09:17

## 2025-04-17 RX ADMIN — VENLAFAXINE HYDROCHLORIDE 150 MG: 150 CAPSULE, EXTENDED RELEASE ORAL at 09:16

## 2025-04-17 RX ADMIN — SODIUM CHLORIDE, PRESERVATIVE FREE 10 ML: 5 INJECTION INTRAVENOUS at 09:13

## 2025-04-17 RX ADMIN — ASPIRIN 81 MG: 81 TABLET, COATED ORAL at 09:13

## 2025-04-17 RX ADMIN — ATORVASTATIN CALCIUM 40 MG: 40 TABLET, FILM COATED ORAL at 09:13

## 2025-04-17 RX ADMIN — KETOROLAC TROMETHAMINE 15 MG: 15 INJECTION, SOLUTION INTRAMUSCULAR; INTRAVENOUS at 13:02

## 2025-04-17 RX ADMIN — GABAPENTIN 400 MG: 400 CAPSULE ORAL at 14:14

## 2025-04-17 RX ADMIN — OXYCODONE AND ACETAMINOPHEN 1 TABLET: 10; 325 TABLET ORAL at 03:44

## 2025-04-17 RX ADMIN — METOPROLOL SUCCINATE 25 MG: 25 TABLET, EXTENDED RELEASE ORAL at 09:14

## 2025-04-17 RX ADMIN — VALSARTAN 80 MG: 80 TABLET ORAL at 09:14

## 2025-04-17 RX ADMIN — CLOPIDOGREL 75 MG: 75 TABLET, FILM COATED ORAL at 09:13

## 2025-04-17 RX ADMIN — GABAPENTIN 400 MG: 400 CAPSULE ORAL at 09:14

## 2025-04-17 RX ADMIN — ENOXAPARIN SODIUM 40 MG: 100 INJECTION SUBCUTANEOUS at 09:18

## 2025-04-17 RX ADMIN — COLCHICINE 0.6 MG: 0.6 TABLET, FILM COATED ORAL at 09:13

## 2025-04-17 RX ADMIN — LEVOTHYROXINE SODIUM 137 MCG: 0.11 TABLET ORAL at 09:14

## 2025-04-17 RX ADMIN — METHOCARBAMOL 500 MG: 500 TABLET ORAL at 14:14

## 2025-04-17 ASSESSMENT — PAIN SCALES - GENERAL
PAINLEVEL_OUTOF10: 9
PAINLEVEL_OUTOF10: 9

## 2025-04-17 ASSESSMENT — PAIN DESCRIPTION - ORIENTATION
ORIENTATION: LEFT

## 2025-04-17 ASSESSMENT — PAIN DESCRIPTION - DESCRIPTORS
DESCRIPTORS: ACHING

## 2025-04-17 ASSESSMENT — PAIN DESCRIPTION - LOCATION
LOCATION: KNEE
LOCATION: HIP
LOCATION: HIP;KNEE

## 2025-04-17 NOTE — PROGRESS NOTES
ACUTE PHYSICAL THERAPY GOALS:   (Developed with and agreed upon by patient and/or caregiver.)      LTG:  (1.)Mr. Segal will move from supine to sit and sit to supine  in bed with INDEPENDENT within 7 treatment day(s).    (2.)Mr. Seagl will transfer from bed to chair and chair to bed with SUPERVISION using the least restrictive device within 7 treatment day(s).    (3.)Mr. Segal will ambulate with SUPERVISION for 50 feet with the least restrictive device within 7 treatment day(s).  (4)Mr. Segal will perform HEP with cues and assistance to improve back pain in 7 days.  ________________________________________________________________________________________________      PHYSICAL THERAPY Initial Assessment and AM  (Link to Caseload Tracking: PT Visit Days : 1  Acknowledge Orders  Time In/Out  PT Charge Capture  Rehab Caseload Tracker    Desmond Segal is a 70 y.o. male   PRIMARY DIAGNOSIS: Intractable low back pain  Abnormal abdominal CT scan [R93.5]  Intractable back pain [M54.9]  Intractable low back pain [M54.59]       Reason for Referral: Generalized Muscle Weakness (M62.81)  Other abnormalities of gait and mobility (R26.89)  Observation: Payor: [employer] / Plan: [employer] CHOICE[employer] MEDICARE / Product Type: *No Product type* /     ASSESSMENT:     REHAB RECOMMENDATIONS:   Recommendation to date pending progress:  Setting:  Continued physical therapy recommended at discharge.    Equipment:     May need RW     ASSESSMENT:  Mr. Segal presents with significant functional mobility limitations due to left hip/groin pain admitted with above diagnosis.  He lives alone and says he has been dealing with his pain for 3 weeks.  He gets around his apartment by furniture walking.  He has chronic weakness and numbness left side from previous CVA. He mentions a daughter who brought him to hospital.   This am, he is in the chair talking a lot and moving non stop in the chair due to pain. He can't get comfortable.

## 2025-04-17 NOTE — PROGRESS NOTES
ACUTE OCCUPATIONAL THERAPY GOALS:   (Developed with and agreed upon by patient and/or caregiver.)  1. Patient will perform grooming with supervision.  2. Patient will perform upper body dressing with supervision.  3. Patient will perform lower body dressing with min assist.   4. Patient will perform bathing with min assist.   5. Patient will perform toileting and toilet transfer with contact guard assist.   6. Patient will perform ADL functional mobility and tranfers in room with contact guard assist.   7. Patient/family to demonstrate knowledge of home safety and DME recommendations.    Goals to be achieved in 7 days.     OCCUPATIONAL THERAPY Initial Assessment and AM       OT Visit Days: 1  Acknowledge Orders  Time  OT Charge Capture  Rehab Caseload Tracker      Desmond Segal is a 70 y.o. male   PRIMARY DIAGNOSIS: Intractable low back pain  Abnormal abdominal CT scan [R93.5]  Intractable back pain [M54.9]  Intractable low back pain [M54.59]       Reason for Referral: Generalized Muscle Weakness (M62.81)  Other lack of cordination (R27.8)  Difficulty in walking, Not elsewhere classified (R26.2)  Observation: Payor: [employer]ARE / Plan: [employer] CHOICE-PP[employer] MEDICARE / Product Type: *No Product type* /     ASSESSMENT:     REHAB RECOMMENDATIONS:   Recommendation to date pending progress:  Setting:  Continued occupational therapy recommended at discharge    Equipment:    To Be Determined     ASSESSMENT:  Mr. Segal admitted with above diagnosis. Pt with history of stroke. Pt presents to the ED with severe left  flank and abdominal pain for 3-4 weeks. Pt's pain increases with activity. Pt reports having left sided intermittent low back, flank and groin pain that extends to his left knee. Pt also reports numbness to his left arm and left hip to knee since his stroke. Pt very hard of hearing. Pt with decreased ROM BUEs LUE > RUE. Pt is performing below his baseline and would benefit from further OT to address

## 2025-04-17 NOTE — H&P
[facility] Hospitalist Initial History and Physical Note    Patient: Desmond Segal Date: 4/17/2025  male, 70 y.o.  Admit Date: 4/16/2025  Attending: Jamar Nicholas MD     ASSESSMENT AND PLAN:     Principal Problem:    Intractable low back pain  Plan: Uncertain etiology. L3 vertebral lytic lesion on CT. MRI lumbar spine in AM. IV/PO pain control. PT/OT consults.   Active Problems:    Major depression  Plan: Stable. Continue home meds.    Acquired hypothyroidism  Plan: Stable. Continue home meds.    Diabetic amyotrophy associated with type 2 diabetes mellitus  Plan: Stable. Continue home meds.    Hyperlipidemia  Plan: Stable. Continue home meds.    Hypertension  Plan: Stable. Continue home meds.    DM type 2 (diabetes mellitus, type 2) (HCC)  Plan: Stable. Continue home meds.         DVT Prophylaxis: Lovenox      Code Status: FULL CODE      Disposition: Observe on med/surg for evaluation and treatment as per above.      Anticipated discharge: < 2 midnights     CHIEF COMPLAINT:  low back pain    HISTORY OF PRESENT ILLNESS:      Patient Active Problem List   Diagnosis    Major depression    Left-sided weakness    Stroke-like symptoms    ADD (attention deficit disorder)    Acquired hypothyroidism    Diabetic amyotrophy associated with type 2 diabetes mellitus    Hyperlipidemia    Thalamic infarct, acute (HCC)    Hypertension    TIA (transient ischemic attack)    DM type 2 (diabetes mellitus, type 2) (HCC)    Intractable low back pain       Desmond Segal is a 70 y.o. male, with a history of  has a past medical history of Chronic kidney disease, Hypertension, Major depression, Stroke (HCC), and Thyroid disease.,  has no past surgical history on file. who presents to the ER with report of severe left flank pain that has gotten worse over the past 4 days. Pain is sharp and throbbing and comes intermittently. Has not had any specific triggering movements. Denies any fevers, chills, nausea, vomiting, weakness of  PM    ACCESSION NUMBER: [id number]    COMPARISON: CT abdomen/pelvis from 4/16/2025. CT urogram from 3/15/2015.    INDICATION: L3 abnormality    TECHNIQUE: Contiguous 2D CT images of the lumbar spine were obtained without  intravenous contrast. Coronal and sagittal reformations were made.    Radiation dose reduction techniques were used for this study. Our CT scanners  use one or all of the following: Automated exposure control, adjustment of the  mA and/or kV according to patient size, or iterative reconstruction.    FINDINGS:    Multilevel degenerative changes are present with disc space narrowing, endplate  spurring, and vacuum disc phenomenon. There is minimal retrolisthesis of L1-L2,  L2-L3 and L5-S1. Multilevel Schmorl's nodes are identified. There are advanced  degenerative changes at L2-L3 with endplate subcortical cystic changes. However,  there is a large lytic area involving the right mid to posterior aspect of the  superior endplate of L3 which extends to the mid to lower body of the L3  vertebral body measuring approximately 2.6 x 1.5 x 2.2 cm in the AP, CC, and  transverse dimensions respectively. Disc space narrowing at L2-L3 is greater on  the right. Findings are new as compared to the prior CT exam from 2015. There is  no evidence of extension into the posterior elements. No acute fracture or  subluxation is present. There is mild levoscoliosis of the lumbar spine. Raymond of  the levoscoliosis is at L2-L3. Multilevel facet arthropathy is present.  Impression: 1. No acute osseous abnormality.  2. Advanced multilevel degenerative changes greatest at L2-L3 with prominent  lytic area involving the right L3 vertebral body which may be on a degenerative  basis. However, recommend further evaluation with MRI of the lumbar spine with  and without IV contrast on an outpatient basis and/or nuclear medicine bone  scan.    Electronically signed by Brandan Landrum       No results found for this or any previous visit

## 2025-04-17 NOTE — CARE COORDINATION
CASE MANAGEMENT ASSESSMENT NOTE    Patient is a 70 year old male with intractable back pain.      Patient assessment completed at bedside.  Patient presents to assessment alert and oriented, and answers questions appropriately.  Is very hard of hearing.  He lives at home alone.  He has a daughter that lives nearby who helps out.  At baseline, he is independent with transfers, and does not use assistive devices.  Lives in a first floor apartment.  He is not typically an active , but family helps with transportation.  He has [employer] medicare insurance.  Is established with PCP, Dr. Vargas Rodríguez.     Patient has PT/OT evals ordered.  Patient understands need for these evals to help determine discharge disposition.    At this time, care of this patient is ongoing and discharge disposition pending.  PT/OT evals have been ordered and awaiting recommendations.  Case management will continue to follow.  Please notify if there are any changes.     Attending Physician: Manolo Vallecillo*  Admit Problem: Abnormal abdominal CT scan [R93.5]  Intractable back pain [M54.9]  Intractable low back pain [M54.59]  Date/Time of Admission: 4/16/2025  3:29 PM  Problem List:  Patient Active Problem List   Diagnosis    Major depression    Left-sided weakness    Stroke-like symptoms    ADD (attention deficit disorder)    Acquired hypothyroidism    Diabetic amyotrophy associated with type 2 diabetes mellitus    Hyperlipidemia    Thalamic infarct, acute (HCC)    Hypertension    TIA (transient ischemic attack)    DM type 2 (diabetes mellitus, type 2) ([facility])    Intractable low back pain    Sciatica of left side    Coronary atherosclerosis    History of CVA (cerebrovascular accident)          04/17/25 1054   Service Assessment   Patient Orientation Alert and Oriented   Cognition Alert   History Provided By Patient   Primary Caregiver Self   Accompanied By/Relationship N/A   Support Systems Family Members   Patient's Healthcare

## 2025-04-17 NOTE — ED NOTES
TRANSFER - OUT REPORT:    Verbal report given to Cally on Desmond Segal  being transferred to [facility]  for routine progression of patient care       Report consisted of patient's Situation, Background, Assessment and   Recommendations(SBAR).     Information from the following report(s) ED Encounter Summary, ED SBAR, MAR, and Recent Results was reviewed with the receiving nurse.    Lines:   Peripheral IV 04/16/25 Left Antecubital (Active)   Site Assessment Clean, dry & intact 04/16/25 1613   Line Status Blood return noted;Normal saline locked;Capped;Flushed;Specimen collected 04/16/25 1613   Phlebitis Assessment No symptoms 04/16/25 1613   Infiltration Assessment 0 04/16/25 1613   Dressing Status New dressing applied;Clean, dry & intact 04/16/25 1613   Dressing Type Transparent 04/16/25 1613   Dressing Intervention New 04/16/25 1613        Opportunity for questions and clarification was provided.      Patient transported with:  Tech

## 2025-04-17 NOTE — ACP (ADVANCE CARE PLANNING)
Advance Care Planning   General Advance Care Planning (ACP) Conversation    Date of Conversation: 4/16/2025  Conducted with: Patient with Decision Making Capacity  Other persons present: None    Healthcare Decision Maker:    Primary Decision Maker: London Segal - Child - [phone]    Secondary Decision Maker: Fransico Segal - Brother/Sister - [phone]    Today we documented Decision Maker(s) consistent with Legal Next of Kin hierarchy.  Content/Action Overview:  DECLINED ACP Conversation - will revisit periodically  Length of Voluntary ACP Conversation in minutes:  <16 minutes (Non-Billable)    Quintin Quezada RN

## 2025-04-17 NOTE — DISCHARGE SUMMARY
Hospitalist Discharge Summary   Admit Date:  2025  3:29 PM   DC Note date: 2025  Name:  Desmond Segal   Age:  70 y.o.  Sex:  male  :  1955   MRN:  [id number]   Room:  [facility]  PCP:  Vargas Rodríguez III, MD    Presenting Complaint: Back Pain and Altered Mental Status (Pt daughter states pt has had back pain for 3-4 weeks. States went to Urgent care last week and \"got a shot.\" States can't get an appointment until May. Pt called daughter at work stating he needed to come to ED because he couldn't handle the pain. Daughter states pt seems a \"little off.\" Pt dozing off during triage. Pt unable to hold temp probe under tongue.)     Initial Admission Diagnosis: Abnormal abdominal CT scan [R93.5]  Intractable back pain [M54.9]  Intractable low back pain [M54.59]     Problem List for this Hospitalization (present on admission):    Principal Problem:    Intractable low back pain  Active Problems:    Major depression    Acquired hypothyroidism    Diabetic amyotrophy associated with type 2 diabetes mellitus    Hyperlipidemia    Hypertension    DM type 2 (diabetes mellitus, type 2) ([facility])    History of CVA (cerebrovascular accident)  Resolved Problems:    * No resolved hospital problems. *      Hospital Course:  Desmond Segal is a 70 y.o. male with medical history of CVA, CKD, HTN, depression, hypothyroidism, lumbar back pain who presented with left back, flank, and groin pain that has worsened over the last 4 days. Pain is sharp and throbbing and comes intermittently. Has not had any specific triggering movements. Gabapentin helps somewhat at home but has been been controlling it as well the last few days. Feels like he is getting kicked in the scrotum at times with shocklike pain. Pain worsens throughout the day with the more activity that he does. Denies any fevers, chills, nausea, vomiting, weakness of the legs, dysuria. At home he has been \"self-doctoring\" and adjusts his insulin regimen  04/16/2025 04:08 PM    BILIRUBINUR Negative 04/16/2025 04:08 PM    BLOODU Negative 04/16/2025 04:08 PM    UROBILINOGEN 0.2 04/16/2025 04:08 PM    NITRU Negative 04/16/2025 04:08 PM    LEUKOCYTESUR Negative 04/16/2025 04:08 PM    WBCUA 0-3 04/16/2025 04:08 PM    RBCUA 0-3 04/16/2025 04:08 PM    BACTERIA 0 04/16/2025 04:08 PM    LABCAST HYALINE 04/16/2025 04:08 PM    MUCUS 0 07/02/2023 06:57 PM        Microbiology:  Results       ** No results found for the last 336 hours. **            All Labs from Last 24 Hrs:  Recent Results (from the past 24 hours)   CBC with Auto Differential    Collection Time: 04/16/25  4:08 PM   Result Value Ref Range    WBC 7.6 4.3 - 11.1 K/uL    RBC 4.52 4.23 - 5.6 M/uL    Hemoglobin 14.2 13.6 - 17.2 g/dL    Hematocrit 41.9 41.1 - 50.3 %    MCV 92.7 82.0 - 102.0 FL    MCH 31.4 26.1 - 32.9 PG    MCHC 33.9 31.4 - 35.0 g/dL    RDW 13.9 11.9 - 14.6 %    Platelets 189 150 - 450 K/uL    MPV 11.3 9.4 - 12.3 FL    nRBC 0.00 0.0 - 0.2 K/uL    Differential Type AUTOMATED      Neutrophils % 61.2 43.0 - 78.0 %    Lymphocytes % 23.3 13.0 - 44.0 %    Monocytes % 7.6 4.0 - 12.0 %    Eosinophils % 6.7 0.5 - 7.8 %    Basophils % 0.8 0.0 - 2.0 %    Immature Granulocytes % 0.4 0.0 - 5.0 %    Neutrophils Absolute 4.68 1.70 - 8.20 K/UL    Lymphocytes Absolute 1.78 0.50 - 4.60 K/UL    Monocytes Absolute 0.58 0.10 - 1.30 K/UL    Eosinophils Absolute 0.51 0.00 - 0.80 K/UL    Basophils Absolute 0.06 0.00 - 0.20 K/UL    Immature Granulocytes Absolute 0.03 0.0 - 0.5 K/UL   CMP    Collection Time: 04/16/25  4:08 PM   Result Value Ref Range    Sodium 138 136 - 145 mmol/L    Potassium 5.1 3.5 - 5.1 mmol/L    Chloride 103 98 - 107 mmol/L    CO2 24 20 - 29 mmol/L    Anion Gap 11 7 - 16 mmol/L    Glucose 183 (H) 70 - 99 mg/dL    BUN 24 (H) 8 - 23 MG/DL    Creatinine 1.37 (H) 0.80 - 1.30 MG/DL    Est, Glom Filt Rate 55 (L) >60 ml/min/1.73m2    Calcium 9.6 8.8 - 10.2 MG/DL    Total Bilirubin 0.4 0.0 - 1.2 MG/DL    ALT 68 (H)

## 2025-04-17 NOTE — PROGRESS NOTES
Charge nurse notified Director that patient had concerns regarding condition of room. Director went to room to complete leader rounds and address concerns prior to discharge. Patient in recliner chair with brother present in room. Patient stated room was visibly dirty with trash on floor, and began yelling at nurses in room. Patient brother also began yelling and cursing at nurses. Brother asked to leave room and refrain from speaking to staff in this manner. Security called to inform of visitor behavior. Brother left room and facility. Patient discharge completed per primary RN. Patient assisted to vehicle driven by brother with RN, tech and security present. Patient stated to give MD a message that if he ws 30 years younger he would have been in for a fun night. Explained to patient that is inappropriate. Patient did express apology for behavior. Upon inspection of room, no trash was noted.

## 2025-04-17 NOTE — PROGRESS NOTES
Hospitalist Progress Note   Admit Date:  2025  3:29 PM   Name:  Desmond Segal   Age:  70 y.o.  Sex:  male  :  1955   MRN:  [id number]   Room:  [facility]/    Presenting/Chief Complaint: Back Pain and Altered Mental Status (Pt daughter states pt has had back pain for 3-4 weeks. States went to Urgent care last week and \"got a shot.\" States can't get an appointment until May. Pt called daughter at work stating he needed to come to ED because he couldn't handle the pain. Daughter states pt seems a \"little off.\" Pt dozing off during triage. Pt unable to hold temp probe under tongue.)     Reason(s) for Admission: Abnormal abdominal CT scan [R93.5]  Intractable back pain [M54.9]  Intractable low back pain [M54.59]     Hospital Course:   Desmond Segal is a 70 y.o. male with medical history of CVA, CKD, HTN, depression, hypothyroidism, lumbar back pain who presented with left back, flank, and groin pain that has worsened over the last 4 days. Pain is sharp and throbbing and comes intermittently. Has not had any specific triggering movements. Gabapentin helps somewhat at home but has been been controlling it as well the last few days. Feels like he is getting kicked in the scrotum at times with shocklike pain. Pain worsens throughout the day with the more activity that he does. Denies any fevers, chills, nausea, vomiting, weakness of the legs, dysuria. At home he has been \"self-doctoring\" and adjusts his insulin regimen as he sees fit based upon blood sugars. He has been compliant with most of his medications except for Plavix which he has not taken in several days as he was afraid if he needed surgery for his back pain it would delay the surgery and increased bleeding risk.  He does not have any surgery scheduled.     In the ED VSS. CBC unremarkable. Cr 1.37 (Cr ~1.0 on 2024). UA with trace ketones otherwise uninfected.  Scrotal ultrasound unremarkable.  CT a/p imaging in the ED revealed  acetaminophen (TYLENOL) suppository 650 mg  650 mg Rectal Q6H PRN    enoxaparin (LOVENOX) injection 40 mg  40 mg SubCUTAneous Daily    allopurinol (ZYLOPRIM) tablet 300 mg  300 mg Oral Daily    aspirin EC tablet 81 mg  81 mg Oral Daily    atorvastatin (LIPITOR) tablet 40 mg  40 mg Oral Daily    clopidogrel (PLAVIX) tablet 75 mg  75 mg Oral Daily    colchicine (COLCRYS) tablet 0.6 mg  0.6 mg Oral Daily    gabapentin (NEURONTIN) capsule 400 mg  400 mg Oral TID    hydrOXYzine HCl (ATARAX) tablet 12.5 mg  12.5 mg Oral Q6H PRN    levothyroxine (SYNTHROID) tablet 137 mcg  137 mcg Oral Daily    methocarbamol (ROBAXIN) tablet 500 mg  500 mg Oral TID    metoprolol succinate (TOPROL XL) extended release tablet 25 mg  25 mg Oral Daily    valsartan (DIOVAN) tablet 80 mg  80 mg Oral Daily    venlafaxine (EFFEXOR XR) extended release capsule 150 mg  150 mg Oral Daily    morphine (PF) injection 2 mg  2 mg IntraVENous Q3H PRN    oxyCODONE-acetaminophen (PERCOCET)  MG per tablet 1 tablet  1 tablet Oral Q4H PRN    ipratropium 0.5 mg-albuterol 2.5 mg (DUONEB) nebulizer solution 1 Dose  1 Dose Inhalation NOW       Signed:  DENG Modi    Part of this note may have been written by using a voice dictation software.  The note has been proof read but may still contain some grammatical/other typographical errors.

## 2025-04-17 NOTE — PROGRESS NOTES
Discharge instructions reviewed with Pt. Opportunity for questions and clarification given. IV removed and cath tip intact. Scripts sent to pharmacy. Education given to pt and pt was able to teach back. No needs at this time and patient's brother is transport him home.

## 2025-04-17 NOTE — ACP (ADVANCE CARE PLANNING)
Advance Care Planning   General Advance Care Planning (ACP) Conversation    Date of Conversation: 4/16/2025  Conducted with: Patient with Decision Making Capacity  Other persons present: None    Healthcare Decision Maker:    Primary Decision Maker: London Segal - Child - [phone]    Secondary Decision Maker: Fransico Segal - Brother/Sister - [phone]    Today we documented Decision Maker(s) consistent with Legal Next of Kin hierarchy.  Content/Action Overview:  DECLINED ACP Conversation - will revisit periodically      Length of Voluntary ACP Conversation in minutes:  <16 minutes (Non-Billable)    Quintin Quezada RN

## 2025-04-17 NOTE — PLAN OF CARE
Problem: Safety - Adult  Goal: Free from fall injury  4/17/2025 1206 by Parviz Lopez RN  Outcome: Progressing  4/17/2025 0622 by Fela Monteiro RN  Outcome: Progressing     Problem: Pain  Goal: Verbalizes/displays adequate comfort level or baseline comfort level  4/17/2025 1206 by Parviz Lopez, RN  Outcome: Progressing  4/17/2025 0622 by Fela Monteiro RN  Outcome: Progressing